# Patient Record
Sex: FEMALE | Race: WHITE | NOT HISPANIC OR LATINO | Employment: OTHER | URBAN - METROPOLITAN AREA
[De-identification: names, ages, dates, MRNs, and addresses within clinical notes are randomized per-mention and may not be internally consistent; named-entity substitution may affect disease eponyms.]

---

## 2017-08-30 DIAGNOSIS — M25.571 RIGHT ANKLE PAIN, UNSPECIFIED CHRONICITY: Primary | ICD-10-CM

## 2017-08-31 ENCOUNTER — OFFICE VISIT (OUTPATIENT)
Dept: ORTHOPEDICS | Facility: CLINIC | Age: 62
End: 2017-08-31
Payer: MEDICARE

## 2017-08-31 ENCOUNTER — HOSPITAL ENCOUNTER (OUTPATIENT)
Dept: RADIOLOGY | Facility: HOSPITAL | Age: 62
Discharge: HOME | End: 2017-08-31
Attending: ORTHOPAEDIC SURGERY
Payer: MEDICARE

## 2017-08-31 VITALS
WEIGHT: 213 LBS | HEART RATE: 60 BPM | SYSTOLIC BLOOD PRESSURE: 108 MMHG | DIASTOLIC BLOOD PRESSURE: 57 MMHG | HEIGHT: 67 IN | BODY MASS INDEX: 33.43 KG/M2

## 2017-08-31 DIAGNOSIS — M19.079 ARTHRITIS OF MIDFOOT: ICD-10-CM

## 2017-08-31 DIAGNOSIS — M19.071 OSTEOARTHRITIS OF MIDFOOT, RIGHT: Primary | ICD-10-CM

## 2017-08-31 DIAGNOSIS — M25.571 RIGHT ANKLE PAIN, UNSPECIFIED CHRONICITY: ICD-10-CM

## 2017-08-31 PROCEDURE — 73610 X-RAY EXAM OF ANKLE: CPT | Mod: 26,RT,, | Performed by: RADIOLOGY

## 2017-08-31 PROCEDURE — 73610 X-RAY EXAM OF ANKLE: CPT | Mod: TC,PN,RT

## 2017-08-31 PROCEDURE — 3008F BODY MASS INDEX DOCD: CPT | Mod: S$GLB,,, | Performed by: ORTHOPAEDIC SURGERY

## 2017-08-31 PROCEDURE — 3074F SYST BP LT 130 MM HG: CPT | Mod: S$GLB,,, | Performed by: ORTHOPAEDIC SURGERY

## 2017-08-31 PROCEDURE — 3078F DIAST BP <80 MM HG: CPT | Mod: S$GLB,,, | Performed by: ORTHOPAEDIC SURGERY

## 2017-08-31 PROCEDURE — 99204 OFFICE O/P NEW MOD 45 MIN: CPT | Mod: S$GLB,,, | Performed by: ORTHOPAEDIC SURGERY

## 2017-08-31 PROCEDURE — 99999 PR PBB SHADOW E&M-EST. PATIENT-LVL III: CPT | Mod: PBBFAC,,, | Performed by: ORTHOPAEDIC SURGERY

## 2017-08-31 NOTE — LETTER
September 5, 2017      Vel Park MD  [address] [facility] [address]  [facility] [address]           [address] [address]  Phone: [phone]          Patient: Megha Robb   MR Number: [id number]   YOB: 1955   Date of Visit: 8/31/2017       Dear Dr. Vel Park:    Thank you for referring Megha Robb to me for evaluation. Attached you will find relevant portions of my assessment and plan of care.    If you have questions, please do not hesitate to call me. I look forward to following Megha Robb along with you.    Sincerely,    Keisha Ball  CC:  No Recipients    If you would like to receive this communication electronically, please contact [email or web address] or [phone] to request more information on [employer] Link access.    For providers and/or their staff who would like to refer a patient to [facility], please contact us through our one-stop-shop provider referral line, [facility], at [phone].    If you feel you have received this communication in error or would no longer like to receive these types of communications, please e-mail [email or web address]

## 2017-09-06 PROBLEM — M19.079 ARTHRITIS OF MIDFOOT: Status: ACTIVE | Noted: 2017-09-06

## 2017-09-06 RX ORDER — CLINDAMYCIN PHOSPHATE 900 MG/50ML
900 INJECTION, SOLUTION INTRAVENOUS
Status: CANCELLED | OUTPATIENT
Start: 2017-09-06

## 2017-09-06 RX ORDER — MUPIROCIN 20 MG/G
1 OINTMENT TOPICAL
Status: CANCELLED | OUTPATIENT
Start: 2017-09-06

## 2017-09-06 NOTE — PROGRESS NOTES
"HPI: Megha Robb is a 62 y.o. female who was referred to me by Dr. Park and was seen in consultation today for right ankle and foot pain.  No history of injury or trauma. She rates her pain as 9/10 today and The pain is worse with walking and standing. She has h/o of chronic pain and is disabled for back and neck pain. She has a pain management doctor.     PAST MEDICAL/SURGICAL/FAMILY/SOCIAL/ HISTORY: REVIEWED    ALLERGIES/MEDICATIONS: REVIEWED       Review of Systems:     Constitution: Negative.   HEENT: Negative.   Eyes: Negative.   Cardiovascular: Negative.   Respiratory: Negative.   Endocrine: Negative.   Hematologic/Lymphatic: Negative.   Skin: Negative.   Musculoskeletal: Positive for right foot and ankle pain   Gastrointestinal: Negative.   Genitourinary: Negative.   Neurological: Negative.   Psychiatric/Behavioral: Negative.   Allergic/Immunologic: Negative.       PHYSICAL EXAM:  Vitals:    08/31/17 0826   BP: (!) 108/57   Pulse: 60     Ht Readings from Last 1 Encounters:   08/31/17 5' 7" (1.702 m)     Wt Readings from Last 1 Encounters:   08/31/17 96.6 kg (213 lb)         GENERAL: Well developed, well nourished, no acute distress.  SKIN: Skin is intact. No atrophy, abrasions or lesions are noted.   Neurological: Normal mental status. Appropriate and conversant. Alert and oriented x 3.  GAIT: Walks with non-antalgic gait.    Right lower extremity compared with LLE:  1+ dorsalis pedis pulse.  Capillary refill < 3 seconds.  Normal range of motion tibiotalar and subtalar joints. Normal alignment of the forefoot and the hindfoot.  5/5 strength EHL, FHL, tibialis anterior, gastrocsoleus, tibialis posterior and peroneals. Sensation to light touch intact sural, saphenous, superficial peroneal and deep peroneal nerves. Mild swelling midfoot with  tenderness to palpation and dorsal bossing. No ecchymosis or deformity. No lymphadenopathy, no masses or tumors palpated.        XRAYS:   3 views of right ankle " obtained and reviewed today reveal midfoot osteoarthritis with dorsal spurring.       ASSESSMENT:        Encounter Diagnoses   Name Primary?    Osteoarthritis of midfoot, right Yes    Arthritis of midfoot       PLAN:  I spent 20 minutes in consulation with the patient today. More than half the time was spent counseling the patient on her condition and the options for operative versus non-operative care.  She declined injection today. Plan on removal of bone spurs and if she has persistent pain we can plan on midfoot fusion in future.

## 2017-09-08 ENCOUNTER — ANESTHESIA EVENT (OUTPATIENT)
Dept: SURGERY | Facility: HOSPITAL | Age: 62
End: 2017-09-08
Payer: MEDICARE

## 2017-09-11 ENCOUNTER — SURGERY (OUTPATIENT)
Age: 62
End: 2017-09-11

## 2017-09-11 ENCOUNTER — ANESTHESIA (OUTPATIENT)
Dept: SURGERY | Facility: HOSPITAL | Age: 62
End: 2017-09-11
Payer: MEDICARE

## 2017-09-11 ENCOUNTER — HOSPITAL ENCOUNTER (OUTPATIENT)
Facility: HOSPITAL | Age: 62
Discharge: HOME | End: 2017-09-11
Attending: ORTHOPAEDIC SURGERY | Admitting: ORTHOPAEDIC SURGERY
Payer: MEDICARE

## 2017-09-11 VITALS
BODY MASS INDEX: 35.44 KG/M2 | WEIGHT: 200 LBS | SYSTOLIC BLOOD PRESSURE: 120 MMHG | HEIGHT: 63 IN | RESPIRATION RATE: 18 BRPM | HEART RATE: 60 BPM | DIASTOLIC BLOOD PRESSURE: 62 MMHG | TEMPERATURE: 98 F | OXYGEN SATURATION: 98 %

## 2017-09-11 DIAGNOSIS — M19.071 OSTEOARTHRITIS OF MIDFOOT, RIGHT: Primary | ICD-10-CM

## 2017-09-11 PROBLEM — M19.079 OSTEOARTHRITIS OF MIDFOOT: Status: ACTIVE | Noted: 2017-09-11

## 2017-09-11 PROCEDURE — 28104 REMOVAL OF FOOT LESION: CPT | Mod: RT,,, | Performed by: ORTHOPAEDIC SURGERY

## 2017-09-11 PROCEDURE — S0020 INJECTION, BUPIVICAINE HYDRO: HCPCS | Mod: PO | Performed by: ORTHOPAEDIC SURGERY

## 2017-09-11 PROCEDURE — 25000003 PHARM REV CODE 250: Mod: PO | Performed by: ANESTHESIOLOGY

## 2017-09-11 PROCEDURE — 36000706: Mod: PO | Performed by: ORTHOPAEDIC SURGERY

## 2017-09-11 PROCEDURE — 36000707: Mod: PO | Performed by: ORTHOPAEDIC SURGERY

## 2017-09-11 PROCEDURE — 63600175 PHARM REV CODE 636 W HCPCS: Mod: PO | Performed by: ANESTHESIOLOGY

## 2017-09-11 PROCEDURE — 27200651 HC AIRWAY, LMA: Mod: PO | Performed by: NURSE ANESTHETIST, CERTIFIED REGISTERED

## 2017-09-11 PROCEDURE — 25000003 PHARM REV CODE 250: Mod: PO | Performed by: ORTHOPAEDIC SURGERY

## 2017-09-11 PROCEDURE — 37000008 HC ANESTHESIA 1ST 15 MINUTES: Mod: PO | Performed by: ORTHOPAEDIC SURGERY

## 2017-09-11 PROCEDURE — D9220A PRA ANESTHESIA: Mod: ANES,,, | Performed by: ANESTHESIOLOGY

## 2017-09-11 PROCEDURE — 37000009 HC ANESTHESIA EA ADD 15 MINS: Mod: PO | Performed by: ORTHOPAEDIC SURGERY

## 2017-09-11 PROCEDURE — 71000033 HC RECOVERY, INTIAL HOUR: Mod: PO | Performed by: ORTHOPAEDIC SURGERY

## 2017-09-11 PROCEDURE — D9220A PRA ANESTHESIA: Mod: CRNA,,, | Performed by: NURSE ANESTHETIST, CERTIFIED REGISTERED

## 2017-09-11 PROCEDURE — S0077 INJECTION, CLINDAMYCIN PHOSP: HCPCS | Mod: PO | Performed by: ORTHOPAEDIC SURGERY

## 2017-09-11 PROCEDURE — 63600175 PHARM REV CODE 636 W HCPCS: Mod: PO | Performed by: NURSE ANESTHETIST, CERTIFIED REGISTERED

## 2017-09-11 RX ORDER — LIDOCAINE HCL/PF 100 MG/5ML
SYRINGE (ML) INTRAVENOUS
Status: DISCONTINUED | OUTPATIENT
Start: 2017-09-11 | End: 2017-09-11

## 2017-09-11 RX ORDER — ACETAMINOPHEN 10 MG/ML
INJECTION, SOLUTION INTRAVENOUS
Status: DISCONTINUED | OUTPATIENT
Start: 2017-09-11 | End: 2017-09-11

## 2017-09-11 RX ORDER — FENTANYL CITRATE 50 UG/ML
25 INJECTION, SOLUTION INTRAMUSCULAR; INTRAVENOUS EVERY 5 MIN PRN
Status: DISCONTINUED | OUTPATIENT
Start: 2017-09-11 | End: 2017-09-11 | Stop reason: HOSPADM

## 2017-09-11 RX ORDER — SODIUM CHLORIDE, SODIUM LACTATE, POTASSIUM CHLORIDE, CALCIUM CHLORIDE 600; 310; 30; 20 MG/100ML; MG/100ML; MG/100ML; MG/100ML
INJECTION, SOLUTION INTRAVENOUS CONTINUOUS
Status: DISCONTINUED | OUTPATIENT
Start: 2017-09-11 | End: 2017-09-11 | Stop reason: HOSPADM

## 2017-09-11 RX ORDER — BUPIVACAINE HYDROCHLORIDE 5 MG/ML
INJECTION, SOLUTION EPIDURAL; INTRACAUDAL
Status: DISCONTINUED | OUTPATIENT
Start: 2017-09-11 | End: 2017-09-11 | Stop reason: HOSPADM

## 2017-09-11 RX ORDER — OXYCODONE HYDROCHLORIDE 5 MG/1
5 TABLET ORAL
Status: DISCONTINUED | OUTPATIENT
Start: 2017-09-11 | End: 2017-09-11 | Stop reason: HOSPADM

## 2017-09-11 RX ORDER — MUPIROCIN 20 MG/G
1 OINTMENT TOPICAL
Status: COMPLETED | OUTPATIENT
Start: 2017-09-11 | End: 2017-09-11

## 2017-09-11 RX ORDER — DEXAMETHASONE SODIUM PHOSPHATE 4 MG/ML
8 INJECTION, SOLUTION INTRA-ARTICULAR; INTRALESIONAL; INTRAMUSCULAR; INTRAVENOUS; SOFT TISSUE
Status: COMPLETED | OUTPATIENT
Start: 2017-09-11 | End: 2017-09-11

## 2017-09-11 RX ORDER — MIDAZOLAM HYDROCHLORIDE 1 MG/ML
INJECTION, SOLUTION INTRAMUSCULAR; INTRAVENOUS
Status: DISCONTINUED | OUTPATIENT
Start: 2017-09-11 | End: 2017-09-11

## 2017-09-11 RX ORDER — FENTANYL CITRATE 50 UG/ML
INJECTION, SOLUTION INTRAMUSCULAR; INTRAVENOUS
Status: DISCONTINUED | OUTPATIENT
Start: 2017-09-11 | End: 2017-09-11

## 2017-09-11 RX ORDER — LIDOCAINE HYDROCHLORIDE 10 MG/ML
1 INJECTION, SOLUTION EPIDURAL; INFILTRATION; INTRACAUDAL; PERINEURAL ONCE
Status: DISCONTINUED | OUTPATIENT
Start: 2017-09-11 | End: 2017-09-11 | Stop reason: HOSPADM

## 2017-09-11 RX ORDER — SODIUM CHLORIDE 0.9 % (FLUSH) 0.9 %
3 SYRINGE (ML) INJECTION
Status: DISCONTINUED | OUTPATIENT
Start: 2017-09-11 | End: 2017-09-11 | Stop reason: HOSPADM

## 2017-09-11 RX ORDER — PROMETHAZINE HYDROCHLORIDE 25 MG/1
25 TABLET ORAL EVERY 6 HOURS PRN
Qty: 20 TABLET | Refills: 1 | Status: SHIPPED | OUTPATIENT
Start: 2017-09-11 | End: 2017-09-21 | Stop reason: SDUPTHER

## 2017-09-11 RX ORDER — HYDROCODONE BITARTRATE AND ACETAMINOPHEN 5; 325 MG/1; MG/1
1 TABLET ORAL EVERY 4 HOURS PRN
Status: DISCONTINUED | OUTPATIENT
Start: 2017-09-11 | End: 2017-09-11 | Stop reason: HOSPADM

## 2017-09-11 RX ORDER — ONDANSETRON 2 MG/ML
INJECTION INTRAMUSCULAR; INTRAVENOUS
Status: DISCONTINUED | OUTPATIENT
Start: 2017-09-11 | End: 2017-09-11

## 2017-09-11 RX ORDER — OXYCODONE AND ACETAMINOPHEN 5; 325 MG/1; MG/1
1 TABLET ORAL EVERY 6 HOURS PRN
Qty: 42 TABLET | Refills: 0 | Status: SHIPPED | OUTPATIENT
Start: 2017-09-11 | End: 2017-09-21 | Stop reason: SDUPTHER

## 2017-09-11 RX ORDER — LIDOCAINE HYDROCHLORIDE 20 MG/ML
INJECTION, SOLUTION EPIDURAL; INFILTRATION; INTRACAUDAL; PERINEURAL
Status: DISCONTINUED | OUTPATIENT
Start: 2017-09-11 | End: 2017-09-11 | Stop reason: HOSPADM

## 2017-09-11 RX ORDER — PROPOFOL 10 MG/ML
VIAL (ML) INTRAVENOUS
Status: DISCONTINUED | OUTPATIENT
Start: 2017-09-11 | End: 2017-09-11

## 2017-09-11 RX ORDER — MUPIROCIN 20 MG/G
OINTMENT TOPICAL
Status: DISCONTINUED | OUTPATIENT
Start: 2017-09-11 | End: 2017-09-11 | Stop reason: HOSPADM

## 2017-09-11 RX ADMIN — MUPIROCIN 1 TUBE: 20 OINTMENT TOPICAL at 07:09

## 2017-09-11 RX ADMIN — BUPIVACAINE HYDROCHLORIDE 30 ML: 5 INJECTION, SOLUTION EPIDURAL; INTRACAUDAL; PERINEURAL at 07:09

## 2017-09-11 RX ADMIN — LIDOCAINE HYDROCHLORIDE 100 MG: 20 INJECTION PARENTERAL at 07:09

## 2017-09-11 RX ADMIN — MIDAZOLAM HYDROCHLORIDE 2 MG: 1 INJECTION, SOLUTION INTRAMUSCULAR; INTRAVENOUS at 07:09

## 2017-09-11 RX ADMIN — FENTANYL CITRATE 25 MCG: 50 INJECTION, SOLUTION INTRAMUSCULAR; INTRAVENOUS at 07:09

## 2017-09-11 RX ADMIN — LIDOCAINE HYDROCHLORIDE 20 ML: 20 INJECTION, SOLUTION EPIDURAL; INFILTRATION; INTRACAUDAL; PERINEURAL at 07:09

## 2017-09-11 RX ADMIN — DEXAMETHASONE SODIUM PHOSPHATE 8 MG: 4 INJECTION, SOLUTION INTRAMUSCULAR; INTRAVENOUS at 06:09

## 2017-09-11 RX ADMIN — PROPOFOL 150 MG: 10 INJECTION, EMULSION INTRAVENOUS at 07:09

## 2017-09-11 RX ADMIN — SODIUM CHLORIDE, SODIUM LACTATE, POTASSIUM CHLORIDE, AND CALCIUM CHLORIDE: .6; .31; .03; .02 INJECTION, SOLUTION INTRAVENOUS at 06:09

## 2017-09-11 RX ADMIN — DEXTROSE 900 MG: 50 INJECTION, SOLUTION INTRAVENOUS at 07:09

## 2017-09-11 RX ADMIN — MUPIROCIN 1 G: 20 OINTMENT TOPICAL at 07:09

## 2017-09-11 RX ADMIN — ACETAMINOPHEN 1000 MG: 10 INJECTION, SOLUTION INTRAVENOUS at 07:09

## 2017-09-11 RX ADMIN — ONDANSETRON 4 MG: 2 INJECTION, SOLUTION INTRAMUSCULAR; INTRAVENOUS at 07:09

## 2017-09-11 RX ADMIN — OXYCODONE HYDROCHLORIDE 5 MG: 5 TABLET ORAL at 08:09

## 2017-09-11 NOTE — ANESTHESIA PREPROCEDURE EVALUATION
09/11/2017  Megha Robb is a 62 y.o., female.    Anesthesia Evaluation    I have reviewed the Patient Summary Reports.    I have reviewed the Nursing Notes.   I have reviewed the Medications.     Review of Systems  Anesthesia Hx:  No problems with previous Anesthesia    Social:  Alcohol Use    Cardiovascular:   Hypertension Denies CAD.     Denies Angina.    Musculoskeletal:   Arthritis   Spine Disorders:    Neurological:   Chronic Pain Syndrome  Peripheral Neuropathy    Psych:   anxiety depression          Physical Exam  General:  Obesity    Airway/Jaw/Neck:  Airway Findings: Mouth Opening: Small, but > 3cm Mallampati: III  Improves to III with phonation.  TM Distance: 4 - 6 cm  Jaw/Neck Findings:  Neck ROM: Extension Decreased, Mild      Dental:  Dental Findings: Edentulous   Chest/Lungs:  Chest/Lungs Findings: Clear to auscultation, Normal Respiratory Rate     Heart/Vascular:  Heart Findings: Rate: Normal  Rhythm: Regular Rhythm        Mental Status:  Mental Status Findings:  Cooperative, Alert and Oriented         Anesthesia Plan  Type of Anesthesia, risks & benefits discussed:  Anesthesia Type:  general  Patient's Preference:   Intra-op Monitoring Plan: standard ASA monitors  Intra-op Monitoring Plan Comments:   Post Op Pain Control Plan:   Post Op Pain Control Plan Comments:   Induction:   IV  Beta Blocker:  Patient is not currently on a Beta-Blocker (No further documentation required).       Informed Consent: Patient understands risks and agrees with Anesthesia plan.  Questions answered. Anesthesia consent signed with patient.  ASA Score: 3     Day of Surgery Review of History & Physical: I have interviewed and examined the patient. I have reviewed the patient's H&P dated:  There are no significant changes.      Anesthesia Plan Notes: gen c lma        Ready For Surgery From Anesthesia Perspective.

## 2017-09-11 NOTE — ANESTHESIA POSTPROCEDURE EVALUATION
"Anesthesia Post Evaluation    Patient: Megha Robb    Procedure(s) Performed: Procedure(s) (LRB):  EXCISION-BONE SPUR-FOOT (Right)    Final Anesthesia Type: general  Patient location during evaluation: PACU  Patient participation: Yes- Able to Participate  Level of consciousness: awake and alert and oriented  Post-procedure vital signs: reviewed and stable  Pain management: adequate  Airway patency: patent  PONV status at discharge: No PONV  Anesthetic complications: no      Cardiovascular status: hemodynamically stable  Respiratory status: unassisted, spontaneous ventilation and room air  Hydration status: euvolemic  Follow-up not needed.        Visit Vitals  /62 (BP Location: Left arm, Patient Position: Lying)   Pulse 60   Temp 36.6 °C (97.8 °F) (Skin)   Resp 18   Ht 5' 3" (1.6 m)   Wt 90.7 kg (200 lb)   SpO2 98%   Breastfeeding? No   BMI 35.43 kg/m²       Pain/Juan Score: Pain Assessment Performed: Yes (9/11/2017  8:20 AM)  Presence of Pain: complains of pain/discomfort (9/11/2017  8:20 AM)  Pain Rating Prior to Med Admin: 5 (9/11/2017  8:10 AM)  Pain Rating Post Med Admin: 3 (9/11/2017  8:20 AM)  Juan Score: 10 (9/11/2017  8:20 AM)      "

## 2017-09-11 NOTE — DISCHARGE INSTRUCTIONS
Discharge Instructions: After Your Surgery  Youve just had surgery. During surgery, you were given medicine called anesthesia to keep you relaxed and free of pain. After surgery, you may have some pain or nausea. This is common. Here are some tips for feeling better and getting well after surgery.     Stay on schedule with your medicine.   Going home  Your healthcare provider will show you how to take care of yourself when you go home. He or she will also answer your questions. Have an adult family member or friend drive you home. For the first 24 hours after your surgery:  · Do not drive or use heavy equipment.  · Do not make important decisions or sign legal papers.  · Do not drink alcohol.  · Have someone stay with you, if needed. He or she can watch for problems and help keep you safe.  Be sure to go to all follow-up visits with your healthcare provider. And rest after your surgery for as long as your healthcare provider tells you to.  Coping with pain  If you have pain after surgery, pain medicine will help you feel better. Take it as told, before pain becomes severe. Also, ask your healthcare provider or pharmacist about other ways to control pain. This might be with heat, ice, or relaxation. And follow any other instructions your surgeon or nurse gives you.  Tips for taking pain medicine  To get the best relief possible, remember these points:  · Pain medicines can upset your stomach. Taking them with a little food may help.  · Most pain relievers taken by mouth need at least 20 to 30 minutes to start to work.  · Taking medicine on a schedule can help you remember to take it. Try to time your medicine so that you can take it before starting an activity. This might be before you get dressed, go for a walk, or sit down for dinner.  · Constipation is a common side effect of pain medicines. Call your healthcare provider before taking any medicines such as laxatives or stool softeners to help ease constipation.  Also ask if you should skip any foods. Drinking lots of fluids and eating foods such as fruits and vegetables that are high in fiber can also help. Remember, do not take laxatives unless your surgeon has prescribed them.  · Drinking alcohol and taking pain medicine can cause dizziness and slow your breathing. It can even be deadly. Do not drink alcohol while taking pain medicine.  · Pain medicine can make you react more slowly to things. Do not drive or run machinery while taking pain medicine.  Your healthcare provider may tell you to take acetaminophen to help ease your pain. Ask him or her how much you are supposed to take each day. Acetaminophen or other pain relievers may interact with your prescription medicines or other over-the-counter (OTC) medicines. Some prescription medicines have acetaminophen and other ingredients. Using both prescription and OTC acetaminophen for pain can cause you to overdose. Read the labels on your OTC medicines with care. This will help you to clearly know the list of ingredients, how much to take, and any warnings. It may also help you not take too much acetaminophen. If you have questions or do not understand the information, ask your pharmacist or healthcare provider to explain it to you before you take the OTC medicine.  Managing nausea  Some people have an upset stomach after surgery. This is often because of anesthesia, pain, or pain medicine, or the stress of surgery. These tips will help you handle nausea and eat healthy foods as you get better. If you were on a special food plan before surgery, ask your healthcare provider if you should follow it while you get better. These tips may help:  · Do not push yourself to eat. Your body will tell you when to eat and how much.  · Start off with clear liquids and soup. They are easier to digest.  · Next try semi-solid foods, such as mashed potatoes, applesauce, and gelatin, as you feel ready.  · Slowly move to solid foods. Dont  eat fatty, rich, or spicy foods at first.  · Do not force yourself to have 3 large meals a day. Instead eat smaller amounts more often.  · Take pain medicines with a small amount of solid food, such as crackers or toast, to avoid nausea.     Call your surgeon if  · You still have pain an hour after taking medicine. The medicine may not be strong enough.  · You feel too sleepy, dizzy, or groggy. The medicine may be too strong.  · You have side effects like nausea, vomiting, or skin changes, such as rash, itching, or hives.       If you have obstructive sleep apnea  You were given anesthesia medicine during surgery to keep you comfortable and free of pain. After surgery, you may have more apnea spells because of this medicine and other medicines you were given. The spells may last longer than usual.   At home:  · Keep using the continuous positive airway pressure (CPAP) device when you sleep. Unless your healthcare provider tells you not to, use it when you sleep, day or night. CPAP is a common device used to treat obstructive sleep apnea.  · Talk with your provider before taking any pain medicine, muscle relaxants, or sedatives. Your provider will tell you about the possible dangers of taking these medicines.  Date Last Reviewed: 12/1/2016 © 2000-2017 [employer]. [address] [place] [address]. All rights reserved. This information is not intended as a substitute for professional medical care. Always follow your healthcare professional's instructions.        FOOT SURGERY  After surgery:    DOS:   Keep leg elevated until first post operative visit   Keep dressing clean and dry DO NOT CHANGE BANDAGES   Advanced diet as tolerated.    Check circulation frequently in toes by pressing down on toenail. Nail should turn white and then pink when released.   May walk on foot to go to the bathroom only DO NOT WALK WITHOUT BOOT   Wear surgical boot. May remove boot to sleep.   Resume home  medications    DONT:   Do not remove your dressing   Do not get dressing wet.   No driving until released by MD   DO NOT TAKE ADDITIONAL TYLENOL/ACETAMINOPHEN WHILE TAKING NARCOTIC PAIN MEDICATION THAT CONTAINS TYLENOL/ACETAMINOPHEN.    CALL PHYSICIAN FOR:   Pain, burning, or numbness of the toes not relieved by elevation of the leg.   Pale or cold toes; bluish nail beds.   Redness, swelling, or bleeding.   Fever> 101   Drainage (pus) from the puncture sites   Pain unrelieved by pain medication    FOR EMERGENCIES CONTACT YOUR PHYSICIAN [phone]

## 2017-09-11 NOTE — OP NOTE
DATE:9/11/2017 TIME: 7:57 AM     PATIENT NAME: Megha Robb     PRE-OPERATIVE DIAGNOSIS: Right midfoot osteoarthritis     POST-OPERATIVE DIAGNOSIS: Right midfoot osteoarthritis     PROCEDURE: Removal of bone spurs right midfoot    SURGEON: Nash Davis MD    ANESTHESIA TYPE: LMA    SPECIMENS: None     COMPLICATIONS: None     BLOOD LOSS: < 5 cc     ASSISTANT: HERIBERTO Terrell     PROCEDURE IN DETAIL: After appropriate informed consent was obtained the patient was taken to the OR and placed in the supine position on the operating table. The Right lower extremity was prepped and draped in the usual sterile fashion. A 3.5 cm incision was made overlying the midline of the dorsal midfoot using a #15 blade. The neurovascular bundle was directly adherent to the area of spurring. This was identified, retracted gently and protected throughout the case. The bone spurs at the  tarsometatarsal joints were removed using sagittal saw, rongeur, and rasp. The incision was irrigated with normal saline. Tourniquet was let done adequate hemostasis was achieved using bovie cautery. The subcutaneous layer was re-approximated using 3.0 monocryl interrupted sutures. The skin was re-approximated using 3.0 nylon interrupted sutures. Sterile dressing was using xeroform, bacitracin, 4x 8s cast padding and ace wrap. Local anesthesia was administered using 20 cc of a 1:1 mixture of 1% lidocaine plain and 0.25% Marcaine plain.   The patient tolerated the procedure well without complications. I was present and scrubbed for the entire case.

## 2017-09-11 NOTE — DISCHARGE SUMMARY
[facility]  Discharge Note  Short Stay    Admit Date: 9/11/2017    Discharge Date and Time: 9/11/2017 8:03 AM     Attending Physician: Nash Davis MD     Discharge Provider: Nash Davis    Diagnoses:  Active Hospital Problems    Diagnosis  POA    *Osteoarthritis of midfoot [M19.079]  Yes      Resolved Hospital Problems    Diagnosis Date Resolved POA   No resolved problems to display.       Discharged Condition: good    Hospital Course: Patient was admitted for an outpatient procedure and tolerated the procedure well with no complications.    Final Diagnoses: Same as principal problem.    Disposition: Home or Self Care    Follow up/Patient Instructions:    Medications:  Reconciled Home Medications:   Current Discharge Medication List      START taking these medications    Details   oxycodone-acetaminophen (PERCOCET) 5-325 mg per tablet Take 1 tablet by mouth every 6 (six) hours as needed for Pain.  Qty: 42 tablet, Refills: 0      promethazine (PHENERGAN) 25 MG tablet Take 1 tablet (25 mg total) by mouth every 6 (six) hours as needed for Nausea.  Qty: 20 tablet, Refills: 1         CONTINUE these medications which have NOT CHANGED    Details   !! alprazolam (XANAX) 0.25 MG tablet Take 0.25 mg by mouth 2 (two) times daily as needed. pateint unsure of dosage      !! alprazolam (XANAX) 2 MG Tab Refills: 1      carisoprodol (SOMA) 350 MG tablet Take 350 mg by mouth 4 (four) times daily as needed. Patient unsure of mg or dosage       citalopram (CELEXA) 20 MG tablet Take 20 mg by mouth once daily.      ferrous sulfate 325 (65 FE) MG EC tablet Take 325 mg by mouth once daily.       hydrocodone-acetaminophen (LORCET)  mg per tablet Take 1 tablet by mouth every 6 (six) hours as needed.      hydrocodone-acetaminophen 10-325mg (NORCO)  mg Tab Take 1 tablet by mouth every 4 (four) hours.  Refills: 0      multivitamin (MULTIVITAMIN) per tablet Take 1 tablet by mouth once daily.        pregabalin  (LYRICA) 50 MG capsule Take 50 mg by mouth 3 (three) times daily.      quinapril (ACCUPRIL) 5 MG tablet Take 40 mg by mouth every evening. Patient unsure of dosage      tizanidine (ZANAFLEX) 4 MG tablet TAKE 1 TABLET BY MOUTH EVERY NIGHT AT BEDTIME AS NEEDED  Qty: 90 tablet, Refills: 0    Comments: **Patient requests 90 days supply**      diclofenac (VOLTAREN) 50 MG EC tablet       estrogens, conjugated, (PREMARIN) 1.25 MG tablet Take 1.25 mg by mouth once daily. Patient unsure of dosage       FLUVIRIN [id number] 45 mcg (15 mcg x 3)/0.5 mL Susp Refills: 0      hydrOXYzine pamoate (VISTARIL) 50 MG Cap Refills: 1       !! - Potential duplicate medications found. Please discuss with provider.      STOP taking these medications       meloxicam (MOBIC) 15 MG tablet Comments:   Reason for Stopping:         ibuprofen (ADVIL,MOTRIN) 600 MG tablet Comments:   Reason for Stopping:               Discharge Procedure Orders  Diet general     Call MD for:  temperature >100.4     Call MD for:  persistent nausea and vomiting     Call MD for:  severe uncontrolled pain     Call MD for:  difficulty breathing, headache or visual disturbances     Call MD for:  redness, tenderness, or signs of infection (pain, swelling, redness, odor or green/yellow discharge around incision site)     Call MD for:  hives     Call MD for:  persistent dizziness or light-headedness     Call MD for:  extreme fatigue     Keep surgical extremity elevated     Weight bearing restrictions (specify)   Order Comments: Weight bearing as tolerated in cam boot     No driving, operating heavy equipment or signing legal documents while taking pain medication     Leave dressing on - Keep it clean, dry, and intact until clinic visit       Follow-up Information     Nash Davis MD On 9/21/2017.    Specialty:  Orthopedic Surgery  Contact information:  [address] [facility] [address] [place] [address] [phone]                   Discharge Procedure Orders (must include Diet,  Follow-up, Activity):    Discharge Procedure Orders (must include Diet, Follow-up, Activity)  Diet general     Call MD for:  temperature >100.4     Call MD for:  persistent nausea and vomiting     Call MD for:  severe uncontrolled pain     Call MD for:  difficulty breathing, headache or visual disturbances     Call MD for:  redness, tenderness, or signs of infection (pain, swelling, redness, odor or green/yellow discharge around incision site)     Call MD for:  hives     Call MD for:  persistent dizziness or light-headedness     Call MD for:  extreme fatigue     Keep surgical extremity elevated     Weight bearing restrictions (specify)   Order Comments: Weight bearing as tolerated in cam boot     No driving, operating heavy equipment or signing legal documents while taking pain medication     Leave dressing on - Keep it clean, dry, and intact until clinic visit

## 2017-09-11 NOTE — H&P
"HPI: Megha Robb is a 62 y.o. female who was referred to me by Dr. Park and was seen in consultation today for right ankle and foot pain.  No history of injury or trauma. She rates her pain as 9/10 today and The pain is worse with walking and standing. She has h/o of chronic pain and is disabled for back and neck pain. She has a pain management doctor.      PAST MEDICAL/SURGICAL/FAMILY/SOCIAL/ HISTORY: REVIEWED    ALLERGIES/MEDICATIONS: REVIEWED         Review of Systems:     Constitution: Negative.   HEENT: Negative.   Eyes: Negative.   Cardiovascular: Negative.   Respiratory: Negative.   Endocrine: Negative.   Hematologic/Lymphatic: Negative.   Skin: Negative.   Musculoskeletal: Positive for right foot and ankle pain   Gastrointestinal: Negative.   Genitourinary: Negative.   Neurological: Negative.   Psychiatric/Behavioral: Negative.   Allergic/Immunologic: Negative.         PHYSICAL EXAM:      Vitals:     08/31/17 0826   BP: (!) 108/57   Pulse: 60          Ht Readings from Last 1 Encounters:   08/31/17 5' 7" (1.702 m)          Wt Readings from Last 1 Encounters:   08/31/17 96.6 kg (213 lb)            GENERAL: Well developed, well nourished, no acute distress.  SKIN: Skin is intact. No atrophy, abrasions or lesions are noted.   Neurological: Normal mental status. Appropriate and conversant. Alert and oriented x 3.  GAIT: Walks with non-antalgic gait.     Right lower extremity compared with LLE:  1+ dorsalis pedis pulse.  Capillary refill < 3 seconds.  Normal range of motion tibiotalar and subtalar joints. Normal alignment of the forefoot and the hindfoot.  5/5 strength EHL, FHL, tibialis anterior, gastrocsoleus, tibialis posterior and peroneals. Sensation to light touch intact sural, saphenous, superficial peroneal and deep peroneal nerves. Mild swelling midfoot with  tenderness to palpation and dorsal bossing. No ecchymosis or deformity. No lymphadenopathy, no masses or tumors palpated.          XRAYS:   3 " views of right ankle obtained and reviewed today reveal midfoot osteoarthritis with dorsal spurring.         ASSESSMENT:              Encounter Diagnoses   Name Primary?    Osteoarthritis of midfoot, right Yes    Arthritis of midfoot        PLAN:  I spent 20 minutes in consulation with the patient today. More than half the time was spent counseling the patient on her condition and the options for operative versus non-operative care.  She declined injection today. Plan on removal of bone spurs and if she has persistent pain we can plan on midfoot fusion in future.

## 2017-09-11 NOTE — TRANSFER OF CARE
"Anesthesia Transfer of Care Note    Patient: Megha Robb    Procedure(s) Performed: Procedure(s) (LRB):  EXCISION-BONE SPUR-FOOT (Right)    Patient location: PACU    Anesthesia Type: general    Transport from OR: Transported from OR on room air with adequate spontaneous ventilation    Post pain: adequate analgesia    Post assessment: no apparent anesthetic complications and tolerated procedure well    Post vital signs: stable    Level of consciousness: awake and alert    Nausea/Vomiting: no nausea/vomiting    Complications: none    Transfer of care protocol was followed      Last vitals:   Visit Vitals  BP (!) 106/51 (BP Location: Right arm, Patient Position: Sitting)   Pulse 61   Temp 36.6 °C (97.9 °F) (Skin)   Resp 18   Ht 5' 3" (1.6 m)   Wt 90.7 kg (200 lb)   SpO2 99%   Breastfeeding? No   BMI 35.43 kg/m²     "

## 2017-09-11 NOTE — BRIEF OP NOTE
DATE:9/11/2017 TIME: 7:57 AM     PATIENT NAME: Megha Robb     PRE-OPERATIVE DIAGNOSIS: Right midfoot osteoarthritis     POST-OPERATIVE DIAGNOSIS: Right midfoot osteoarthritis     PROCEDURE: Removal of bone spurs right midfoot    SURGEON: Nash Davis MD    ANESTHESIA TYPE: LMA    SPECIMENS: None     COMPLICATIONS: None     BLOOD LOSS: < 5 cc     ASSISTANT: HERIBERTO Terrell

## 2017-09-15 ENCOUNTER — TELEPHONE (OUTPATIENT)
Dept: ORTHOPEDICS | Facility: CLINIC | Age: 62
End: 2017-09-15

## 2017-09-15 NOTE — TELEPHONE ENCOUNTER
----- Message from Lorrie Rubio sent at 9/15/2017  1:39 PM CDT -----  Contact: Self  Patient left a voice mail message today at 12:45 needing to speak with the nurse.  Please call.

## 2017-09-20 DIAGNOSIS — M19.071 OSTEOARTHRITIS OF MIDFOOT, RIGHT: Primary | ICD-10-CM

## 2017-09-21 ENCOUNTER — OFFICE VISIT (OUTPATIENT)
Dept: ORTHOPEDICS | Facility: CLINIC | Age: 62
End: 2017-09-21
Payer: MEDICARE

## 2017-09-21 ENCOUNTER — HOSPITAL ENCOUNTER (OUTPATIENT)
Dept: RADIOLOGY | Facility: HOSPITAL | Age: 62
Discharge: HOME | End: 2017-09-21
Attending: ORTHOPAEDIC SURGERY
Payer: MEDICARE

## 2017-09-21 ENCOUNTER — TELEPHONE (OUTPATIENT)
Dept: ORTHOPEDICS | Facility: CLINIC | Age: 62
End: 2017-09-21

## 2017-09-21 VITALS
BODY MASS INDEX: 35.44 KG/M2 | DIASTOLIC BLOOD PRESSURE: 55 MMHG | HEIGHT: 63 IN | SYSTOLIC BLOOD PRESSURE: 121 MMHG | WEIGHT: 200 LBS | HEART RATE: 67 BPM

## 2017-09-21 DIAGNOSIS — M19.071 OSTEOARTHRITIS OF MIDFOOT, RIGHT: ICD-10-CM

## 2017-09-21 DIAGNOSIS — M19.071 OSTEOARTHRITIS OF MIDFOOT, RIGHT: Primary | ICD-10-CM

## 2017-09-21 PROBLEM — M19.079 ARTHRITIS OF MIDFOOT: Status: RESOLVED | Noted: 2017-09-06 | Resolved: 2017-09-21

## 2017-09-21 PROCEDURE — 73630 X-RAY EXAM OF FOOT: CPT | Mod: TC,PN,RT

## 2017-09-21 PROCEDURE — 73630 X-RAY EXAM OF FOOT: CPT | Mod: 26,RT,, | Performed by: RADIOLOGY

## 2017-09-21 PROCEDURE — 99024 POSTOP FOLLOW-UP VISIT: CPT | Mod: S$GLB,,, | Performed by: ORTHOPAEDIC SURGERY

## 2017-09-21 PROCEDURE — 99999 PR PBB SHADOW E&M-EST. PATIENT-LVL III: CPT | Mod: PBBFAC,,, | Performed by: ORTHOPAEDIC SURGERY

## 2017-09-21 RX ORDER — CLINDAMYCIN HYDROCHLORIDE 300 MG/1
300 CAPSULE ORAL EVERY 8 HOURS
Qty: 15 CAPSULE | Refills: 0 | Status: SHIPPED | OUTPATIENT
Start: 2017-09-21 | End: 2017-09-26

## 2017-09-21 RX ORDER — PROMETHAZINE HYDROCHLORIDE 25 MG/1
25 TABLET ORAL EVERY 6 HOURS PRN
Qty: 20 TABLET | Refills: 0 | Status: SHIPPED | OUTPATIENT
Start: 2017-09-21 | End: 2024-03-25

## 2017-09-21 RX ORDER — OXYCODONE AND ACETAMINOPHEN 5; 325 MG/1; MG/1
1 TABLET ORAL EVERY 6 HOURS PRN
Qty: 42 TABLET | Refills: 0 | Status: SHIPPED | OUTPATIENT
Start: 2017-09-21 | End: 2024-03-25

## 2017-09-21 NOTE — TELEPHONE ENCOUNTER
Spoke to Agusto with [facility]. Patient has received 180 tabs of Percocet, 100 tabs of Soma, 100 tabs of Xanax since 9/12/17. Advised Agusto do not fill the pain medication or the phenergan per Dr. Davis.

## 2017-09-21 NOTE — TELEPHONE ENCOUNTER
----- Message from Radha Solomongayathri sent at 9/21/2017  8:27 AM CDT -----  Contact: Agusto with [facility] at [phone]  Agusto with [facility] at [phone], calling about Rx oxycodone-acetaminophen (PERCOCET) 5-325 mg per tablet, they will not be able to fill this Rx. Can you send to an alternate Pharmacy. Patient has not been notified.  Please advisereagan.

## 2017-09-28 ENCOUNTER — OFFICE VISIT (OUTPATIENT)
Dept: ORTHOPEDICS | Facility: CLINIC | Age: 62
End: 2017-09-28
Payer: MEDICARE

## 2017-09-28 VITALS
BODY MASS INDEX: 38.03 KG/M2 | DIASTOLIC BLOOD PRESSURE: 75 MMHG | HEIGHT: 63 IN | HEART RATE: 59 BPM | SYSTOLIC BLOOD PRESSURE: 131 MMHG | WEIGHT: 214.63 LBS

## 2017-09-28 DIAGNOSIS — M19.071 OSTEOARTHRITIS OF MIDFOOT, RIGHT: Primary | ICD-10-CM

## 2017-09-28 PROCEDURE — 99024 POSTOP FOLLOW-UP VISIT: CPT | Mod: S$GLB,,, | Performed by: ORTHOPAEDIC SURGERY

## 2017-09-28 PROCEDURE — 99999 PR PBB SHADOW E&M-EST. PATIENT-LVL III: CPT | Mod: PBBFAC,,, | Performed by: ORTHOPAEDIC SURGERY

## 2018-12-10 NOTE — PROGRESS NOTES
Subjective:      Patient ID: Megha Robb is a 62 y.o. female.    Chief Complaint: Post-op Evaluation of the Right Foot and Post-op Evaluation (s/p bone spur excision 9/11/17)    Doing fairly well today s/p removal of bone spur right dorsal midfoot. She rates her pain as 6/10 today. She is requesting pain medication today.   Social History     Occupational History    Not on file.     Social History Main Topics    Smoking status: Never Smoker    Smokeless tobacco: Never Used    Alcohol use 1.2 oz/week     1 Shots of liquor, 1 Cans of beer per week      Comment: weekends    Drug use: No    Sexual activity: Not on file            Objective:    Ortho Exam     RLE: Neurovascularly intact, incisions well healed, moderate swelling, sutures are intact.  She has some redness and warmth around the incision, no drainage, mild  tenderness to palpation.     Assessment:     s/p removal of bone spur right dorsal midfoot.     Medications Ordered This Encounter      clindamycin (CLEOCIN) 300 MG capsule      oxycodone-acetaminophen (PERCOCET) 5-325 mg per tablet      promethazine (PHENERGAN) 25 MG tablet    Encounter Diagnosis   Name Primary?    Osteoarthritis of midfoot, right Yes       Plan:       I started her on antibiotics today. Weight bearing as tolerated.  F/u 1 week, no xray.          Leukocytosis

## 2019-07-20 DIAGNOSIS — Z12.39 SCREENING BREAST EXAMINATION: Primary | ICD-10-CM

## 2019-08-16 ENCOUNTER — HOSPITAL ENCOUNTER (OUTPATIENT)
Dept: RADIOLOGY | Facility: HOSPITAL | Age: 64
Discharge: HOME | End: 2019-08-16
Attending: INTERNAL MEDICINE
Payer: MEDICARE

## 2019-08-16 DIAGNOSIS — Z12.39 SCREENING BREAST EXAMINATION: ICD-10-CM

## 2019-08-16 PROCEDURE — 77067 SCR MAMMO BI INCL CAD: CPT | Mod: TC

## 2019-08-22 ENCOUNTER — HOSPITAL ENCOUNTER (EMERGENCY)
Facility: HOSPITAL | Age: 64
Discharge: HOME | End: 2019-08-22
Attending: EMERGENCY MEDICINE
Payer: MEDICARE

## 2019-08-22 VITALS
RESPIRATION RATE: 18 BRPM | BODY MASS INDEX: 36.8 KG/M2 | TEMPERATURE: 98 F | HEIGHT: 62 IN | WEIGHT: 200 LBS | OXYGEN SATURATION: 99 % | SYSTOLIC BLOOD PRESSURE: 99 MMHG | HEART RATE: 66 BPM | DIASTOLIC BLOOD PRESSURE: 61 MMHG

## 2019-08-22 DIAGNOSIS — M79.672 LEFT FOOT PAIN: Primary | ICD-10-CM

## 2019-08-22 PROCEDURE — 63600175 PHARM REV CODE 636 W HCPCS: Performed by: EMERGENCY MEDICINE

## 2019-08-22 PROCEDURE — 99283 EMERGENCY DEPT VISIT LOW MDM: CPT | Mod: 25

## 2019-08-22 PROCEDURE — 90471 IMMUNIZATION ADMIN: CPT | Performed by: EMERGENCY MEDICINE

## 2019-08-22 PROCEDURE — 99284 EMERGENCY DEPT VISIT MOD MDM: CPT | Mod: 25

## 2019-08-22 PROCEDURE — 90714 TD VACC NO PRESV 7 YRS+ IM: CPT | Performed by: EMERGENCY MEDICINE

## 2019-08-22 RX ORDER — PREDNISONE 20 MG/1
40 TABLET ORAL DAILY
Qty: 6 TABLET | Refills: 0 | Status: SHIPPED | OUTPATIENT
Start: 2019-08-22 | End: 2019-08-25

## 2019-08-22 RX ADMIN — TETANUS AND DIPHTHERIA TOXOIDS ADSORBED 0.5 ML: 2; 2 INJECTION INTRAMUSCULAR at 05:08

## 2019-08-22 NOTE — ED NOTES
Reviewed all discharge instructions with patient including, new medications, the need to make follow-up appointments and signs/symptoms to report to PCP or seek emergency medical care. Patient verbalized understanding to all instructions and education.

## 2019-08-22 NOTE — ED PROVIDER NOTES
Encounter Date: 8/22/2019       History     Chief Complaint   Patient presents with    Foot Pain     redness to left foot     Patient here with reported left foot pain, erythema onset gradually patient reports chronic lower extremity numbness that she is uncertain of injury to that area simply noticed the redness and discomfort she denies any fever chills she has had problems with arthritis in the other foot but has never had difficulty with this foot other than the numbness    The history is provided by the patient.     Review of patient's allergies indicates:   Allergen Reactions    Bee sting [allergen ext-venom-honey bee] Shortness Of Breath    Pcn [penicillins] Hives    Vancomycin analogues Rash     Past Medical History:   Diagnosis Date    Anemia     Cancer     Hypertension     Osteoarthritis of midfoot 9/11/2017     Past Surgical History:   Procedure Laterality Date    breast reduction      EXCISION-BONE SPUR-FOOT Right 9/11/2017    Performed by [facility]    EYE SURGERY Bilateral     PHACO    GALLBLADDER SURGERY      GASTRIC RESTRICTION SURGERY      HYSTERECTOMY      JOINT REPLACEMENT      left knee replacement      april 2017    LIPOSUCTION      to legs x3    TONSILLECTOMY      tummy tuck      VARICOSE VEIN SURGERY      WRIST ARTHROPLASTY       Family History   Problem Relation Age of Onset    Heart attack Mother     Heart attack Father      Social History     Tobacco Use    Smoking status: Never Smoker    Smokeless tobacco: Never Used   Substance Use Topics    Alcohol use: Yes     Alcohol/week: 1.2 oz     Types: 1 Shots of liquor, 1 Cans of beer per week     Comment: weekends    Drug use: No     Review of Systems   Constitutional: Negative for chills, diaphoresis and fever.   HENT: Negative.    Cardiovascular: Negative for leg swelling.   Genitourinary: Negative.    Musculoskeletal: Positive for arthralgias and joint swelling.   Skin: Positive for rash.    Neurological: Positive for numbness. Negative for weakness.       Physical Exam     Initial Vitals [08/22/19 1624]   BP Pulse Resp Temp SpO2   116/66 89 18 98.5 °F (36.9 °C) 98 %      MAP       --         Physical Exam    Constitutional: She appears well-developed and well-nourished. No distress.   HENT:   Head: Normocephalic and atraumatic.   Eyes: EOM are normal. Pupils are equal, round, and reactive to light.   Cardiovascular: Normal rate, regular rhythm, normal heart sounds and intact distal pulses.   Pulmonary/Chest: Breath sounds normal. No respiratory distress.   Musculoskeletal:   Erythema tenderness and mild edema noted to the lateral aspect of the left foot and no obvious skin break cap refill less than 2 sec 2+ dorsalis pedis and posterior tibial pulses bilaterally   Neurological: She is alert and oriented to person, place, and time.   Skin: Capillary refill takes less than 2 seconds. No abscess noted. There is erythema.         ED Course   Procedures  Labs Reviewed - No data to display       Imaging Results    None                            ED Course as of Aug 22 1736   Thu Aug 22, 2019   1731 No obvious fractures noted    [AT]      ED Course User Index  [AT] Nito Solomon MD     Clinical Impression:       ICD-10-CM ICD-9-CM   1. Left foot pain M79.672 729.5                                Nito Solomon MD  08/22/19 1736

## 2019-11-18 ENCOUNTER — LAB VISIT (OUTPATIENT)
Dept: LAB | Facility: HOSPITAL | Age: 64
End: 2019-11-18
Attending: INTERNAL MEDICINE
Payer: MEDICARE

## 2019-11-18 DIAGNOSIS — I10 ESSENTIAL HYPERTENSION, MALIGNANT: ICD-10-CM

## 2019-11-18 DIAGNOSIS — E78.5 HYPERLIPEMIA: Primary | ICD-10-CM

## 2019-11-18 LAB
ALBUMIN SERPL BCP-MCNC: 3.4 G/DL (ref 3.5–5.2)
ALP SERPL-CCNC: 107 U/L (ref 55–135)
ALT SERPL W/O P-5'-P-CCNC: 16 U/L (ref 10–44)
ANION GAP SERPL CALC-SCNC: 7 MMOL/L (ref 8–16)
AST SERPL-CCNC: 33 U/L (ref 10–40)
BASOPHILS # BLD AUTO: 0.03 K/UL (ref 0–0.2)
BASOPHILS NFR BLD: 0.8 % (ref 0–1.9)
BILIRUB SERPL-MCNC: 0.7 MG/DL (ref 0.1–1)
BUN SERPL-MCNC: 14 MG/DL (ref 8–23)
CALCIUM SERPL-MCNC: 8.9 MG/DL (ref 8.7–10.5)
CHLORIDE SERPL-SCNC: 100 MMOL/L (ref 95–110)
CHOLEST SERPL-MCNC: 165 MG/DL (ref 120–199)
CHOLEST/HDLC SERPL: 2.9 {RATIO} (ref 2–5)
CO2 SERPL-SCNC: 30 MMOL/L (ref 23–29)
CREAT SERPL-MCNC: 0.9 MG/DL (ref 0.5–1.4)
DIFFERENTIAL METHOD: ABNORMAL
EOSINOPHIL # BLD AUTO: 0.2 K/UL (ref 0–0.5)
EOSINOPHIL NFR BLD: 4.9 % (ref 0–8)
ERYTHROCYTE [DISTWIDTH] IN BLOOD BY AUTOMATED COUNT: 14.8 % (ref 11.5–14.5)
EST. GFR  (AFRICAN AMERICAN): >60 ML/MIN/1.73 M^2
EST. GFR  (NON AFRICAN AMERICAN): >60 ML/MIN/1.73 M^2
GLUCOSE SERPL-MCNC: 89 MG/DL (ref 70–110)
HCT VFR BLD AUTO: 36.4 % (ref 37–48.5)
HDLC SERPL-MCNC: 57 MG/DL (ref 40–75)
HDLC SERPL: 34.5 % (ref 20–50)
HGB BLD-MCNC: 11.5 G/DL (ref 12–16)
IMM GRANULOCYTES # BLD AUTO: 0.01 K/UL (ref 0–0.04)
IMM GRANULOCYTES NFR BLD AUTO: 0.3 % (ref 0–0.5)
LDLC SERPL CALC-MCNC: 87.4 MG/DL (ref 63–159)
LYMPHOCYTES # BLD AUTO: 1.7 K/UL (ref 1–4.8)
LYMPHOCYTES NFR BLD: 43.9 % (ref 18–48)
MCH RBC QN AUTO: 29.9 PG (ref 27–31)
MCHC RBC AUTO-ENTMCNC: 31.6 G/DL (ref 32–36)
MCV RBC AUTO: 95 FL (ref 82–98)
MONOCYTES # BLD AUTO: 0.5 K/UL (ref 0.3–1)
MONOCYTES NFR BLD: 12.5 % (ref 4–15)
NEUTROPHILS # BLD AUTO: 1.5 K/UL (ref 1.8–7.7)
NEUTROPHILS NFR BLD: 37.6 % (ref 38–73)
NONHDLC SERPL-MCNC: 108 MG/DL
NRBC BLD-RTO: 0 /100 WBC
PLATELET # BLD AUTO: 229 K/UL (ref 150–350)
PMV BLD AUTO: 9.8 FL (ref 9.2–12.9)
POTASSIUM SERPL-SCNC: 3.8 MMOL/L (ref 3.5–5.1)
PROT SERPL-MCNC: 6.9 G/DL (ref 6–8.4)
RBC # BLD AUTO: 3.85 M/UL (ref 4–5.4)
SODIUM SERPL-SCNC: 137 MMOL/L (ref 136–145)
TRIGL SERPL-MCNC: 103 MG/DL (ref 30–150)
WBC # BLD AUTO: 3.85 K/UL (ref 3.9–12.7)

## 2019-11-18 PROCEDURE — 80061 LIPID PANEL: CPT

## 2019-11-18 PROCEDURE — 85025 COMPLETE CBC W/AUTO DIFF WBC: CPT

## 2019-11-18 PROCEDURE — 36415 COLL VENOUS BLD VENIPUNCTURE: CPT

## 2019-11-18 PROCEDURE — 80053 COMPREHEN METABOLIC PANEL: CPT

## 2020-09-24 DIAGNOSIS — Z12.31 ENCOUNTER FOR SCREENING MAMMOGRAM FOR MALIGNANT NEOPLASM OF BREAST: Primary | ICD-10-CM

## 2020-10-08 ENCOUNTER — HOSPITAL ENCOUNTER (OUTPATIENT)
Dept: RADIOLOGY | Facility: HOSPITAL | Age: 65
Discharge: HOME | End: 2020-10-08
Attending: INTERNAL MEDICINE
Payer: MEDICARE

## 2020-10-08 DIAGNOSIS — Z12.31 ENCOUNTER FOR SCREENING MAMMOGRAM FOR MALIGNANT NEOPLASM OF BREAST: ICD-10-CM

## 2020-10-08 PROCEDURE — 77067 SCR MAMMO BI INCL CAD: CPT | Mod: TC,PO

## 2020-10-14 ENCOUNTER — LAB VISIT (OUTPATIENT)
Dept: LAB | Facility: HOSPITAL | Age: 65
End: 2020-10-14
Attending: INTERNAL MEDICINE
Payer: MEDICARE

## 2020-10-14 DIAGNOSIS — I10 ESSENTIAL HYPERTENSION, MALIGNANT: ICD-10-CM

## 2020-10-14 DIAGNOSIS — E78.5 HYPERLIPEMIA: Primary | ICD-10-CM

## 2020-10-14 LAB
ALBUMIN SERPL BCP-MCNC: 3.5 G/DL (ref 3.5–5.2)
ALP SERPL-CCNC: 86 U/L (ref 55–135)
ALT SERPL W/O P-5'-P-CCNC: 17 U/L (ref 10–44)
ANION GAP SERPL CALC-SCNC: 8 MMOL/L (ref 8–16)
AST SERPL-CCNC: 33 U/L (ref 10–40)
BASOPHILS # BLD AUTO: 0.02 K/UL (ref 0–0.2)
BASOPHILS NFR BLD: 0.4 % (ref 0–1.9)
BILIRUB SERPL-MCNC: 0.8 MG/DL (ref 0.1–1)
BUN SERPL-MCNC: 9 MG/DL (ref 8–23)
CALCIUM SERPL-MCNC: 9.1 MG/DL (ref 8.7–10.5)
CHLORIDE SERPL-SCNC: 102 MMOL/L (ref 95–110)
CHOLEST SERPL-MCNC: 168 MG/DL (ref 120–199)
CHOLEST/HDLC SERPL: 3.1 {RATIO} (ref 2–5)
CO2 SERPL-SCNC: 30 MMOL/L (ref 23–29)
CREAT SERPL-MCNC: 0.7 MG/DL (ref 0.5–1.4)
DIFFERENTIAL METHOD: ABNORMAL
EOSINOPHIL # BLD AUTO: 0.4 K/UL (ref 0–0.5)
EOSINOPHIL NFR BLD: 8.1 % (ref 0–8)
ERYTHROCYTE [DISTWIDTH] IN BLOOD BY AUTOMATED COUNT: 13.1 % (ref 11.5–14.5)
EST. GFR  (AFRICAN AMERICAN): >60 ML/MIN/1.73 M^2
EST. GFR  (NON AFRICAN AMERICAN): >60 ML/MIN/1.73 M^2
GLUCOSE SERPL-MCNC: 96 MG/DL (ref 70–110)
HCT VFR BLD AUTO: 37.9 % (ref 37–48.5)
HDLC SERPL-MCNC: 55 MG/DL (ref 40–75)
HDLC SERPL: 32.7 % (ref 20–50)
HGB BLD-MCNC: 12 G/DL (ref 12–16)
IMM GRANULOCYTES # BLD AUTO: 0.02 K/UL (ref 0–0.04)
IMM GRANULOCYTES NFR BLD AUTO: 0.4 % (ref 0–0.5)
LDLC SERPL CALC-MCNC: 95.2 MG/DL (ref 63–159)
LYMPHOCYTES # BLD AUTO: 1.3 K/UL (ref 1–4.8)
LYMPHOCYTES NFR BLD: 29.2 % (ref 18–48)
MCH RBC QN AUTO: 32.2 PG (ref 27–31)
MCHC RBC AUTO-ENTMCNC: 31.7 G/DL (ref 32–36)
MCV RBC AUTO: 102 FL (ref 82–98)
MONOCYTES # BLD AUTO: 0.5 K/UL (ref 0.3–1)
MONOCYTES NFR BLD: 10.8 % (ref 4–15)
NEUTROPHILS # BLD AUTO: 2.3 K/UL (ref 1.8–7.7)
NEUTROPHILS NFR BLD: 51.1 % (ref 38–73)
NONHDLC SERPL-MCNC: 113 MG/DL
NRBC BLD-RTO: 0 /100 WBC
PLATELET # BLD AUTO: 189 K/UL (ref 150–350)
PMV BLD AUTO: 9.5 FL (ref 9.2–12.9)
POTASSIUM SERPL-SCNC: 4.2 MMOL/L (ref 3.5–5.1)
PROT SERPL-MCNC: 6.7 G/DL (ref 6–8.4)
RBC # BLD AUTO: 3.73 M/UL (ref 4–5.4)
SODIUM SERPL-SCNC: 140 MMOL/L (ref 136–145)
TRIGL SERPL-MCNC: 89 MG/DL (ref 30–150)
WBC # BLD AUTO: 4.55 K/UL (ref 3.9–12.7)

## 2020-10-14 PROCEDURE — 36415 COLL VENOUS BLD VENIPUNCTURE: CPT

## 2020-10-14 PROCEDURE — 85025 COMPLETE CBC W/AUTO DIFF WBC: CPT

## 2020-10-14 PROCEDURE — 80053 COMPREHEN METABOLIC PANEL: CPT

## 2020-10-14 PROCEDURE — 80061 LIPID PANEL: CPT

## 2021-11-22 DIAGNOSIS — I10 ESSENTIAL HYPERTENSION, MALIGNANT: ICD-10-CM

## 2021-11-22 DIAGNOSIS — E78.5 HYPERLIPEMIA: Primary | ICD-10-CM

## 2021-11-22 DIAGNOSIS — Z12.31 SCREENING MAMMOGRAM FOR HIGH-RISK PATIENT: Primary | ICD-10-CM

## 2021-12-08 ENCOUNTER — HOSPITAL ENCOUNTER (OUTPATIENT)
Dept: RADIOLOGY | Facility: HOSPITAL | Age: 66
Discharge: HOME | End: 2021-12-08
Attending: INTERNAL MEDICINE
Payer: MEDICARE

## 2021-12-08 DIAGNOSIS — Z12.31 SCREENING MAMMOGRAM FOR HIGH-RISK PATIENT: ICD-10-CM

## 2021-12-08 PROCEDURE — 77067 SCR MAMMO BI INCL CAD: CPT | Mod: TC,PO

## 2022-01-27 ENCOUNTER — OFFICE VISIT (OUTPATIENT)
Dept: URGENT CARE | Facility: CLINIC | Age: 67
End: 2022-01-27
Payer: MEDICARE

## 2022-01-27 VITALS
RESPIRATION RATE: 16 BRPM | BODY MASS INDEX: 36.8 KG/M2 | OXYGEN SATURATION: 97 % | HEART RATE: 70 BPM | HEIGHT: 62 IN | SYSTOLIC BLOOD PRESSURE: 108 MMHG | WEIGHT: 200 LBS | TEMPERATURE: 97 F | DIASTOLIC BLOOD PRESSURE: 73 MMHG

## 2022-01-27 DIAGNOSIS — Z20.822 ENCOUNTER FOR LABORATORY TESTING FOR COVID-19 VIRUS: Primary | ICD-10-CM

## 2022-01-27 DIAGNOSIS — U07.1 COVID-19 VIRUS DETECTED: ICD-10-CM

## 2022-01-27 LAB
CTP QC/QA: YES
SARS-COV-2 AG RESP QL IA.RAPID: POSITIVE

## 2022-01-27 PROCEDURE — 1159F MED LIST DOCD IN RCRD: CPT | Mod: CPTII,S$GLB,, | Performed by: NURSE PRACTITIONER

## 2022-01-27 PROCEDURE — 1160F RVW MEDS BY RX/DR IN RCRD: CPT | Mod: CPTII,S$GLB,, | Performed by: NURSE PRACTITIONER

## 2022-01-27 PROCEDURE — 3078F PR MOST RECENT DIASTOLIC BLOOD PRESSURE < 80 MM HG: ICD-10-PCS | Mod: CPTII,S$GLB,, | Performed by: NURSE PRACTITIONER

## 2022-01-27 PROCEDURE — 3074F PR MOST RECENT SYSTOLIC BLOOD PRESSURE < 130 MM HG: ICD-10-PCS | Mod: CPTII,S$GLB,, | Performed by: NURSE PRACTITIONER

## 2022-01-27 PROCEDURE — 1160F PR REVIEW ALL MEDS BY PRESCRIBER/CLIN PHARMACIST DOCUMENTED: ICD-10-PCS | Mod: CPTII,S$GLB,, | Performed by: NURSE PRACTITIONER

## 2022-01-27 PROCEDURE — 99203 OFFICE O/P NEW LOW 30 MIN: CPT | Mod: S$GLB,CS,, | Performed by: NURSE PRACTITIONER

## 2022-01-27 PROCEDURE — 3008F BODY MASS INDEX DOCD: CPT | Mod: CPTII,S$GLB,, | Performed by: NURSE PRACTITIONER

## 2022-01-27 PROCEDURE — 87811 SARS-COV-2 COVID19 W/OPTIC: CPT | Mod: S$GLB,CS,, | Performed by: NURSE PRACTITIONER

## 2022-01-27 PROCEDURE — 99203 PR OFFICE/OUTPT VISIT, NEW, LEVL III, 30-44 MIN: ICD-10-PCS | Mod: S$GLB,CS,, | Performed by: NURSE PRACTITIONER

## 2022-01-27 PROCEDURE — 3008F PR BODY MASS INDEX (BMI) DOCUMENTED: ICD-10-PCS | Mod: CPTII,S$GLB,, | Performed by: NURSE PRACTITIONER

## 2022-01-27 PROCEDURE — 3074F SYST BP LT 130 MM HG: CPT | Mod: CPTII,S$GLB,, | Performed by: NURSE PRACTITIONER

## 2022-01-27 PROCEDURE — 1159F PR MEDICATION LIST DOCUMENTED IN MEDICAL RECORD: ICD-10-PCS | Mod: CPTII,S$GLB,, | Performed by: NURSE PRACTITIONER

## 2022-01-27 PROCEDURE — 87811 SARS CORONAVIRUS 2 ANTIGEN POCT, MANUAL READ: ICD-10-PCS | Mod: S$GLB,CS,, | Performed by: NURSE PRACTITIONER

## 2022-01-27 PROCEDURE — 3078F DIAST BP <80 MM HG: CPT | Mod: CPTII,S$GLB,, | Performed by: NURSE PRACTITIONER

## 2022-01-27 NOTE — PROGRESS NOTES
"Subjective:       Patient ID: Megha Robb is a 66 y.o. female.    Vitals:  height is 5' 2" (1.575 m) and weight is 90.7 kg (200 lb). Her temperature is 97.4 °F (36.3 °C). Her blood pressure is 108/73 and her pulse is 70. Her respiration is 16 and oxygen saturation is 97%.     Chief Complaint: No chief complaint on file.    Rapid covid test for cruise on Saturday.  Denies symptoms.      Constitution: Negative for chills, sweating and fever.   HENT: Negative for congestion and sore throat.    Cardiovascular: Negative for chest pain.   Respiratory: Positive for shortness of breath. Negative for chest tightness and cough.    Gastrointestinal: Negative for nausea, vomiting and diarrhea.       Objective:      Physical Exam   Constitutional: She is oriented to person, place, and time. She appears well-developed.  Non-toxic appearance. She does not appear ill. No distress. obesity  HENT:   Head: Normocephalic and atraumatic.   Mouth/Throat: Oropharynx is clear and moist.   Eyes: Conjunctivae and EOM are normal.   Cardiovascular: Normal rate, regular rhythm and normal heart sounds.   Pulmonary/Chest: Effort normal and breath sounds normal.   Abdominal: Normal appearance.   Musculoskeletal: Normal range of motion.         General: Normal range of motion.   Neurological: no focal deficit. She is alert and oriented to person, place, and time.   Skin: Skin is warm, dry and not diaphoretic. Capillary refill takes 2 to 3 seconds.   Psychiatric: Her behavior is normal. Mood normal.   Nursing note and vitals reviewed.        Assessment:       1. Encounter for laboratory testing for COVID-19 virus    2. COVID-19 virus detected          Plan:         Encounter for laboratory testing for COVID-19 virus  -     SARS Coronavirus 2 Antigen, POCT Manual Read    COVID-19 virus detected                   "

## 2022-06-24 DIAGNOSIS — Z79.891 LONG-TERM CURRENT USE OF OPIATE ANALGESIC: ICD-10-CM

## 2022-06-24 DIAGNOSIS — M48.062 SPINAL STENOSIS, LUMBAR REGION, WITH NEUROGENIC CLAUDICATION: Primary | ICD-10-CM

## 2022-06-24 DIAGNOSIS — M47.812 SPONDYLOSIS WITHOUT MYELOPATHY OR RADICULOPATHY, CERVICAL REGION: ICD-10-CM

## 2022-06-24 DIAGNOSIS — M25.561 PAIN IN RIGHT KNEE: ICD-10-CM

## 2022-06-24 DIAGNOSIS — M17.0 BILATERAL PRIMARY OSTEOARTHRITIS OF KNEE: ICD-10-CM

## 2022-06-24 DIAGNOSIS — M47.817 SPONDYLOSIS WITHOUT MYELOPATHY OR RADICULOPATHY, LUMBOSACRAL REGION: ICD-10-CM

## 2022-06-24 DIAGNOSIS — M25.562 PAIN IN LEFT KNEE: ICD-10-CM

## 2022-07-14 ENCOUNTER — HOSPITAL ENCOUNTER (OUTPATIENT)
Dept: RADIOLOGY | Facility: HOSPITAL | Age: 67
Discharge: HOME | End: 2022-07-14
Attending: PAIN MEDICINE
Payer: MEDICARE

## 2022-07-14 DIAGNOSIS — M48.062 SPINAL STENOSIS, LUMBAR REGION, WITH NEUROGENIC CLAUDICATION: ICD-10-CM

## 2022-07-14 DIAGNOSIS — M47.817 SPONDYLOSIS WITHOUT MYELOPATHY OR RADICULOPATHY, LUMBOSACRAL REGION: ICD-10-CM

## 2022-07-14 DIAGNOSIS — Z79.891 LONG-TERM CURRENT USE OF OPIATE ANALGESIC: ICD-10-CM

## 2022-07-14 DIAGNOSIS — M25.561 PAIN IN RIGHT KNEE: ICD-10-CM

## 2022-07-14 DIAGNOSIS — M25.562 PAIN IN LEFT KNEE: ICD-10-CM

## 2022-07-14 DIAGNOSIS — M17.0 BILATERAL PRIMARY OSTEOARTHRITIS OF KNEE: ICD-10-CM

## 2022-07-14 DIAGNOSIS — M47.812 SPONDYLOSIS WITHOUT MYELOPATHY OR RADICULOPATHY, CERVICAL REGION: ICD-10-CM

## 2022-07-14 PROCEDURE — 72114 X-RAY EXAM L-S SPINE BENDING: CPT | Mod: TC,PO

## 2022-07-14 PROCEDURE — 72148 MRI LUMBAR SPINE W/O DYE: CPT | Mod: TC,PO

## 2022-12-05 DIAGNOSIS — Z12.31 ENCOUNTER FOR SCREENING MAMMOGRAM FOR MALIGNANT NEOPLASM OF BREAST: Primary | ICD-10-CM

## 2022-12-22 ENCOUNTER — HOSPITAL ENCOUNTER (OUTPATIENT)
Dept: RADIOLOGY | Facility: HOSPITAL | Age: 67
Discharge: HOME | End: 2022-12-22
Attending: INTERNAL MEDICINE
Payer: MEDICARE

## 2022-12-22 DIAGNOSIS — Z12.31 ENCOUNTER FOR SCREENING MAMMOGRAM FOR MALIGNANT NEOPLASM OF BREAST: ICD-10-CM

## 2022-12-22 PROCEDURE — 77063 BREAST TOMOSYNTHESIS BI: CPT | Mod: TC,PO

## 2022-12-22 PROCEDURE — 77067 SCR MAMMO BI INCL CAD: CPT | Mod: TC,PO

## 2023-05-17 ENCOUNTER — HOSPITAL ENCOUNTER (EMERGENCY)
Facility: HOSPITAL | Age: 68
Discharge: HOME | End: 2023-05-17
Attending: EMERGENCY MEDICINE
Payer: MEDICARE

## 2023-05-17 VITALS
HEART RATE: 74 BPM | SYSTOLIC BLOOD PRESSURE: 114 MMHG | WEIGHT: 280 LBS | BODY MASS INDEX: 47.8 KG/M2 | DIASTOLIC BLOOD PRESSURE: 74 MMHG | OXYGEN SATURATION: 98 % | RESPIRATION RATE: 18 BRPM | TEMPERATURE: 98 F | HEIGHT: 64 IN

## 2023-05-17 DIAGNOSIS — L02.211 ABDOMINAL WALL ABSCESS: Primary | ICD-10-CM

## 2023-05-17 PROCEDURE — 87077 CULTURE AEROBIC IDENTIFY: CPT | Performed by: EMERGENCY MEDICINE

## 2023-05-17 PROCEDURE — 99283 EMERGENCY DEPT VISIT LOW MDM: CPT | Mod: 25

## 2023-05-17 PROCEDURE — 87070 CULTURE OTHR SPECIMN AEROBIC: CPT | Performed by: EMERGENCY MEDICINE

## 2023-05-17 PROCEDURE — 10060 I&D ABSCESS SIMPLE/SINGLE: CPT

## 2023-05-17 PROCEDURE — 25000003 PHARM REV CODE 250: Performed by: EMERGENCY MEDICINE

## 2023-05-17 PROCEDURE — 87186 SC STD MICRODIL/AGAR DIL: CPT | Performed by: EMERGENCY MEDICINE

## 2023-05-17 PROCEDURE — 25000003 PHARM REV CODE 250

## 2023-05-17 PROCEDURE — 87147 CULTURE TYPE IMMUNOLOGIC: CPT | Performed by: EMERGENCY MEDICINE

## 2023-05-17 RX ORDER — DOXYCYCLINE 100 MG/1
100 CAPSULE ORAL EVERY 12 HOURS
Status: DISCONTINUED | OUTPATIENT
Start: 2023-05-17 | End: 2023-05-17 | Stop reason: HOSPADM

## 2023-05-17 RX ORDER — MUPIROCIN 20 MG/G
1 OINTMENT TOPICAL ONCE
Status: COMPLETED | OUTPATIENT
Start: 2023-05-17 | End: 2023-05-17

## 2023-05-17 RX ORDER — MUPIROCIN 20 MG/G
OINTMENT TOPICAL 3 TIMES DAILY
Qty: 22 G | Refills: 0 | Status: SHIPPED | OUTPATIENT
Start: 2023-05-17 | End: 2024-03-25

## 2023-05-17 RX ORDER — LIDOCAINE HYDROCHLORIDE 10 MG/ML
INJECTION, SOLUTION EPIDURAL; INFILTRATION; INTRACAUDAL; PERINEURAL
Status: COMPLETED
Start: 2023-05-17 | End: 2023-05-17

## 2023-05-17 RX ORDER — DOXYCYCLINE 100 MG/1
100 CAPSULE ORAL 2 TIMES DAILY
Qty: 20 CAPSULE | Refills: 0 | Status: SHIPPED | OUTPATIENT
Start: 2023-05-17 | End: 2023-05-27

## 2023-05-17 RX ORDER — LIDOCAINE HYDROCHLORIDE 10 MG/ML
5 INJECTION, SOLUTION EPIDURAL; INFILTRATION; INTRACAUDAL; PERINEURAL
Status: COMPLETED | OUTPATIENT
Start: 2023-05-17 | End: 2023-05-17

## 2023-05-17 RX ADMIN — LIDOCAINE HYDROCHLORIDE 50 MG: 10 INJECTION, SOLUTION EPIDURAL; INFILTRATION; INTRACAUDAL; PERINEURAL at 01:05

## 2023-05-17 RX ADMIN — MUPIROCIN 22 G: 20 OINTMENT TOPICAL at 03:05

## 2023-05-17 NOTE — ED PROVIDER NOTES
Encounter Date: 5/17/2023       History     Chief Complaint   Patient presents with    ABD WOUND     LOWER AT OLD INCISION SITE     Patient with extensive past surgical history.  Patient reports tenderness and redness to lower abdominal surgical incision.  No fever chills.  No trauma.  No similar symptoms in the past.    Review of patient's allergies indicates:   Allergen Reactions    Bee sting [allergen ext-venom-honey bee] Shortness Of Breath    Pcn [penicillins] Hives    Vancomycin analogues Rash     Past Medical History:   Diagnosis Date    Anemia     Cancer     Hypertension     Osteoarthritis of midfoot 9/11/2017     Past Surgical History:   Procedure Laterality Date    breast reduction      EYE SURGERY Bilateral     PHACO    GALLBLADDER SURGERY      GASTRIC RESTRICTION SURGERY      HYSTERECTOMY      JOINT REPLACEMENT      left knee replacement      april 2017    LIPOSUCTION      to legs x3    TONSILLECTOMY      TOTAL REDUCTION MAMMOPLASTY      tummy tuck      VARICOSE VEIN SURGERY      WRIST ARTHROPLASTY       Family History   Problem Relation Age of Onset    Heart attack Mother     Heart attack Father      Social History     Tobacco Use    Smoking status: Never    Smokeless tobacco: Never   Substance Use Topics    Alcohol use: Yes     Alcohol/week: 2.0 standard drinks     Types: 1 Shots of liquor, 1 Cans of beer per week     Comment: weekends    Drug use: No     Review of Systems   Constitutional:  Negative for chills and fever.   HENT:  Negative for congestion.    Eyes:  Negative for visual disturbance.   Respiratory:  Negative for shortness of breath.    Cardiovascular:  Negative for chest pain and palpitations.   Gastrointestinal:  Negative for abdominal pain and vomiting.   Genitourinary:  Negative for dysuria.   Musculoskeletal:  Negative for joint swelling.   Skin:  Positive for wound.   Neurological:  Negative for headaches.   Psychiatric/Behavioral:  Negative for confusion.      Physical Exam      Initial Vitals   BP Pulse Resp Temp SpO2   05/17/23 1227 05/17/23 1227 05/17/23 1227 05/17/23 1229 05/17/23 1227   114/74 74 18 98 °F (36.7 °C) 98 %      MAP       --                Physical Exam    Nursing note and vitals reviewed.  Constitutional: She is not diaphoretic. No distress.   HENT:   Head: Normocephalic and atraumatic.   Eyes: Conjunctivae are normal.   Neck:   Normal range of motion.  Cardiovascular:  Normal rate.           Pulmonary/Chest: Breath sounds normal.   Abdominal: Abdomen is soft. Bowel sounds are normal. There is no abdominal tenderness.   Patient well-healed midline surgical incision.  At inferior margin there is an area of erythema and induration measuring approximately 1 x 1 cm.   Musculoskeletal:         General: Normal range of motion.      Cervical back: Normal range of motion.     Neurological: She is alert. She has normal strength. No cranial nerve deficit or sensory deficit.   No gross deficits   Skin: No rash noted.   Psychiatric: She has a normal mood and affect.       ED Course   I & D - Incision and Drainage    Date/Time: 5/17/2023 2:34 PM  Location procedure was performed: [facility] EMERGENCY DEPARTMENT  Performed by: Lele Encarnacion MD  Authorized by: Lele Encarnacion MD   Type: abscess  Body area: trunk  Location details: abdomen  Anesthesia: local infiltration    Anesthesia:  Local Anesthetic: lidocaine 1% without epinephrine  Anesthetic total: 1 mL  Scalpel size: 11  Complexity: simple  Drainage: purulent  Drainage amount: scant  Wound treatment: wound left open      Labs Reviewed   CULTURE, AEROBIC  (SPECIFY SOURCE)          Imaging Results    None          Medications   doxycycline capsule 100 mg (has no administration in time range)   mupirocin 2 % ointment 22 g (has no administration in time range)   LIDOcaine (PF) 10 mg/ml (1%) injection 50 mg (50 mg Infiltration Given 5/17/23 1315)     Medical Decision Making:   History:   Old Medical Records: I decided to obtain  old medical records.  ED Management:  Patient presents with superficial abscess.  Status post I& D. Will begin Bactroban and doxycycline.  No evidence of deep abscess.  No evidence other abdominal pathology.                          Clinical Impression:   Final diagnoses:  [L02.211] Abdominal wall abscess (Primary)        ED Disposition Condition    Discharge Stable          ED Prescriptions       Medication Sig Dispense Start Date End Date Auth. Provider    doxycycline (VIBRAMYCIN) 100 MG Cap Take 1 capsule (100 mg total) by mouth 2 (two) times daily. for 10 days 20 capsule 5/17/2023 5/27/2023 Lele Encarnacion MD    mupirocin (BACTROBAN) 2 % ointment Apply topically 3 (three) times daily. 22 g 5/17/2023 -- Lele Encarnacion MD          Follow-up Information    None          Lele Encarnacion MD  05/17/23 [id number]

## 2023-05-19 LAB — BACTERIA SPEC AEROBE CULT: ABNORMAL

## 2023-05-30 ENCOUNTER — HOSPITAL ENCOUNTER (EMERGENCY)
Facility: HOSPITAL | Age: 68
Discharge: HOME | End: 2023-05-30
Attending: EMERGENCY MEDICINE
Payer: MEDICARE

## 2023-05-30 VITALS
HEART RATE: 65 BPM | DIASTOLIC BLOOD PRESSURE: 74 MMHG | HEIGHT: 63 IN | BODY MASS INDEX: 49.61 KG/M2 | WEIGHT: 280 LBS | TEMPERATURE: 99 F | OXYGEN SATURATION: 99 % | SYSTOLIC BLOOD PRESSURE: 126 MMHG | RESPIRATION RATE: 18 BRPM

## 2023-05-30 DIAGNOSIS — B02.9 HERPES ZOSTER WITHOUT COMPLICATION: Primary | ICD-10-CM

## 2023-05-30 DIAGNOSIS — A41.9 SEPSIS: ICD-10-CM

## 2023-05-30 LAB
ALBUMIN SERPL BCP-MCNC: 3.4 G/DL (ref 3.5–5.2)
ALP SERPL-CCNC: 113 U/L (ref 55–135)
ALT SERPL W/O P-5'-P-CCNC: 16 U/L (ref 10–44)
ANION GAP SERPL CALC-SCNC: 6 MMOL/L (ref 8–16)
AST SERPL-CCNC: 33 U/L (ref 10–40)
BACTERIA #/AREA URNS HPF: NEGATIVE /HPF
BASOPHILS # BLD AUTO: 0.03 K/UL (ref 0–0.2)
BASOPHILS NFR BLD: 0.6 % (ref 0–1.9)
BILIRUB SERPL-MCNC: 0.6 MG/DL (ref 0.1–1)
BILIRUB UR QL STRIP: NEGATIVE
BUN SERPL-MCNC: 16 MG/DL (ref 8–23)
CALCIUM SERPL-MCNC: 9 MG/DL (ref 8.7–10.5)
CHLORIDE SERPL-SCNC: 105 MMOL/L (ref 95–110)
CLARITY UR: CLEAR
CO2 SERPL-SCNC: 28 MMOL/L (ref 23–29)
COLOR UR: YELLOW
CREAT SERPL-MCNC: 0.7 MG/DL (ref 0.5–1.4)
DIFFERENTIAL METHOD: ABNORMAL
EOSINOPHIL # BLD AUTO: 0.2 K/UL (ref 0–0.5)
EOSINOPHIL NFR BLD: 4.8 % (ref 0–8)
ERYTHROCYTE [DISTWIDTH] IN BLOOD BY AUTOMATED COUNT: 13.4 % (ref 11.5–14.5)
EST. GFR  (NO RACE VARIABLE): >60 ML/MIN/1.73 M^2
GLUCOSE SERPL-MCNC: 90 MG/DL (ref 70–110)
GLUCOSE UR QL STRIP: NEGATIVE
HCT VFR BLD AUTO: 37.2 % (ref 37–48.5)
HGB BLD-MCNC: 11.7 G/DL (ref 12–16)
HGB UR QL STRIP: ABNORMAL
HYALINE CASTS #/AREA URNS LPF: 1 /LPF
IMM GRANULOCYTES # BLD AUTO: 0.01 K/UL (ref 0–0.04)
IMM GRANULOCYTES NFR BLD AUTO: 0.2 % (ref 0–0.5)
KETONES UR QL STRIP: NEGATIVE
LACTATE SERPL-SCNC: 0.7 MMOL/L (ref 0.5–1.9)
LEUKOCYTE ESTERASE UR QL STRIP: ABNORMAL
LYMPHOCYTES # BLD AUTO: 1.5 K/UL (ref 1–4.8)
LYMPHOCYTES NFR BLD: 32.3 % (ref 18–48)
MCH RBC QN AUTO: 31.5 PG (ref 27–31)
MCHC RBC AUTO-ENTMCNC: 31.5 G/DL (ref 32–36)
MCV RBC AUTO: 100 FL (ref 82–98)
MICROSCOPIC COMMENT: NORMAL
MONOCYTES # BLD AUTO: 0.5 K/UL (ref 0.3–1)
MONOCYTES NFR BLD: 10.5 % (ref 4–15)
NEUTROPHILS # BLD AUTO: 2.5 K/UL (ref 1.8–7.7)
NEUTROPHILS NFR BLD: 51.6 % (ref 38–73)
NITRITE UR QL STRIP: NEGATIVE
NRBC BLD-RTO: 0 /100 WBC
PH UR STRIP: 6 [PH] (ref 5–8)
PLATELET # BLD AUTO: 192 K/UL (ref 150–450)
PMV BLD AUTO: 9.6 FL (ref 9.2–12.9)
POTASSIUM SERPL-SCNC: 4.1 MMOL/L (ref 3.5–5.1)
PROT SERPL-MCNC: 6.8 G/DL (ref 6–8.4)
PROT UR QL STRIP: NEGATIVE
RBC # BLD AUTO: 3.72 M/UL (ref 4–5.4)
RBC #/AREA URNS HPF: 1 /HPF (ref 0–4)
SODIUM SERPL-SCNC: 139 MMOL/L (ref 136–145)
SP GR UR STRIP: 1.01 (ref 1–1.03)
SQUAMOUS #/AREA URNS HPF: 0 /HPF
URN SPEC COLLECT METH UR: ABNORMAL
UROBILINOGEN UR STRIP-ACNC: NEGATIVE EU/DL
WBC # BLD AUTO: 4.77 K/UL (ref 3.9–12.7)
WBC #/AREA URNS HPF: 3 /HPF (ref 0–5)

## 2023-05-30 PROCEDURE — 87040 BLOOD CULTURE FOR BACTERIA: CPT | Performed by: EMERGENCY MEDICINE

## 2023-05-30 PROCEDURE — 93010 ELECTROCARDIOGRAM REPORT: CPT | Mod: ,,, | Performed by: INTERNAL MEDICINE

## 2023-05-30 PROCEDURE — 93010 EKG 12-LEAD: ICD-10-PCS | Mod: ,,, | Performed by: INTERNAL MEDICINE

## 2023-05-30 PROCEDURE — 83605 ASSAY OF LACTIC ACID: CPT | Performed by: EMERGENCY MEDICINE

## 2023-05-30 PROCEDURE — 81001 URINALYSIS AUTO W/SCOPE: CPT | Performed by: EMERGENCY MEDICINE

## 2023-05-30 PROCEDURE — 93005 ELECTROCARDIOGRAM TRACING: CPT | Performed by: INTERNAL MEDICINE

## 2023-05-30 PROCEDURE — 85025 COMPLETE CBC W/AUTO DIFF WBC: CPT | Performed by: EMERGENCY MEDICINE

## 2023-05-30 PROCEDURE — 63600175 PHARM REV CODE 636 W HCPCS: Performed by: EMERGENCY MEDICINE

## 2023-05-30 PROCEDURE — 80053 COMPREHEN METABOLIC PANEL: CPT | Performed by: EMERGENCY MEDICINE

## 2023-05-30 PROCEDURE — 99284 EMERGENCY DEPT VISIT MOD MDM: CPT | Mod: 25

## 2023-05-30 RX ORDER — PREDNISONE 50 MG/1
50 TABLET ORAL DAILY
Qty: 7 TABLET | Refills: 0 | Status: SHIPPED | OUTPATIENT
Start: 2023-05-30 | End: 2023-06-06

## 2023-05-30 RX ORDER — PREDNISONE 20 MG/1
60 TABLET ORAL
Status: COMPLETED | OUTPATIENT
Start: 2023-05-30 | End: 2023-05-30

## 2023-05-30 RX ORDER — ACYCLOVIR 200 MG/1
800 CAPSULE ORAL ONCE
Status: DISCONTINUED | OUTPATIENT
Start: 2023-05-30 | End: 2023-05-30

## 2023-05-30 RX ORDER — ACYCLOVIR 800 MG/1
800 TABLET ORAL
Qty: 35 TABLET | Refills: 0 | Status: SHIPPED | OUTPATIENT
Start: 2023-05-30 | End: 2023-05-30 | Stop reason: CLARIF

## 2023-05-30 RX ADMIN — SODIUM CHLORIDE, SODIUM LACTATE, POTASSIUM CHLORIDE, AND CALCIUM CHLORIDE 3810 ML: .6; .31; .03; .02 INJECTION, SOLUTION INTRAVENOUS at 07:05

## 2023-05-30 RX ADMIN — PREDNISONE 60 MG: 20 TABLET ORAL at 08:05

## 2023-05-30 NOTE — FIRST PROVIDER EVALUATION
"Medical screening examination initiated.  I have conducted a focused provider triage encounter, findings are as follows:    Brief history of present illness:  Patient received a letter 3 days ago to return to the hospital for positive blood cultures.    Vitals:    05/30/23 1756 05/30/23 1801 05/30/23 1831   BP: 137/66 129/65 131/71   Pulse: 71 70 67   Resp: 18     Temp: 98.7 °F (37.1 °C)     TempSrc: Oral     SpO2: 99% 98% 100%   Weight: 127 kg (280 lb)     Height: 5' 3" (1.6 m)         Pertinent physical exam:  Small wound mid lower abdomen.  Draining pus    Brief workup plan:  Labs and assessment    Preliminary workup initiated; this workup will be continued and followed by the physician or advanced practice provider that is assigned to the patient when roomed.  "

## 2023-05-31 NOTE — ED PROVIDER NOTES
Encounter Date: 5/30/2023       History     Chief Complaint   Patient presents with    abnormal labs     Pt called to return for + cultures     Chief complaint is return for evaluation for positive wound culture of staph aureus.  Patient had an I&D 13 days ago placed on doxycycline she is here because she was notified she had a positive wound culture for staph.  She has no complaints of fever chills earache sore throat runny nose.  She does complain of a 2 day history of rash to the left cheek that is pruritic and was vesicular 1 point.  No complaints of eye pain no periorbital rash.      Review of patient's allergies indicates:   Allergen Reactions    Bee sting [allergen ext-venom-honey bee] Shortness Of Breath    Pcn [penicillins] Hives    Vancomycin analogues Rash     Past Medical History:   Diagnosis Date    Anemia     Cancer     Hypertension     Osteoarthritis of midfoot 9/11/2017     Past Surgical History:   Procedure Laterality Date    breast reduction      EYE SURGERY Bilateral     PHACO    GALLBLADDER SURGERY      GASTRIC RESTRICTION SURGERY      HYSTERECTOMY      JOINT REPLACEMENT      left knee replacement      april 2017    LIPOSUCTION      to legs x3    TONSILLECTOMY      TOTAL REDUCTION MAMMOPLASTY      tummy tuck      VARICOSE VEIN SURGERY      WRIST ARTHROPLASTY       Family History   Problem Relation Age of Onset    Heart attack Mother     Heart attack Father      Social History     Tobacco Use    Smoking status: Never    Smokeless tobacco: Never   Substance Use Topics    Alcohol use: Yes     Alcohol/week: 2.0 standard drinks     Types: 1 Shots of liquor, 1 Cans of beer per week     Comment: weekends    Drug use: No     Review of Systems   Skin:  Positive for rash.     Physical Exam     Initial Vitals [05/30/23 1756]   BP Pulse Resp Temp SpO2   137/66 71 18 98.7 °F (37.1 °C) 99 %      MAP       --         Physical Exam    Nursing note and vitals reviewed.  Constitutional: She appears  well-developed and well-nourished.   HENT:   Head: Normocephalic and atraumatic.   Eyes: Conjunctivae, EOM and lids are normal. Pupils are equal, round, and reactive to light.   Neck: Trachea normal. Neck supple. No thyroid mass and no thyromegaly present.   Normal range of motion.  Cardiovascular:  Normal rate, regular rhythm and normal heart sounds.           Pulmonary/Chest: Effort normal and breath sounds normal.   Abdominal: Abdomen is soft. There is no abdominal tenderness.   Musculoskeletal:         General: Normal range of motion.      Cervical back: Normal range of motion and neck supple.     Neurological: She is alert and oriented to person, place, and time. She has normal strength and normal reflexes. No cranial nerve deficit or sensory deficit.   Skin: Skin is warm and dry.   Healing lower abdominal wound Pink rash to left cheek   Psychiatric: She has a normal mood and affect. Her speech is normal and behavior is normal. Judgment and thought content normal.             ED Course   Procedures  Labs Reviewed   CBC W/ AUTO DIFFERENTIAL - Abnormal; Notable for the following components:       Result Value    RBC 3.72 (*)     Hemoglobin 11.7 (*)      (*)     MCH 31.5 (*)     MCHC 31.5 (*)     All other components within normal limits   COMPREHENSIVE METABOLIC PANEL - Abnormal; Notable for the following components:    Albumin 3.4 (*)     Anion Gap 6 (*)     All other components within normal limits   URINALYSIS, REFLEX TO URINE CULTURE - Abnormal; Notable for the following components:    Occult Blood UA Trace (*)     Leukocytes, UA 1+ (*)     All other components within normal limits    Narrative:     Specimen Source->Urine   CULTURE, BLOOD    Narrative:     Aerobic and anaerobic   CULTURE, BLOOD    Narrative:     Aerobic and anaerobic   LACTIC ACID, PLASMA   URINALYSIS MICROSCOPIC    Narrative:     Specimen Source->Urine          Imaging Results              X-Ray Chest AP Portable (In process)                       Medications   lactated ringers bolus 3,810 mL (0 mLs Intravenous Stopped 5/30/23 2015)   predniSONE tablet 60 mg (60 mg Oral Given 5/30/23 2029)     Medical Decision Making:   ED Management:  Chief complaint is positive wound culture for staph aureus.  The patient has a healing wound on my exam on the abdominal wall.  She does have a rash left cheek but on reexam she has a rash to the left forehead and left forearm and was working outside.  This appears to be more of a contact dermatitis than herpes zoster therefore will not give her the acyclovir this point to take.  She will need follow-up and will be instructed to use Benadryl for the itching.                        Clinical Impression:   Final diagnoses:  [B02.9] Herpes zoster without complication (Primary)        ED Disposition Condition    Discharge Stable          ED Prescriptions       Medication Sig Dispense Start Date End Date Auth. Provider    acyclovir (ZOVIRAX) 800 MG Tab  (Status: Discontinued) Take 1 tablet (800 mg total) by mouth 5 (five) times daily. for 7 days 35 tablet 5/30/2023 5/30/2023 Luther Montes De Oca MD    predniSONE (DELTASONE) 50 MG Tab Take 1 tablet (50 mg total) by mouth once daily. for 7 days 7 tablet 5/30/2023 6/6/2023 Luther Montes De Oca MD          Follow-up Information    None          Luther Montes De Oca MD  05/31/23 [id number]

## 2023-06-03 ENCOUNTER — HOSPITAL ENCOUNTER (EMERGENCY)
Facility: HOSPITAL | Age: 68
Discharge: HOME | End: 2023-06-03
Attending: EMERGENCY MEDICINE
Payer: MEDICARE

## 2023-06-03 VITALS
BODY MASS INDEX: 43.41 KG/M2 | WEIGHT: 245 LBS | OXYGEN SATURATION: 99 % | DIASTOLIC BLOOD PRESSURE: 86 MMHG | TEMPERATURE: 99 F | HEART RATE: 68 BPM | RESPIRATION RATE: 18 BRPM | HEIGHT: 63 IN | SYSTOLIC BLOOD PRESSURE: 143 MMHG

## 2023-06-03 DIAGNOSIS — R10.9 ABDOMINAL PAIN, UNSPECIFIED ABDOMINAL LOCATION: Primary | ICD-10-CM

## 2023-06-03 DIAGNOSIS — R93.89 ABNORMAL CHEST X-RAY: ICD-10-CM

## 2023-06-03 LAB
ALBUMIN SERPL BCP-MCNC: 3.3 G/DL (ref 3.5–5.2)
ALP SERPL-CCNC: 121 U/L (ref 55–135)
ALT SERPL W/O P-5'-P-CCNC: 39 U/L (ref 10–44)
ANION GAP SERPL CALC-SCNC: 5 MMOL/L (ref 8–16)
AST SERPL-CCNC: 63 U/L (ref 10–40)
BASOPHILS # BLD AUTO: 0.02 K/UL (ref 0–0.2)
BASOPHILS NFR BLD: 0.5 % (ref 0–1.9)
BILIRUB SERPL-MCNC: 0.7 MG/DL (ref 0.1–1)
BILIRUB UR QL STRIP: NEGATIVE
BUN SERPL-MCNC: 10 MG/DL (ref 8–23)
CALCIUM SERPL-MCNC: 8.8 MG/DL (ref 8.7–10.5)
CHLORIDE SERPL-SCNC: 105 MMOL/L (ref 95–110)
CLARITY UR: CLEAR
CO2 SERPL-SCNC: 28 MMOL/L (ref 23–29)
COLOR UR: YELLOW
CREAT SERPL-MCNC: 0.6 MG/DL (ref 0.5–1.4)
CREAT SERPL-MCNC: 0.7 MG/DL (ref 0.5–1.4)
DIFFERENTIAL METHOD: ABNORMAL
EOSINOPHIL # BLD AUTO: 0.3 K/UL (ref 0–0.5)
EOSINOPHIL NFR BLD: 6.6 % (ref 0–8)
ERYTHROCYTE [DISTWIDTH] IN BLOOD BY AUTOMATED COUNT: 13.4 % (ref 11.5–14.5)
EST. GFR  (NO RACE VARIABLE): >60 ML/MIN/1.73 M^2
GLUCOSE SERPL-MCNC: 94 MG/DL (ref 70–110)
GLUCOSE UR QL STRIP: NEGATIVE
HCT VFR BLD AUTO: 36.5 % (ref 37–48.5)
HGB BLD-MCNC: 11.9 G/DL (ref 12–16)
HGB UR QL STRIP: NEGATIVE
IMM GRANULOCYTES # BLD AUTO: 0.01 K/UL (ref 0–0.04)
IMM GRANULOCYTES NFR BLD AUTO: 0.3 % (ref 0–0.5)
KETONES UR QL STRIP: NEGATIVE
LEUKOCYTE ESTERASE UR QL STRIP: NEGATIVE
LIPASE SERPL-CCNC: 38 U/L (ref 4–60)
LYMPHOCYTES # BLD AUTO: 1 K/UL (ref 1–4.8)
LYMPHOCYTES NFR BLD: 26.3 % (ref 18–48)
MCH RBC QN AUTO: 31.6 PG (ref 27–31)
MCHC RBC AUTO-ENTMCNC: 32.6 G/DL (ref 32–36)
MCV RBC AUTO: 97 FL (ref 82–98)
MONOCYTES # BLD AUTO: 0.4 K/UL (ref 0.3–1)
MONOCYTES NFR BLD: 10.6 % (ref 4–15)
NEUTROPHILS # BLD AUTO: 2.1 K/UL (ref 1.8–7.7)
NEUTROPHILS NFR BLD: 55.7 % (ref 38–73)
NITRITE UR QL STRIP: NEGATIVE
NRBC BLD-RTO: 0 /100 WBC
PH UR STRIP: 8 [PH] (ref 5–8)
PLATELET # BLD AUTO: 212 K/UL (ref 150–450)
PMV BLD AUTO: 9.1 FL (ref 9.2–12.9)
POTASSIUM SERPL-SCNC: 4.1 MMOL/L (ref 3.5–5.1)
PROT SERPL-MCNC: 6.7 G/DL (ref 6–8.4)
PROT UR QL STRIP: NEGATIVE
RBC # BLD AUTO: 3.77 M/UL (ref 4–5.4)
SAMPLE: NORMAL
SODIUM SERPL-SCNC: 138 MMOL/L (ref 136–145)
SP GR UR STRIP: 1.02 (ref 1–1.03)
URN SPEC COLLECT METH UR: NORMAL
UROBILINOGEN UR STRIP-ACNC: NEGATIVE EU/DL
WBC # BLD AUTO: 3.76 K/UL (ref 3.9–12.7)

## 2023-06-03 PROCEDURE — 96360 HYDRATION IV INFUSION INIT: CPT | Mod: 59

## 2023-06-03 PROCEDURE — 81003 URINALYSIS AUTO W/O SCOPE: CPT | Performed by: EMERGENCY MEDICINE

## 2023-06-03 PROCEDURE — 83690 ASSAY OF LIPASE: CPT | Performed by: EMERGENCY MEDICINE

## 2023-06-03 PROCEDURE — 25000003 PHARM REV CODE 250: Performed by: EMERGENCY MEDICINE

## 2023-06-03 PROCEDURE — 96360 HYDRATION IV INFUSION INIT: CPT

## 2023-06-03 PROCEDURE — 25500020 PHARM REV CODE 255: Performed by: EMERGENCY MEDICINE

## 2023-06-03 PROCEDURE — 99285 EMERGENCY DEPT VISIT HI MDM: CPT | Mod: 25

## 2023-06-03 PROCEDURE — 85025 COMPLETE CBC W/AUTO DIFF WBC: CPT | Performed by: EMERGENCY MEDICINE

## 2023-06-03 PROCEDURE — 80053 COMPREHEN METABOLIC PANEL: CPT | Performed by: EMERGENCY MEDICINE

## 2023-06-03 PROCEDURE — 96361 HYDRATE IV INFUSION ADD-ON: CPT

## 2023-06-03 RX ORDER — SODIUM CHLORIDE 9 MG/ML
1000 INJECTION, SOLUTION INTRAVENOUS
Status: COMPLETED | OUTPATIENT
Start: 2023-06-03 | End: 2023-06-03

## 2023-06-03 RX ADMIN — IOHEXOL 100 ML: 350 INJECTION, SOLUTION INTRAVENOUS at 10:06

## 2023-06-03 RX ADMIN — SODIUM CHLORIDE 1000 ML: 0.9 INJECTION, SOLUTION INTRAVENOUS at 11:06

## 2023-06-03 NOTE — ED NOTES
"REPORTS ABDOMINAL PAIN.  SMALL OPENING AT LOW ABDOMEN OLD SURGICAL SITE. NO DRAINAGE OR ODOR.  BILATERAL HIP AREA SCABS AT OLD SURGERY SITES. DIFFUSE OPEN SORES, SMALL AT ARMS AND LEGS. RED RASH ACROSS FACE BRIDGE, FLAT.  PRIVATE ROOM. EVEN AND NON LABORED RESPIRATIONS.  AIRWAY CLEAR.  PULSES REGULAR.  < 3" CAPILLARY REFILL. SKIN WD.  MAEW.  NON DISTENDED OBESE ABDOMEN. ALERT, ORIENTED AND AMBULATORY. CALM AT THIS TIME.  CALL LIGHT IN REACH. FALLS PRECAUTIONS.  FAMILY AT BS.   "

## 2023-06-03 NOTE — ED PROVIDER NOTES
Encounter Date: 6/3/2023       History     Chief Complaint   Patient presents with    Abdominal Pain     Called last night and told to come back to ED     Patient was called back to the emergency department secondary to abnormal x-ray and also complains of abdominal pain.  Patient having abdominal pain and bloating for the last couple of days.  At the worst symptoms are mild-to-moderate.  Nothing makes it better or worse.  She is not had this before.  She denies vomiting.  She denies any bloody diarrhea.    Review of patient's allergies indicates:   Allergen Reactions    Bee sting [allergen ext-venom-honey bee] Shortness Of Breath    Pcn [penicillins] Hives    Vancomycin analogues Rash     Past Medical History:   Diagnosis Date    Anemia     Cancer     Hypertension     Osteoarthritis of midfoot 9/11/2017     Past Surgical History:   Procedure Laterality Date    breast reduction      EYE SURGERY Bilateral     PHACO    GALLBLADDER SURGERY      GASTRIC RESTRICTION SURGERY      HYSTERECTOMY      JOINT REPLACEMENT      left knee replacement      april 2017    LIPOSUCTION      to legs x3    TONSILLECTOMY      TOTAL REDUCTION MAMMOPLASTY      tummy tuck      VARICOSE VEIN SURGERY      WRIST ARTHROPLASTY       Family History   Problem Relation Age of Onset    Heart attack Mother     Heart attack Father      Social History     Tobacco Use    Smoking status: Never    Smokeless tobacco: Never   Substance Use Topics    Alcohol use: Yes     Alcohol/week: 2.0 standard drinks     Types: 1 Shots of liquor, 1 Cans of beer per week     Comment: weekends    Drug use: No     Review of Systems   All other systems reviewed and are negative.    Physical Exam     Initial Vitals [06/03/23 0934]   BP Pulse Resp Temp SpO2   (!) 145/93 82 18 98.5 °F (36.9 °C) 99 %      MAP       --         Physical Exam    Nursing note and vitals reviewed.  Constitutional: She appears well-developed and well-nourished.   Pleasant, polite   HENT:   Head:  Normocephalic and atraumatic.   Mouth/Throat: Oropharynx is clear and moist.   Eyes: EOM are normal.   Neck: Neck supple.   Normal range of motion.  Cardiovascular:  Intact distal pulses.           Pulmonary/Chest: No respiratory distress.   Abdominal: Abdomen is soft. She exhibits no distension. There is abdominal tenderness. There is no rebound.   Musculoskeletal:         General: Normal range of motion.      Cervical back: Normal range of motion and neck supple.     Neurological: She is alert and oriented to person, place, and time. She has normal strength.   Skin: Skin is warm and dry. Capillary refill takes less than 2 seconds.   Psychiatric: She has a normal mood and affect. Her behavior is normal. Judgment and thought content normal.       ED Course   Procedures  Labs Reviewed   CBC W/ AUTO DIFFERENTIAL - Abnormal; Notable for the following components:       Result Value    WBC 3.76 (*)     RBC 3.77 (*)     Hemoglobin 11.9 (*)     Hematocrit 36.5 (*)     MCH 31.6 (*)     MPV 9.1 (*)     All other components within normal limits   COMPREHENSIVE METABOLIC PANEL - Abnormal; Notable for the following components:    Albumin 3.3 (*)     AST 63 (*)     Anion Gap 5 (*)     All other components within normal limits   LIPASE   URINALYSIS, REFLEX TO URINE CULTURE    Narrative:     Specimen Source->Urine   ISTAT CREATININE   POCT CREATININE          Imaging Results              X-Ray Chest PA And Lateral (Final result)  Result time 06/03/23 12:07:41      Final result by Ruperto Sims MD (06/03/23 12:07:41)                   Narrative:    CLINICAL HISTORY:  68 years (1955) Female Pulmonary fibrosis    TECHNIQUE:  PA and lateral radiograph of the chest.    COMPARISON:  Radiograph from May 30, 2023.    FINDINGS:  Mild scattered reticular lung markings are seen with a peripheral predominance compatible with the patient's history of interstitial lung disease, improved from the previous exam. Costophrenic angles are  seen without effusion. No pneumothorax is identified. The heart is normal in size. Atheromatous calcifications are seen at the aortic arch. Osseous structures show degenerative changes in the spine. The visualized upper abdomen is unremarkable.    IMPRESSION:  Improved, now mild peripheral scattered reticular interstitial lung markings.                  .            Electronically signed by:  Ruperto Sims MD  6/3/2023 12:07 PM CDT Workstation: [id number]                                     CT Abdomen Pelvis With Contrast (Final result)  Result time 06/03/23 11:17:30      Final result by Ruperto Sims MD (06/03/23 11:17:30)                   Narrative:    CMS MANDATED QUALITY DATA - CT RADIATION  436    All CT scans at this facility utilize dose modulation, iterative reconstruction, and/or weight based dosing when appropriate to reduce radiation dose to as low as reasonably achievable.    CLINICAL HISTORY:  68 years (1955) Female Abdominal abscess/infection suspected    TECHNIQUE:  CT ABDOMEN PELVIS WITH IV CONTRAST. Axial CT images of the abdomen and pelvis were obtained from the dome of the diaphragm to the proximal thigh.    COMPARISON:  None available.    FINDINGS:.  Lower Thorax:  The lung bases show scattered peripheral reticular lung markings suspicious for a very early/mild UIP pattern of ILD. There is a moderate size hiatal hernia, and suture in the upper abdomen compatible with prior gastric surgery. The heart is normal in size. No pleural or pericardial effusion is seen.    CT Abdomen:  Liver: Relative diffuse low-attenuation liver consistent with hepatic steatosis. The liver is enlarged measuring 18.9 cm sagittal right lobe.  Gallbladder: Surgically absent.  Biliary Tree: The CBD is mildly prominent measuring 13 mm in diameter.  Spleen: Within normal limits.  Pancreas: The pancreas is normal.  Adrenal Glands: The adrenal glands appear within normal limits.  Kidneys: No  hydronephrosis/hydroureter or evidence of obstructive uropathy. There is a 1.6 in meters simple cyst in the posterior interpolar aspect of the left kidney.  Vasculature: The aorta is normal in course and caliber.  Lymph nodes: No abdominal lymphadenopathy is seen.  Intraperitoneal structures: There is no ascites.  Bowel: Mild sigmoid predominant diverticulosis without focal diverticulitis. There is a moderate volume of stool and gas in the colon with a nonobstructive bowel gas pattern, consider correlation for constipation.  Abdominal wall: The abdominal wall and musculature are normal.  Musculoskeletal: No acute osseous abnormality is identified . Grade 1 anterolisthesis of L4 on L5 (5 mm) with bilateral transpedicular screw and ariel fixation from L4-S1. There appears to have been laminectomy at L5. Intervertebral discectomy fusion is seen at L4-L5 and L5-S1.    CT Pelvis:  Bladder: The urinary bladder is within normal limits.  Reproductive Organs: The uterus is not visualized/surgically absent.  Pelvic Lymph nodes: No pelvic lymphadenopathy or mass is identified.    IMPRESSION:  1. Moderate volume of stool and gas in the colon with a nonobstructive bowel gas pattern, consider correlation for constipation. No finding of bowel obstruction, intra-abdominal free air or abscess.    2. Prior gastric surgery with a moderate size hiatal hernia.    3. Mild, sigmoid predominant diverticulosis without acute diverticulitis.    4. Prior cholecystectomy with prominence of the CBD, consider correlation with liver function tests and for history of sphincterotomy after cholecystectomy (alternatively ERCP/MRCP can be performed if further characterization is desired).    5. Additional and incidental findings as above.                .    Electronically signed by:  Ruperto Sims MD  6/3/2023 11:17 AM CDT Workstation: [id number]                                     Medications   0.9%  NaCl infusion (1,000 mLs Intravenous New Bag  6/3/23 1129)   iohexoL (OMNIPAQUE 350) injection 100 mL (100 mLs Intravenous Given 6/3/23 1043)     Medical Decision Making:   Initial Assessment:   No apparent distress  Differential Diagnosis:   Considerations include but are not limited to diverticulitis, colitis, acute kidney injury, dehydration, electrolyte abnormalities  Clinical Tests:   Lab Tests: Ordered and Reviewed  Radiological Study: Reviewed and Ordered  Medical Tests: Reviewed and Ordered  ED Management:  [facility]    Patient presents for emergent evaluation of acute abdominal pain and abnormal chest x-ray that poses a threat to life and/or bodily function.    In the ED patient found to have acute abdominal pain, chest x-ray with reticular markings.    I ordered labs and personally reviewed them.  Labs significant for no acute abnormality.  I ordered X-rays and personally reviewed them and reviewed the radiologist interpretation.  Xray significant for reticular marking.    I ordered CT scan and personally reviewed it and reviewed the radiologist interpretation.  CT significant for no evidence of diverticulitis.      Discharge [facility]    Patient was managed in the ED with IV normal saline.    The response to treatment was improved.  Patient with no emergent findings in the emergency department.  Abdomen soft nontender nonsurgical.  Patient was discharged in stable condition.  Detailed return precautions discussed.  Patient advised that chest x-ray findings advised to follow up with pulmonology                        Clinical Impression:   Final diagnoses:  [R10.9] Abdominal pain, unspecified abdominal location (Primary)  [R93.89] Abnormal chest x-ray        ED Disposition Condition    Discharge Stable          ED Prescriptions    None       Follow-up Information       Follow up With Specialties Details Why Contact Info    Cyrus Patel MD Internal Medicine Schedule an appointment as soon as possible for a visit in 2 days  [address] [place] [address] [address]  [place] [address]  [phone]      Emilie Abraham MD Pulmonary Disease, Sleep Medicine   [address] [facility]  [address]  [place] [address]  [phone]               Jv Bush MD  06/03/23 3927

## 2023-06-04 LAB
BACTERIA BLD CULT: NORMAL
BACTERIA BLD CULT: NORMAL

## 2023-07-10 ENCOUNTER — LAB VISIT (OUTPATIENT)
Dept: LAB | Facility: HOSPITAL | Age: 68
End: 2023-07-10
Attending: INTERNAL MEDICINE
Payer: MEDICARE

## 2023-07-10 DIAGNOSIS — I10 ESSENTIAL HYPERTENSION, MALIGNANT: Primary | ICD-10-CM

## 2023-07-10 DIAGNOSIS — E78.2 MIXED HYPERLIPIDEMIA: ICD-10-CM

## 2023-07-10 LAB
ALBUMIN SERPL BCP-MCNC: 3.2 G/DL (ref 3.5–5.2)
ALP SERPL-CCNC: 104 U/L (ref 55–135)
ALT SERPL W/O P-5'-P-CCNC: 14 U/L (ref 10–44)
ANION GAP SERPL CALC-SCNC: 3 MMOL/L (ref 8–16)
AST SERPL-CCNC: 24 U/L (ref 10–40)
BASOPHILS # BLD AUTO: 0.02 K/UL (ref 0–0.2)
BASOPHILS NFR BLD: 0.4 % (ref 0–1.9)
BILIRUB SERPL-MCNC: 0.6 MG/DL (ref 0.1–1)
BUN SERPL-MCNC: 13 MG/DL (ref 8–23)
CALCIUM SERPL-MCNC: 8.6 MG/DL (ref 8.7–10.5)
CHLORIDE SERPL-SCNC: 106 MMOL/L (ref 95–110)
CHOLEST SERPL-MCNC: 196 MG/DL (ref 120–199)
CHOLEST/HDLC SERPL: 3.1 {RATIO} (ref 2–5)
CO2 SERPL-SCNC: 29 MMOL/L (ref 23–29)
CREAT SERPL-MCNC: 0.6 MG/DL (ref 0.5–1.4)
DIFFERENTIAL METHOD: ABNORMAL
EOSINOPHIL # BLD AUTO: 0.3 K/UL (ref 0–0.5)
EOSINOPHIL NFR BLD: 5.7 % (ref 0–8)
ERYTHROCYTE [DISTWIDTH] IN BLOOD BY AUTOMATED COUNT: 14.1 % (ref 11.5–14.5)
EST. GFR  (NO RACE VARIABLE): >60 ML/MIN/1.73 M^2
GLUCOSE SERPL-MCNC: 89 MG/DL (ref 70–110)
HCT VFR BLD AUTO: 39 % (ref 37–48.5)
HDLC SERPL-MCNC: 63 MG/DL (ref 40–75)
HDLC SERPL: 32.1 % (ref 20–50)
HGB BLD-MCNC: 12.1 G/DL (ref 12–16)
IMM GRANULOCYTES # BLD AUTO: 0.02 K/UL (ref 0–0.04)
IMM GRANULOCYTES NFR BLD AUTO: 0.4 % (ref 0–0.5)
LDLC SERPL CALC-MCNC: 113.2 MG/DL (ref 63–159)
LYMPHOCYTES # BLD AUTO: 1.5 K/UL (ref 1–4.8)
LYMPHOCYTES NFR BLD: 28.5 % (ref 18–48)
MCH RBC QN AUTO: 31.2 PG (ref 27–31)
MCHC RBC AUTO-ENTMCNC: 31 G/DL (ref 32–36)
MCV RBC AUTO: 101 FL (ref 82–98)
MONOCYTES # BLD AUTO: 0.7 K/UL (ref 0.3–1)
MONOCYTES NFR BLD: 12.4 % (ref 4–15)
NEUTROPHILS # BLD AUTO: 2.8 K/UL (ref 1.8–7.7)
NEUTROPHILS NFR BLD: 52.6 % (ref 38–73)
NONHDLC SERPL-MCNC: 133 MG/DL
NRBC BLD-RTO: 0 /100 WBC
PLATELET # BLD AUTO: 234 K/UL (ref 150–450)
PMV BLD AUTO: 9.5 FL (ref 9.2–12.9)
POTASSIUM SERPL-SCNC: 4.2 MMOL/L (ref 3.5–5.1)
PROT SERPL-MCNC: 6.4 G/DL (ref 6–8.4)
RBC # BLD AUTO: 3.88 M/UL (ref 4–5.4)
SODIUM SERPL-SCNC: 138 MMOL/L (ref 136–145)
TRIGL SERPL-MCNC: 99 MG/DL (ref 30–150)
WBC # BLD AUTO: 5.4 K/UL (ref 3.9–12.7)

## 2023-07-10 PROCEDURE — 80053 COMPREHEN METABOLIC PANEL: CPT | Performed by: INTERNAL MEDICINE

## 2023-07-10 PROCEDURE — 36415 COLL VENOUS BLD VENIPUNCTURE: CPT | Performed by: INTERNAL MEDICINE

## 2023-07-10 PROCEDURE — 85025 COMPLETE CBC W/AUTO DIFF WBC: CPT | Performed by: INTERNAL MEDICINE

## 2023-07-10 PROCEDURE — 80061 LIPID PANEL: CPT | Performed by: INTERNAL MEDICINE

## 2023-07-19 NOTE — H&P (VIEW-ONLY)
Subjective:       Patient ID: Megha Robb is a 68 y.o. female Body mass index is 41.55 kg/m².    Chief Complaint: Abdominal Pain    This patient is new to me.  Referring Provider: Sander Self for abdominal pain.           GI Problem  The primary symptoms include fatigue, abdominal pain (Lower abdominal pain started 2 months ago, 5/17/23 ED visit for abdominal wall abscess I&D was done pt given antbx completed antbx, states pain is still there even though abscess was removed, returned to ED 6/3/23 for abdominal pain CT Scan completed), myalgias (hx back sx/knee sx takes opioids prescribed by pain mng) and arthralgias. Primary symptoms do not include fever, weight loss, nausea, vomiting, diarrhea, melena (states stool is brown, does take daily iron PO for hx ESTEPHANIA states pcp manages ESTEPHANIA), hematemesis, jaundice, hematochezia or dysuria. Episode onset: Hx of gastric bypass with tummy tuck occured in her 40's, has had cholecystectomy and an appendectomy.   The abdominal pain has been unchanged since its onset. The abdominal pain is located in the LLQ, RLQ and epigastric region. The abdominal pain radiates to the RUQ and LUQ. Pain scale: currently no pain, when pain is on 8/10. The abdominal pain is relieved by passing flatus and certain positions (sitting sideways helps).   The illness is also significant for dysphagia (states feels like food especially bread and meats get stuck in epigastric area, states has to eat food very slow& cut small occurs approx.1x/month, has to vomit up food occasionally denies coffee grounds, denies difficulty swallowing pills and liquids), bloating (bloating resolved after BM and passing gas) and constipation (Has daily BMs rated type 1-3 on bristol stool, occasionally strains, sometimes feels empty does not take anything for to assist w/ bowels). Associated symptoms comments: CT scan abdomen/pelvis 06/03/2023:  Per radiology IMPRESSION:  1. Moderate volume of stool and gas in the  colon with a nonobstructive bowel gas pattern, consider correlation for constipation. No finding of bowel obstruction, intra-abdominal free air or abscess.  2. Prior gastric surgery with a moderate size hiatal hernia.  3. Mild, sigmoid predominant diverticulosis without acute diverticulitis.  4. Prior cholecystectomy with prominence of the CBD, consider correlation with liver function tests and for history of sphincterotomy after cholecystectomy (alternatively ERCP/MRCP can be performed if further characterization is desired).. Associated medical issues do not include GERD (denies GERD symptoms).   Abdominal Pain  Associated symptoms include arthralgias, constipation (Has daily BMs rated type 1-3 on bristol stool, occasionally strains, sometimes feels empty does not take anything for to assist w/ bowels) and myalgias (hx back sx/knee sx takes opioids prescribed by pain mng). Pertinent negatives include no diarrhea, dysuria, fever, hematochezia, melena (states stool is brown, does take daily iron PO for hx ESTEPHANIA states pcp manages ESTEPHANIA), nausea, vomiting or weight loss. She has tried oral narcotic analgesics (states takes norco and abdominal pain reduces moderately) for the symptoms. There is no history of GERD (denies GERD symptoms).     Review of Systems   Constitutional:  Positive for fatigue. Negative for activity change, appetite change, fever and weight loss.   Gastrointestinal:  Positive for abdominal pain (Lower abdominal pain started 2 months ago, 5/17/23 ED visit for abdominal wall abscess I&D was done pt given antbx completed antbx, states pain is still there even though abscess was removed, returned to ED 6/3/23 for abdominal pain CT Scan completed), bloating (bloating resolved after BM and passing gas), constipation (Has daily BMs rated type 1-3 on bristol stool, occasionally strains, sometimes feels empty does not take anything for to assist w/ bowels) and dysphagia (states feels like food especially bread  and meats get stuck in epigastric area, states has to eat food very slow& cut small occurs approx.1x/month, has to vomit up food occasionally denies coffee grounds, denies difficulty swallowing pills and liquids). Negative for abdominal distention, anal bleeding, blood in stool, diarrhea, hematemesis, hematochezia, jaundice, melena (states stool is brown, does take daily iron PO for hx ESTEPHANIA states pcp manages ESTEPHANIA), nausea and vomiting.   Genitourinary:  Negative for dysuria.   Musculoskeletal:  Positive for arthralgias and myalgias (hx back sx/knee sx takes opioids prescribed by pain mng).       No LMP recorded. Patient has had a hysterectomy.  Past Medical History:   Diagnosis Date    Anemia     Cancer     Diverticulosis     Hypertension     Osteoarthritis of midfoot 09/11/2017     Past Surgical History:   Procedure Laterality Date    APPENDECTOMY      breast reduction      CHOLECYSTECTOMY      EYE SURGERY Bilateral     PHACO    GALLBLADDER SURGERY      GASTRIC RESTRICTION SURGERY      HYSTERECTOMY      JOINT REPLACEMENT      left knee replacement      april 2017    LIPOSUCTION      to legs x3    TONSILLECTOMY      TOTAL REDUCTION MAMMOPLASTY      tummy tuck      VARICOSE VEIN SURGERY      WRIST ARTHROPLASTY       Family History   Problem Relation Age of Onset    Heart attack Mother     Heart attack Father     Stomach cancer Other     Rectal cancer Other     Liver cancer Other     Esophageal cancer Other     Crohn's disease Other     Colon polyps Other     Colon cancer Other      Social History     Tobacco Use    Smoking status: Never    Smokeless tobacco: Never   Substance Use Topics    Alcohol use: Yes     Alcohol/week: 2.0 standard drinks     Types: 1 Shots of liquor, 1 Cans of beer per week     Comment: weekends    Drug use: No     Wt Readings from Last 10 Encounters:   07/21/23 106.4 kg (234 lb 9.1 oz)   06/03/23 111.1 kg (245 lb)   05/30/23 127 kg (280 lb)   05/17/23 127 kg (280 lb)   01/27/22 90.7 kg (200 lb)    08/22/19 90.7 kg (200 lb)   09/28/17 97.3 kg (214 lb 9.6 oz)   09/21/17 90.7 kg (200 lb)   09/08/17 90.7 kg (200 lb)   08/31/17 96.6 kg (213 lb)     Lab Results   Component Value Date    WBC 5.40 07/10/2023    HGB 12.1 07/10/2023    HCT 39.0 07/10/2023     (H) 07/10/2023     07/10/2023     CMP  Sodium   Date Value Ref Range Status   07/10/2023 138 136 - 145 mmol/L Final     Potassium   Date Value Ref Range Status   07/10/2023 4.2 3.5 - 5.1 mmol/L Final     Chloride   Date Value Ref Range Status   07/10/2023 106 95 - 110 mmol/L Final     CO2   Date Value Ref Range Status   07/10/2023 29 23 - 29 mmol/L Final     Glucose   Date Value Ref Range Status   07/10/2023 89 70 - 110 mg/dL Final     BUN   Date Value Ref Range Status   07/10/2023 13 8 - 23 mg/dL Final     Creatinine   Date Value Ref Range Status   07/10/2023 0.6 0.5 - 1.4 mg/dL Final     Calcium   Date Value Ref Range Status   07/10/2023 8.6 (L) 8.7 - 10.5 mg/dL Final     Total Protein   Date Value Ref Range Status   07/10/2023 6.4 6.0 - 8.4 g/dL Final     Albumin   Date Value Ref Range Status   07/10/2023 3.2 (L) 3.5 - 5.2 g/dL Final     Total Bilirubin   Date Value Ref Range Status   07/10/2023 0.6 0.1 - 1.0 mg/dL Final     Comment:     For infants and newborns, interpretation of results should be based  on gestational age, weight and in agreement with clinical  observations.    Premature Infant recommended reference ranges:  Up to 24 hours.............<8.0 mg/dL  Up to 48 hours............<12.0 mg/dL  3-5 days..................<15.0 mg/dL  6-29 days.................<15.0 mg/dL       Alkaline Phosphatase   Date Value Ref Range Status   07/10/2023 104 55 - 135 U/L Final     AST   Date Value Ref Range Status   07/10/2023 24 10 - 40 U/L Final     ALT   Date Value Ref Range Status   07/10/2023 14 10 - 44 U/L Final     Anion Gap   Date Value Ref Range Status   07/10/2023 3 (L) 8 - 16 mmol/L Final     eGFR if    Date Value Ref Range  Status   12/08/2021 >60.0 >60 mL/min/1.73 m^2 Final     eGFR if non    Date Value Ref Range Status   12/08/2021 >60.0 >60 mL/min/1.73 m^2 Final     Comment:     Calculation used to obtain the estimated glomerular filtration  rate (eGFR) is the CKD-EPI equation.        Lab Results   Component Value Date    LIPASE 38 06/03/2023     No results found for: LIPASERES  Lab Results   Component Value Date    TSH 1.8 03/29/2005       Reviewed prior medical records including hospital encounter 6/3/23 radiology report of xray chest 6/3/23,  CT abdomen pelvis 6/3/23 & endoscopy history (see surgical history/procedures).    Objective:      Physical Exam  Vitals and nursing note reviewed.   Constitutional:       Appearance: Normal appearance. She is normal weight.   Cardiovascular:      Rate and Rhythm: Normal rate and regular rhythm.      Heart sounds: Normal heart sounds.   Pulmonary:      Breath sounds: Normal breath sounds.   Abdominal:      General: Bowel sounds are normal.      Palpations: Abdomen is soft.      Tenderness: There is no abdominal tenderness.          Comments: Abscess removal incision light pink healing, dime size, no drainage noted   Skin:     General: Skin is warm and dry.      Coloration: Skin is not jaundiced.   Neurological:      Mental Status: She is alert and oriented to person, place, and time.   Psychiatric:         Mood and Affect: Mood normal.         Behavior: Behavior normal.       Assessment:       1. Lower abdominal pain    2. Epigastric pain    3. Hiatal hernia    4. Dysphagia, unspecified type    5. Bloating    6. Constipation, unspecified constipation type    7. History of diverticulosis    8. Dilated bile duct    9. Abnormal findings on diagnostic imaging of liver and biliary tract    10. Fatty infiltration of liver    11. History of iron deficiency anemia    12. History of gastric bypass    13. Fatigue, unspecified type        Plan:       Lower abdominal pain  -     Case  Request Endoscopy: COLONOSCOPY  - schedule Colonoscopy, discussed procedure with the patient, including risks and benefits, patient verbalized understanding  -OTC Ibgard take as directed on package  -If no improvement in symptoms or symptoms worsen, call/follow-up at clinic or go to ER     Epigastric pain  -   START  pantoprazole (PROTONIX) 40 MG tablet; Take 1 tablet (40 mg total) by mouth once daily.  Dispense: 90 tablet; Refill: 1, Take PPI 30min-1hr before eating breakfast  -     Case Request Endoscopy: EGD (ESOPHAGOGASTRODUODENOSCOPY)  - schedule EGD, discussed procedure with patient, including risks and benefits, patient verbalized understanding  -If no improvement in symptoms or symptoms worsen, call/follow-up at clinic or go to ER    Hiatal hernia  -  START   pantoprazole (PROTONIX) 40 MG tablet; Take 1 tablet (40 mg total) by mouth once daily.  Dispense: 90 tablet; Refill: 1, Take PPI 30min-1hr before eating breakfast  -     Case Request Endoscopy: EGD (ESOPHAGOGASTRODUODENOSCOPY)  -discussed diagnosis with patient & that it is usually managed by controlling reflux symptoms, surgery is an option, but usually performed if reflux is uncontrolled by medication management and lifestyle/dietary modifications; if symptoms persist despite medication management and lifestyle/dietary modifications, we can refer to general surgery to consult about surgical options, patient verbalized understanding    Dysphagia, unspecified type  -     Case Request Endoscopy: EGD (ESOPHAGOGASTRODUODENOSCOPY)  - schedule EGD, discussed procedure with patient and possible esophageal dilation may be performed during procedure if indicated, patient verbalized understanding  - educated patient to eat smaller more frequent meals and to eat slowly and advised to eat a soft diet.  - possible UGI/esophagram/esophageal manometry if symptoms persist    Bloating  -     Case Request Endoscopy: EGD (ESOPHAGOGASTRODUODENOSCOPY)  testing for H.  Pylori typically performed during EGD  - recommend OTC simethicone as directed, such as Phazyme or Gas-x, OTC Ibgard take as directed on package  - recommend low gas diet: Reduce or eliminate these foods from your diet: Broccoli, Cauliflower, [place] sprouts, Cabbage, Cooked dried beans, Carbonated beverages (sparkling water, soda, beer, champagne)  Other Causes Of Excess Gas Include:   1) EATING TOO FAST or TALKING WHILE YOU CHEW may cause you to swallow air. This increases the amount of gas in the stomach and may worsen your symptoms.  --> Chew each mouthful completely before swallowing. Take your time.  2) OVEREATING may increase the feeling of being bloated and cause more gas.  --> When you are full, stop eating.  3) CONSTIPATION can increase the amount of normal intestinal gas.  --> Avoid constipation by increasing the amount of fiber in your diet by including whole cereal grains, fresh vegetables (except those in the above list) and fresh fruits. High-fiber foods absorb water and carry it out of the body. When increasing the amount of fiber in your diet, you also need to increase the amount of water that you drink. You should drink at least eight 8-ounce glasses of water (two quarts) per day.    Constipation, unspecified constipation type  -Recommend daily exercise as tolerated, adequate water intake (six 8-oz glasses of water daily), and high fiber diet. OTC fiber supplements are recommended if diet does not reach daily fiber goal (20-30 grams daily), such as Metamucil, Citrucel, or FiberCon (take as directed, separate from other oral medications by >2 hours).  -Recommend taking an OTC stool softener such as Colace as directed to avoid hard stools and straining with bowel movements PRN  -Recommend trying OTC MiraLax once daily (17g PO) as directed  - If no improvement with above recommendations, try intermittently dosed Dulcolax OTC as directed (every 3-4  days) PRN to facilitate bowel movements  -If still  no improvement with these measures, call/follow-up    History of diverticulosis  -     Case Request Endoscopy: COLONOSCOPY  - schedule Colonoscopy, discussed procedure with the patient, including risks and benefits, patient verbalized understanding    Dilated bile duct  -     MRI MRCP Without Contrast; Future; Expected date: 07/21/2023    Abnormal findings on diagnostic imaging of liver and biliary tract  -     MRI MRCP Without Contrast; Future; Expected date: 07/21/2023    Fatty infiltration of liver  -For fatty liver recommend: low fat, low cholesterol diet, maintain good control of blood sugars and cholesterol levels, exercise, weight loss (if overweight), minimize/avoid alcohol and tylenol products, & follow-up with PCP for continued evaluation and management; if specialist is needed, recommend seeing hepatology.    History of iron deficiency anemia   -Continue taking Iron PO as prescribed  -Follow up with PCP for further evaluation and management    History of gastric bypass   -Continue to f/u with bariatric surgeon    Fatigue, unspecified type   Follow up with PCP for further evaluation and management      Follow up in about 4 weeks (around 8/18/2023), or if symptoms worsen or fail to improve.      If no improvement in symptoms or symptoms worsen, call/follow-up at clinic or go to ER.       [facility] - GASTROENTEROLOGY  [facility], [place]     Dictation software program was used for this note. Please expect some simple typographical  errors in this note.    Encounter includes face to face time and non-face to face time preparing to see the patient (eg, review of tests), obtaining and/or reviewing separately obtained history, documenting clinical information in the electronic or other health record, independently interpreting results (not separately reported) and communicating results to the patient/family/caregiver, or care coordination (not separately reported).

## 2023-07-19 NOTE — PROGRESS NOTES
Subjective:       Patient ID: Megha Robb is a 68 y.o. female Body mass index is 41.55 kg/m².    Chief Complaint: Abdominal Pain    This patient is new to me.  Referring Provider: Sander Self for abdominal pain.           GI Problem  The primary symptoms include fatigue, abdominal pain (Lower abdominal pain started 2 months ago, 5/17/23 ED visit for abdominal wall abscess I&D was done pt given antbx completed antbx, states pain is still there even though abscess was removed, returned to ED 6/3/23 for abdominal pain CT Scan completed), myalgias (hx back sx/knee sx takes opioids prescribed by pain mng) and arthralgias. Primary symptoms do not include fever, weight loss, nausea, vomiting, diarrhea, melena (states stool is brown, does take daily iron PO for hx ESTEPHANIA states pcp manages ESTEPHANIA), hematemesis, jaundice, hematochezia or dysuria. Episode onset: Hx of gastric bypass with tummy tuck occured in her 40's, has had cholecystectomy and an appendectomy.   The abdominal pain has been unchanged since its onset. The abdominal pain is located in the LLQ, RLQ and epigastric region. The abdominal pain radiates to the RUQ and LUQ. Pain scale: currently no pain, when pain is on 8/10. The abdominal pain is relieved by passing flatus and certain positions (sitting sideways helps).   The illness is also significant for dysphagia (states feels like food especially bread and meats get stuck in epigastric area, states has to eat food very slow& cut small occurs approx.1x/month, has to vomit up food occasionally denies coffee grounds, denies difficulty swallowing pills and liquids), bloating (bloating resolved after BM and passing gas) and constipation (Has daily BMs rated type 1-3 on bristol stool, occasionally strains, sometimes feels empty does not take anything for to assist w/ bowels). Associated symptoms comments: CT scan abdomen/pelvis 06/03/2023:  Per radiology IMPRESSION:  1. Moderate volume of stool and gas in the  colon with a nonobstructive bowel gas pattern, consider correlation for constipation. No finding of bowel obstruction, intra-abdominal free air or abscess.  2. Prior gastric surgery with a moderate size hiatal hernia.  3. Mild, sigmoid predominant diverticulosis without acute diverticulitis.  4. Prior cholecystectomy with prominence of the CBD, consider correlation with liver function tests and for history of sphincterotomy after cholecystectomy (alternatively ERCP/MRCP can be performed if further characterization is desired).. Associated medical issues do not include GERD (denies GERD symptoms).   Abdominal Pain  Associated symptoms include arthralgias, constipation (Has daily BMs rated type 1-3 on bristol stool, occasionally strains, sometimes feels empty does not take anything for to assist w/ bowels) and myalgias (hx back sx/knee sx takes opioids prescribed by pain mng). Pertinent negatives include no diarrhea, dysuria, fever, hematochezia, melena (states stool is brown, does take daily iron PO for hx ESTEPHANIA states pcp manages ESTEPHANIA), nausea, vomiting or weight loss. She has tried oral narcotic analgesics (states takes norco and abdominal pain reduces moderately) for the symptoms. There is no history of GERD (denies GERD symptoms).     Review of Systems   Constitutional:  Positive for fatigue. Negative for activity change, appetite change, fever and weight loss.   Gastrointestinal:  Positive for abdominal pain (Lower abdominal pain started 2 months ago, 5/17/23 ED visit for abdominal wall abscess I&D was done pt given antbx completed antbx, states pain is still there even though abscess was removed, returned to ED 6/3/23 for abdominal pain CT Scan completed), bloating (bloating resolved after BM and passing gas), constipation (Has daily BMs rated type 1-3 on bristol stool, occasionally strains, sometimes feels empty does not take anything for to assist w/ bowels) and dysphagia (states feels like food especially bread  and meats get stuck in epigastric area, states has to eat food very slow& cut small occurs approx.1x/month, has to vomit up food occasionally denies coffee grounds, denies difficulty swallowing pills and liquids). Negative for abdominal distention, anal bleeding, blood in stool, diarrhea, hematemesis, hematochezia, jaundice, melena (states stool is brown, does take daily iron PO for hx ESTEPHANIA states pcp manages ESTEPHANIA), nausea and vomiting.   Genitourinary:  Negative for dysuria.   Musculoskeletal:  Positive for arthralgias and myalgias (hx back sx/knee sx takes opioids prescribed by pain mng).       No LMP recorded. Patient has had a hysterectomy.  Past Medical History:   Diagnosis Date    Anemia     Cancer     Diverticulosis     Hypertension     Osteoarthritis of midfoot 09/11/2017     Past Surgical History:   Procedure Laterality Date    APPENDECTOMY      breast reduction      CHOLECYSTECTOMY      EYE SURGERY Bilateral     PHACO    GALLBLADDER SURGERY      GASTRIC RESTRICTION SURGERY      HYSTERECTOMY      JOINT REPLACEMENT      left knee replacement      april 2017    LIPOSUCTION      to legs x3    TONSILLECTOMY      TOTAL REDUCTION MAMMOPLASTY      tummy tuck      VARICOSE VEIN SURGERY      WRIST ARTHROPLASTY       Family History   Problem Relation Age of Onset    Heart attack Mother     Heart attack Father     Stomach cancer Other     Rectal cancer Other     Liver cancer Other     Esophageal cancer Other     Crohn's disease Other     Colon polyps Other     Colon cancer Other      Social History     Tobacco Use    Smoking status: Never    Smokeless tobacco: Never   Substance Use Topics    Alcohol use: Yes     Alcohol/week: 2.0 standard drinks     Types: 1 Shots of liquor, 1 Cans of beer per week     Comment: weekends    Drug use: No     Wt Readings from Last 10 Encounters:   07/21/23 106.4 kg (234 lb 9.1 oz)   06/03/23 111.1 kg (245 lb)   05/30/23 127 kg (280 lb)   05/17/23 127 kg (280 lb)   01/27/22 90.7 kg (200 lb)    08/22/19 90.7 kg (200 lb)   09/28/17 97.3 kg (214 lb 9.6 oz)   09/21/17 90.7 kg (200 lb)   09/08/17 90.7 kg (200 lb)   08/31/17 96.6 kg (213 lb)     Lab Results   Component Value Date    WBC 5.40 07/10/2023    HGB 12.1 07/10/2023    HCT 39.0 07/10/2023     (H) 07/10/2023     07/10/2023     CMP  Sodium   Date Value Ref Range Status   07/10/2023 138 136 - 145 mmol/L Final     Potassium   Date Value Ref Range Status   07/10/2023 4.2 3.5 - 5.1 mmol/L Final     Chloride   Date Value Ref Range Status   07/10/2023 106 95 - 110 mmol/L Final     CO2   Date Value Ref Range Status   07/10/2023 29 23 - 29 mmol/L Final     Glucose   Date Value Ref Range Status   07/10/2023 89 70 - 110 mg/dL Final     BUN   Date Value Ref Range Status   07/10/2023 13 8 - 23 mg/dL Final     Creatinine   Date Value Ref Range Status   07/10/2023 0.6 0.5 - 1.4 mg/dL Final     Calcium   Date Value Ref Range Status   07/10/2023 8.6 (L) 8.7 - 10.5 mg/dL Final     Total Protein   Date Value Ref Range Status   07/10/2023 6.4 6.0 - 8.4 g/dL Final     Albumin   Date Value Ref Range Status   07/10/2023 3.2 (L) 3.5 - 5.2 g/dL Final     Total Bilirubin   Date Value Ref Range Status   07/10/2023 0.6 0.1 - 1.0 mg/dL Final     Comment:     For infants and newborns, interpretation of results should be based  on gestational age, weight and in agreement with clinical  observations.    Premature Infant recommended reference ranges:  Up to 24 hours.............<8.0 mg/dL  Up to 48 hours............<12.0 mg/dL  3-5 days..................<15.0 mg/dL  6-29 days.................<15.0 mg/dL       Alkaline Phosphatase   Date Value Ref Range Status   07/10/2023 104 55 - 135 U/L Final     AST   Date Value Ref Range Status   07/10/2023 24 10 - 40 U/L Final     ALT   Date Value Ref Range Status   07/10/2023 14 10 - 44 U/L Final     Anion Gap   Date Value Ref Range Status   07/10/2023 3 (L) 8 - 16 mmol/L Final     eGFR if    Date Value Ref Range  Status   12/08/2021 >60.0 >60 mL/min/1.73 m^2 Final     eGFR if non    Date Value Ref Range Status   12/08/2021 >60.0 >60 mL/min/1.73 m^2 Final     Comment:     Calculation used to obtain the estimated glomerular filtration  rate (eGFR) is the CKD-EPI equation.        Lab Results   Component Value Date    LIPASE 38 06/03/2023     No results found for: LIPASERES  Lab Results   Component Value Date    TSH 1.8 03/29/2005       Reviewed prior medical records including hospital encounter 6/3/23 radiology report of xray chest 6/3/23,  CT abdomen pelvis 6/3/23 & endoscopy history (see surgical history/procedures).    Objective:      Physical Exam  Vitals and nursing note reviewed.   Constitutional:       Appearance: Normal appearance. She is normal weight.   Cardiovascular:      Rate and Rhythm: Normal rate and regular rhythm.      Heart sounds: Normal heart sounds.   Pulmonary:      Breath sounds: Normal breath sounds.   Abdominal:      General: Bowel sounds are normal.      Palpations: Abdomen is soft.      Tenderness: There is no abdominal tenderness.          Comments: Abscess removal incision light pink healing, dime size, no drainage noted   Skin:     General: Skin is warm and dry.      Coloration: Skin is not jaundiced.   Neurological:      Mental Status: She is alert and oriented to person, place, and time.   Psychiatric:         Mood and Affect: Mood normal.         Behavior: Behavior normal.       Assessment:       1. Lower abdominal pain    2. Epigastric pain    3. Hiatal hernia    4. Dysphagia, unspecified type    5. Bloating    6. Constipation, unspecified constipation type    7. History of diverticulosis    8. Dilated bile duct    9. Abnormal findings on diagnostic imaging of liver and biliary tract    10. Fatty infiltration of liver    11. History of iron deficiency anemia    12. History of gastric bypass    13. Fatigue, unspecified type        Plan:       Lower abdominal pain  -     Case  Request Endoscopy: COLONOSCOPY  - schedule Colonoscopy, discussed procedure with the patient, including risks and benefits, patient verbalized understanding  -OTC Ibgard take as directed on package  -If no improvement in symptoms or symptoms worsen, call/follow-up at clinic or go to ER     Epigastric pain  -   START  pantoprazole (PROTONIX) 40 MG tablet; Take 1 tablet (40 mg total) by mouth once daily.  Dispense: 90 tablet; Refill: 1, Take PPI 30min-1hr before eating breakfast  -     Case Request Endoscopy: EGD (ESOPHAGOGASTRODUODENOSCOPY)  - schedule EGD, discussed procedure with patient, including risks and benefits, patient verbalized understanding  -If no improvement in symptoms or symptoms worsen, call/follow-up at clinic or go to ER    Hiatal hernia  -  START   pantoprazole (PROTONIX) 40 MG tablet; Take 1 tablet (40 mg total) by mouth once daily.  Dispense: 90 tablet; Refill: 1, Take PPI 30min-1hr before eating breakfast  -     Case Request Endoscopy: EGD (ESOPHAGOGASTRODUODENOSCOPY)  -discussed diagnosis with patient & that it is usually managed by controlling reflux symptoms, surgery is an option, but usually performed if reflux is uncontrolled by medication management and lifestyle/dietary modifications; if symptoms persist despite medication management and lifestyle/dietary modifications, we can refer to general surgery to consult about surgical options, patient verbalized understanding    Dysphagia, unspecified type  -     Case Request Endoscopy: EGD (ESOPHAGOGASTRODUODENOSCOPY)  - schedule EGD, discussed procedure with patient and possible esophageal dilation may be performed during procedure if indicated, patient verbalized understanding  - educated patient to eat smaller more frequent meals and to eat slowly and advised to eat a soft diet.  - possible UGI/esophagram/esophageal manometry if symptoms persist    Bloating  -     Case Request Endoscopy: EGD (ESOPHAGOGASTRODUODENOSCOPY)  testing for H.  Pylori typically performed during EGD  - recommend OTC simethicone as directed, such as Phazyme or Gas-x, OTC Ibgard take as directed on package  - recommend low gas diet: Reduce or eliminate these foods from your diet: Broccoli, Cauliflower, [place] sprouts, Cabbage, Cooked dried beans, Carbonated beverages (sparkling water, soda, beer, champagne)  Other Causes Of Excess Gas Include:   1) EATING TOO FAST or TALKING WHILE YOU CHEW may cause you to swallow air. This increases the amount of gas in the stomach and may worsen your symptoms.  --> Chew each mouthful completely before swallowing. Take your time.  2) OVEREATING may increase the feeling of being bloated and cause more gas.  --> When you are full, stop eating.  3) CONSTIPATION can increase the amount of normal intestinal gas.  --> Avoid constipation by increasing the amount of fiber in your diet by including whole cereal grains, fresh vegetables (except those in the above list) and fresh fruits. High-fiber foods absorb water and carry it out of the body. When increasing the amount of fiber in your diet, you also need to increase the amount of water that you drink. You should drink at least eight 8-ounce glasses of water (two quarts) per day.    Constipation, unspecified constipation type  -Recommend daily exercise as tolerated, adequate water intake (six 8-oz glasses of water daily), and high fiber diet. OTC fiber supplements are recommended if diet does not reach daily fiber goal (20-30 grams daily), such as Metamucil, Citrucel, or FiberCon (take as directed, separate from other oral medications by >2 hours).  -Recommend taking an OTC stool softener such as Colace as directed to avoid hard stools and straining with bowel movements PRN  -Recommend trying OTC MiraLax once daily (17g PO) as directed  - If no improvement with above recommendations, try intermittently dosed Dulcolax OTC as directed (every 3-4  days) PRN to facilitate bowel movements  -If still  no improvement with these measures, call/follow-up    History of diverticulosis  -     Case Request Endoscopy: COLONOSCOPY  - schedule Colonoscopy, discussed procedure with the patient, including risks and benefits, patient verbalized understanding    Dilated bile duct  -     MRI MRCP Without Contrast; Future; Expected date: 07/21/2023    Abnormal findings on diagnostic imaging of liver and biliary tract  -     MRI MRCP Without Contrast; Future; Expected date: 07/21/2023    Fatty infiltration of liver  -For fatty liver recommend: low fat, low cholesterol diet, maintain good control of blood sugars and cholesterol levels, exercise, weight loss (if overweight), minimize/avoid alcohol and tylenol products, & follow-up with PCP for continued evaluation and management; if specialist is needed, recommend seeing hepatology.    History of iron deficiency anemia   -Continue taking Iron PO as prescribed  -Follow up with PCP for further evaluation and management    History of gastric bypass   -Continue to f/u with bariatric surgeon    Fatigue, unspecified type   Follow up with PCP for further evaluation and management      Follow up in about 4 weeks (around 8/18/2023), or if symptoms worsen or fail to improve.      If no improvement in symptoms or symptoms worsen, call/follow-up at clinic or go to ER.       [facility] - GASTRO[facility]OLOGY  [facility], [place]     Dictation software program was used for this note. Please expect some simple typographical  errors in this note.    Encounter includes face to face time and non-face to face time preparing to see the patient (eg, review of tests), obtaining and/or reviewing separately obtained history, documenting clinical information in the electronic or other health record, independently interpreting results (not separately reported) and communicating results to the patient/family/caregiver, or care coordination (not separately reported).

## 2023-07-21 ENCOUNTER — OFFICE VISIT (OUTPATIENT)
Dept: GASTROENTEROLOGY | Facility: CLINIC | Age: 68
End: 2023-07-21
Payer: MEDICARE

## 2023-07-21 VITALS
HEIGHT: 63 IN | BODY MASS INDEX: 41.56 KG/M2 | WEIGHT: 234.56 LBS | SYSTOLIC BLOOD PRESSURE: 88 MMHG | HEART RATE: 72 BPM | DIASTOLIC BLOOD PRESSURE: 60 MMHG

## 2023-07-21 DIAGNOSIS — K44.9 HIATAL HERNIA: ICD-10-CM

## 2023-07-21 DIAGNOSIS — R14.0 BLOATING: ICD-10-CM

## 2023-07-21 DIAGNOSIS — R10.13 EPIGASTRIC PAIN: ICD-10-CM

## 2023-07-21 DIAGNOSIS — R93.2 ABNORMAL FINDINGS ON DIAGNOSTIC IMAGING OF LIVER AND BILIARY TRACT: ICD-10-CM

## 2023-07-21 DIAGNOSIS — Z87.19 HISTORY OF DIVERTICULOSIS: ICD-10-CM

## 2023-07-21 DIAGNOSIS — K83.8 DILATED BILE DUCT: ICD-10-CM

## 2023-07-21 DIAGNOSIS — R10.30 LOWER ABDOMINAL PAIN: Primary | ICD-10-CM

## 2023-07-21 DIAGNOSIS — K59.00 CONSTIPATION, UNSPECIFIED CONSTIPATION TYPE: ICD-10-CM

## 2023-07-21 DIAGNOSIS — R53.83 FATIGUE, UNSPECIFIED TYPE: ICD-10-CM

## 2023-07-21 DIAGNOSIS — Z86.2 HISTORY OF IRON DEFICIENCY ANEMIA: ICD-10-CM

## 2023-07-21 DIAGNOSIS — K76.0 FATTY INFILTRATION OF LIVER: ICD-10-CM

## 2023-07-21 DIAGNOSIS — R13.10 DYSPHAGIA, UNSPECIFIED TYPE: ICD-10-CM

## 2023-07-21 DIAGNOSIS — Z98.84 HISTORY OF GASTRIC BYPASS: ICD-10-CM

## 2023-07-21 PROCEDURE — 99999 PR PBB SHADOW E&M-EST. PATIENT-LVL V: ICD-10-PCS | Mod: PBBFAC,,,

## 2023-07-21 PROCEDURE — 99204 PR OFFICE/OUTPT VISIT, NEW, LEVL IV, 45-59 MIN: ICD-10-PCS | Mod: S$GLB,,,

## 2023-07-21 PROCEDURE — 99999 PR PBB SHADOW E&M-EST. PATIENT-LVL V: CPT | Mod: PBBFAC,,,

## 2023-07-21 PROCEDURE — 3074F PR MOST RECENT SYSTOLIC BLOOD PRESSURE < 130 MM HG: ICD-10-PCS | Mod: CPTII,S$GLB,,

## 2023-07-21 PROCEDURE — 1126F PR PAIN SEVERITY QUANTIFIED, NO PAIN PRESENT: ICD-10-PCS | Mod: CPTII,S$GLB,,

## 2023-07-21 PROCEDURE — 3078F DIAST BP <80 MM HG: CPT | Mod: CPTII,S$GLB,,

## 2023-07-21 PROCEDURE — 3288F PR FALLS RISK ASSESSMENT DOCUMENTED: ICD-10-PCS | Mod: CPTII,S$GLB,,

## 2023-07-21 PROCEDURE — 3074F SYST BP LT 130 MM HG: CPT | Mod: CPTII,S$GLB,,

## 2023-07-21 PROCEDURE — 1126F AMNT PAIN NOTED NONE PRSNT: CPT | Mod: CPTII,S$GLB,,

## 2023-07-21 PROCEDURE — 1160F PR REVIEW ALL MEDS BY PRESCRIBER/CLIN PHARMACIST DOCUMENTED: ICD-10-PCS | Mod: CPTII,S$GLB,,

## 2023-07-21 PROCEDURE — 3008F BODY MASS INDEX DOCD: CPT | Mod: CPTII,S$GLB,,

## 2023-07-21 PROCEDURE — 1101F PR PT FALLS ASSESS DOC 0-1 FALLS W/OUT INJ PAST YR: ICD-10-PCS | Mod: CPTII,S$GLB,,

## 2023-07-21 PROCEDURE — 3288F FALL RISK ASSESSMENT DOCD: CPT | Mod: CPTII,S$GLB,,

## 2023-07-21 PROCEDURE — 1159F MED LIST DOCD IN RCRD: CPT | Mod: CPTII,S$GLB,,

## 2023-07-21 PROCEDURE — 1101F PT FALLS ASSESS-DOCD LE1/YR: CPT | Mod: CPTII,S$GLB,,

## 2023-07-21 PROCEDURE — 3078F PR MOST RECENT DIASTOLIC BLOOD PRESSURE < 80 MM HG: ICD-10-PCS | Mod: CPTII,S$GLB,,

## 2023-07-21 PROCEDURE — 4010F ACE/ARB THERAPY RXD/TAKEN: CPT | Mod: CPTII,S$GLB,,

## 2023-07-21 PROCEDURE — 99204 OFFICE O/P NEW MOD 45 MIN: CPT | Mod: S$GLB,,,

## 2023-07-21 PROCEDURE — 1160F RVW MEDS BY RX/DR IN RCRD: CPT | Mod: CPTII,S$GLB,,

## 2023-07-21 PROCEDURE — 4010F PR ACE/ARB THEARPY RXD/TAKEN: ICD-10-PCS | Mod: CPTII,S$GLB,,

## 2023-07-21 PROCEDURE — 3008F PR BODY MASS INDEX (BMI) DOCUMENTED: ICD-10-PCS | Mod: CPTII,S$GLB,,

## 2023-07-21 PROCEDURE — 1159F PR MEDICATION LIST DOCUMENTED IN MEDICAL RECORD: ICD-10-PCS | Mod: CPTII,S$GLB,,

## 2023-07-21 RX ORDER — PANTOPRAZOLE SODIUM 40 MG/1
40 TABLET, DELAYED RELEASE ORAL DAILY
Qty: 30 TABLET | Refills: 11 | Status: SHIPPED | OUTPATIENT
Start: 2023-07-21 | End: 2023-07-21

## 2023-07-21 RX ORDER — PANTOPRAZOLE SODIUM 40 MG/1
40 TABLET, DELAYED RELEASE ORAL DAILY
Qty: 90 TABLET | Refills: 1 | Status: SHIPPED | OUTPATIENT
Start: 2023-07-21 | End: 2024-07-20

## 2023-07-28 ENCOUNTER — ANESTHESIA EVENT (OUTPATIENT)
Dept: ENDOSCOPY | Facility: HOSPITAL | Age: 68
End: 2023-07-28
Payer: MEDICARE

## 2023-07-28 ENCOUNTER — HOSPITAL ENCOUNTER (OUTPATIENT)
Facility: HOSPITAL | Age: 68
Discharge: HOME | End: 2023-07-28
Attending: INTERNAL MEDICINE | Admitting: INTERNAL MEDICINE
Payer: MEDICARE

## 2023-07-28 ENCOUNTER — ANESTHESIA (OUTPATIENT)
Dept: ENDOSCOPY | Facility: HOSPITAL | Age: 68
End: 2023-07-28
Payer: MEDICARE

## 2023-07-28 DIAGNOSIS — K57.90 DIVERTICULOSIS: ICD-10-CM

## 2023-07-28 DIAGNOSIS — K64.8 INTERNAL HEMORRHOIDS: ICD-10-CM

## 2023-07-28 DIAGNOSIS — R93.89 ABNORMAL CT SCAN: ICD-10-CM

## 2023-07-28 DIAGNOSIS — K63.5 POLYP OF COLON, UNSPECIFIED PART OF COLON, UNSPECIFIED TYPE: Primary | ICD-10-CM

## 2023-07-28 PROCEDURE — 37000009 HC ANESTHESIA EA ADD 15 MINS: Performed by: INTERNAL MEDICINE

## 2023-07-28 PROCEDURE — 88305 TISSUE EXAM BY PATHOLOGIST: CPT | Mod: TC | Performed by: PATHOLOGY

## 2023-07-28 PROCEDURE — D9220A PRA ANESTHESIA: ICD-10-PCS | Mod: CRNA,,, | Performed by: NURSE ANESTHETIST, CERTIFIED REGISTERED

## 2023-07-28 PROCEDURE — D9220A PRA ANESTHESIA: Mod: ANES,,, | Performed by: ANESTHESIOLOGY

## 2023-07-28 PROCEDURE — 25000003 PHARM REV CODE 250: Performed by: INTERNAL MEDICINE

## 2023-07-28 PROCEDURE — D9220A PRA ANESTHESIA: ICD-10-PCS | Mod: ANES,,, | Performed by: ANESTHESIOLOGY

## 2023-07-28 PROCEDURE — D9220A PRA ANESTHESIA: Mod: CRNA,,, | Performed by: NURSE ANESTHETIST, CERTIFIED REGISTERED

## 2023-07-28 PROCEDURE — 63600175 PHARM REV CODE 636 W HCPCS: Performed by: NURSE ANESTHETIST, CERTIFIED REGISTERED

## 2023-07-28 PROCEDURE — 25000003 PHARM REV CODE 250: Performed by: NURSE ANESTHETIST, CERTIFIED REGISTERED

## 2023-07-28 PROCEDURE — 27201012 HC FORCEPS, HOT/COLD, DISP: Performed by: INTERNAL MEDICINE

## 2023-07-28 PROCEDURE — 45380 COLONOSCOPY AND BIOPSY: CPT | Mod: PT | Performed by: INTERNAL MEDICINE

## 2023-07-28 PROCEDURE — 45380 COLONOSCOPY AND BIOPSY: CPT | Mod: ,,, | Performed by: INTERNAL MEDICINE

## 2023-07-28 PROCEDURE — 45380 PR COLONOSCOPY,BIOPSY: ICD-10-PCS | Mod: ,,, | Performed by: INTERNAL MEDICINE

## 2023-07-28 PROCEDURE — 37000008 HC ANESTHESIA 1ST 15 MINUTES: Performed by: INTERNAL MEDICINE

## 2023-07-28 RX ORDER — LIDOCAINE HYDROCHLORIDE 20 MG/ML
INJECTION INTRAVENOUS
Status: DISCONTINUED | OUTPATIENT
Start: 2023-07-28 | End: 2023-07-28

## 2023-07-28 RX ORDER — PROPOFOL 10 MG/ML
VIAL (ML) INTRAVENOUS
Status: DISCONTINUED | OUTPATIENT
Start: 2023-07-28 | End: 2023-07-28

## 2023-07-28 RX ORDER — SODIUM CHLORIDE 9 MG/ML
INJECTION, SOLUTION INTRAVENOUS CONTINUOUS
Status: DISCONTINUED | OUTPATIENT
Start: 2023-07-28 | End: 2023-07-28 | Stop reason: HOSPADM

## 2023-07-28 RX ADMIN — SODIUM CHLORIDE: 9 INJECTION, SOLUTION INTRAVENOUS at 09:07

## 2023-07-28 RX ADMIN — LIDOCAINE HYDROCHLORIDE 50 MG: 20 INJECTION, SOLUTION INTRAVENOUS at 10:07

## 2023-07-28 RX ADMIN — PROPOFOL 50 MG: 10 INJECTION, EMULSION INTRAVENOUS at 10:07

## 2023-07-28 RX ADMIN — PROPOFOL 100 MG: 10 INJECTION, EMULSION INTRAVENOUS at 10:07

## 2023-07-28 NOTE — TRANSFER OF CARE
"Anesthesia Transfer of Care Note    Patient: Megha Robb    Procedure(s) Performed: Procedure(s) (LRB):  COLONOSCOPY (N/A)    Patient location: GI    Anesthesia Type: general    Transport from OR: Transported from OR on room air with adequate spontaneous ventilation    Post pain: adequate analgesia    Post assessment: no apparent anesthetic complications    Post vital signs: stable    Level of consciousness: awake    Nausea/Vomiting: no nausea/vomiting    Complications: none    Transfer of care protocol was followed      Last vitals:   Visit Vitals  BP (!) 143/65 (BP Location: Left arm, Patient Position: Lying)   Pulse 72   Temp 36.5 °C (97.7 °F) (Skin)   Resp 18   Ht 5' 3" (1.6 m)   Wt 106.1 kg (234 lb)   SpO2 100%   Breastfeeding No   BMI 41.45 kg/m²     "

## 2023-07-28 NOTE — PROVATION PATIENT INSTRUCTIONS
Discharge Summary/Instructions after an Endoscopic Procedure  Patient Name: Megha Robb  Patient MRN: [id number]  Patient YOB: 1955 Friday, July 28, 2023  Bryson Boss MD  Dear patient,  As a result of recent federal legislation (The Federal Cures Act), you may   receive lab or pathology results from your procedure in your [facility]   account before your physician is able to contact you. Your physician or   their representative will relay the results to you with their   recommendations at their soonest availability.  Thank you,  RESTRICTIONS:  During your procedure today, you received medications for sedation.  These   medications may affect your judgment, balance and coordination.  Therefore,   for 24 hours, you have the following restrictions:   - DO NOT drive a car, operate machinery, make legal/financial decisions,   sign important papers or drink alcohol.    ACTIVITY:  Today: no heavy lifting, straining or running due to procedural   sedation/anesthesia.  The following day: return to full activity including work.  DIET:  Eat and drink normally unless instructed otherwise.     TREATMENT FOR COMMON SIDE EFFECTS:  - Mild abdominal pain, nausea, belching, bloating or excessive gas:  rest,   eat lightly and use a heating pad.  - Sore Throat: treat with throat lozenges and/or gargle with warm salt   water.  - Because air was used during the procedure, expelling large amounts of air   from your rectum or belching is normal.  - If a bowel prep was taken, you may not have a bowel movement for 1-3 days.    This is normal.  SYMPTOMS TO WATCH FOR AND REPORT TO YOUR PHYSICIAN:  1. Abdominal pain or bloating, other than gas cramps.  2. Chest pain.  3. Back pain.  4. Signs of infection such as: chills or fever occurring within 24 hours   after the procedure.  5. Rectal bleeding, which would show as bright red, maroon, or black stools.   (A tablespoon of blood from the rectum is not serious, especially if    hemorrhoids are present.)  6. Vomiting.  7. Weakness or dizziness.  GO DIRECTLY TO THE NEAREST EMERGENCY ROOM IF YOU HAVE ANY OF THE FOLLOWING:      Difficulty breathing              Chills and/or fever over 101 F   Persistent vomiting and/or vomiting blood   Severe abdominal pain   Severe chest pain   Black, tarry stools   Bleeding- more than one tablespoon   Any other symptom or condition that you feel may need urgent attention  Your doctor recommends these additional instructions:  If any biopsies were taken, your doctors clinic will contact you in 1 to 2   weeks with any results.  - Patient has a contact number available for emergencies.  The signs and   symptoms of potential delayed complications were discussed with the   patient.  Return to normal activities tomorrow.  Written discharge   instructions were provided to the patient.   - High fiber diet.   - Continue present medications.   - Await pathology results.   - Repeat colonoscopy in 5 years for surveillance.   - Discharge patient to home (ambulatory).   - Return to GI office after studies are complete.  For questions, problems or results please call your physician - Bryson Boss MD at Work:  [phone].  [facility] EMERGENCY ROOM PHONE NUMBER: [phone]  IF A COMPLICATION OR EMERGENCY SITUATION ARISES AND YOU ARE UNABLE TO REACH   YOUR PHYSICIAN - GO DIRECTLY TO THE EMERGENCY ROOM.  Bryson Boss MD  7/28/2023 10:41:47 AM  This report has been verified and signed electronically.  Dear patient,  As a result of recent federal legislation (The Federal Cures Act), you may   receive lab or pathology results from your procedure in your [facility]   account before your physician is able to contact you. Your physician or   their representative will relay the results to you with their   recommendations at their soonest availability.  Thank you,  [employer]OVATION

## 2023-07-28 NOTE — ANESTHESIA PREPROCEDURE EVALUATION
07/28/2023  Megha Robb is a 68 y.o., female.      Pre-op Assessment    I have reviewed the Patient Summary Reports.     I have reviewed the Nursing Notes.       Review of Systems  Anesthesia Hx:  No problems with previous Anesthesia    Cardiovascular:   Hypertension    Musculoskeletal:   Arthritis     Neurological:   Neuromuscular Disease,        Physical Exam  General: Well nourished        Anesthesia Plan  Type of Anesthesia, risks & benefits discussed:    Anesthesia Type: Gen Natural Airway  Intra-op Monitoring Plan: Standard ASA Monitors  Induction:  IV  Informed Consent: Informed consent signed with the Patient and all parties understand the risks and agree with anesthesia plan.  All questions answered.   ASA Score: 3  Day of Surgery Review of History & Physical: H&P Update referred to the surgeon/provider.    Ready For Surgery From Anesthesia Perspective.     .

## 2023-07-31 VITALS
HEART RATE: 62 BPM | WEIGHT: 234 LBS | DIASTOLIC BLOOD PRESSURE: 71 MMHG | SYSTOLIC BLOOD PRESSURE: 135 MMHG | OXYGEN SATURATION: 99 % | HEIGHT: 63 IN | RESPIRATION RATE: 18 BRPM | TEMPERATURE: 98 F | BODY MASS INDEX: 41.46 KG/M2

## 2023-07-31 NOTE — ANESTHESIA POSTPROCEDURE EVALUATION
Anesthesia Post Evaluation    Patient: Megha Robb    Procedure(s) Performed: Procedure(s) (LRB):  COLONOSCOPY (N/A)    Final Anesthesia Type: general      Patient location during evaluation: PACU  Patient participation: Yes- Able to Participate  Level of consciousness: awake and alert  Post-procedure vital signs: reviewed and stable  Pain management: adequate  Airway patency: patent    PONV status at discharge: No PONV  Anesthetic complications: no      Cardiovascular status: blood pressure returned to baseline  Respiratory status: unassisted and room air  Hydration status: euvolemic  Follow-up not needed.          Vitals Value Taken Time   /71 07/28/23 1111   Temp 36.6 °C (97.9 °F) 07/28/23 1045   Pulse 64 07/28/23 1115   Resp 18 07/28/23 1106   SpO2 99 % 07/28/23 1115   Vitals shown include unvalidated device data.      Event Time   Out of Recovery 11:23:33         Pain/Juan Score: No data recorded

## 2023-07-31 NOTE — PROGRESS NOTES
Please advise patient that polyp pathology was reviewed and is benign and is the nonadenomatous type which is not precancerous/risk factor for cancer.  Repeat colonoscopy recommended in 10 years, not 5 as previously thought.  Place reminder in system for repeat colonoscopy.

## 2023-08-02 ENCOUNTER — HOSPITAL ENCOUNTER (OUTPATIENT)
Dept: RADIOLOGY | Facility: HOSPITAL | Age: 68
Discharge: HOME | End: 2023-08-02
Payer: MEDICARE

## 2023-08-02 DIAGNOSIS — R93.2 ABNORMAL FINDINGS ON DIAGNOSTIC IMAGING OF LIVER AND BILIARY TRACT: ICD-10-CM

## 2023-08-02 DIAGNOSIS — K83.8 DILATED BILE DUCT: ICD-10-CM

## 2023-08-02 PROCEDURE — 76376 3D RENDER W/INTRP POSTPROCES: CPT | Mod: 26,,, | Performed by: RADIOLOGY

## 2023-08-02 PROCEDURE — 74181 MRI ABDOMEN WITHOUT CONTRAST MRCP: ICD-10-PCS | Mod: 26,,, | Performed by: RADIOLOGY

## 2023-08-02 PROCEDURE — 76376 MRI ABDOMEN WITHOUT CONTRAST MRCP: ICD-10-PCS | Mod: 26,,, | Performed by: RADIOLOGY

## 2023-08-02 PROCEDURE — 74181 MRI ABDOMEN W/O CONTRAST: CPT | Mod: 26,,, | Performed by: RADIOLOGY

## 2023-08-02 PROCEDURE — 74181 MRI ABDOMEN W/O CONTRAST: CPT | Mod: TC

## 2023-08-02 PROCEDURE — 76376 3D RENDER W/INTRP POSTPROCES: CPT | Mod: TC

## 2023-08-15 ENCOUNTER — TELEPHONE (OUTPATIENT)
Dept: GASTROENTEROLOGY | Facility: CLINIC | Age: 68
End: 2023-08-15
Payer: MEDICARE

## 2023-08-15 NOTE — TELEPHONE ENCOUNTER
----- Message from Prema Parikh sent at 8/15/2023  1:29 PM CDT -----  Contact: reuben -daughter in law  Type: Needs Medical Advice  Who Called:  reuben daughter in law    Best Call Back Number: [phone]  Additional Information: has questions regarding pt's mri results please call.pt is in a lot of pain and would like to discuss

## 2023-08-15 NOTE — TELEPHONE ENCOUNTER
Received message that pt's daughter in law, Lakisha Banks requested a call back due to pt in pain. Noted that pt has EGD scheduled on 8/18/23 at 2:30 pm. F/up with Lakisha (on contact list); however, she states pt obtained an earlier EGD on 8/18 and she no longer needs any assistance.

## 2023-08-15 NOTE — TELEPHONE ENCOUNTER
Patient returned call can she wants to move up EGD to 8/18 arrive for 130pm instructions reviewed and patient states understanding

## 2023-08-15 NOTE — TELEPHONE ENCOUNTER
Called patient with colonoscopy pathology results and patient states her stomach is killing her and she has been to the ER twice offered to move up EGD to 8/18 at 230pm arrive for 130pm she will check with ride and call me back contact info provided.

## 2023-08-18 ENCOUNTER — ANESTHESIA EVENT (OUTPATIENT)
Dept: ENDOSCOPY | Facility: HOSPITAL | Age: 68
End: 2023-08-18
Payer: MEDICARE

## 2023-08-18 ENCOUNTER — HOSPITAL ENCOUNTER (OUTPATIENT)
Facility: HOSPITAL | Age: 68
Discharge: HOME | End: 2023-08-18
Attending: INTERNAL MEDICINE | Admitting: INTERNAL MEDICINE
Payer: MEDICARE

## 2023-08-18 ENCOUNTER — ANESTHESIA (OUTPATIENT)
Dept: ENDOSCOPY | Facility: HOSPITAL | Age: 68
End: 2023-08-18
Payer: MEDICARE

## 2023-08-18 DIAGNOSIS — R10.13 EPIGASTRIC PAIN: ICD-10-CM

## 2023-08-18 DIAGNOSIS — K28.9 ANASTOMOTIC ULCER: Primary | ICD-10-CM

## 2023-08-18 DIAGNOSIS — K22.2 SCHATZKI'S RING: ICD-10-CM

## 2023-08-18 PROCEDURE — 25000003 PHARM REV CODE 250: Performed by: INTERNAL MEDICINE

## 2023-08-18 PROCEDURE — 27201012 HC FORCEPS, HOT/COLD, DISP: Performed by: INTERNAL MEDICINE

## 2023-08-18 PROCEDURE — 43248 EGD GUIDE WIRE INSERTION: CPT | Mod: ,,, | Performed by: INTERNAL MEDICINE

## 2023-08-18 PROCEDURE — 88305 TISSUE EXAM BY PATHOLOGIST: CPT | Mod: TC | Performed by: PATHOLOGY

## 2023-08-18 PROCEDURE — 25000003 PHARM REV CODE 250: Performed by: NURSE ANESTHETIST, CERTIFIED REGISTERED

## 2023-08-18 PROCEDURE — D9220A PRA ANESTHESIA: Mod: ANES,,, | Performed by: ANESTHESIOLOGY

## 2023-08-18 PROCEDURE — 43239 EGD BIOPSY SINGLE/MULTIPLE: CPT | Mod: 59 | Performed by: INTERNAL MEDICINE

## 2023-08-18 PROCEDURE — D9220A PRA ANESTHESIA: Mod: CRNA,,, | Performed by: NURSE ANESTHETIST, CERTIFIED REGISTERED

## 2023-08-18 PROCEDURE — D9220A PRA ANESTHESIA: ICD-10-PCS | Mod: ANES,,, | Performed by: ANESTHESIOLOGY

## 2023-08-18 PROCEDURE — 43239 EGD BIOPSY SINGLE/MULTIPLE: CPT | Mod: 59,,, | Performed by: INTERNAL MEDICINE

## 2023-08-18 PROCEDURE — 43248 EGD GUIDE WIRE INSERTION: CPT | Performed by: INTERNAL MEDICINE

## 2023-08-18 PROCEDURE — 43239 PR EGD, FLEX, W/BIOPSY, SGL/MULTI: ICD-10-PCS | Mod: 59,,, | Performed by: INTERNAL MEDICINE

## 2023-08-18 PROCEDURE — 43248 PR EGD, FLEX, W/DILATION OVER GUIDEWIRE: ICD-10-PCS | Mod: ,,, | Performed by: INTERNAL MEDICINE

## 2023-08-18 PROCEDURE — D9220A PRA ANESTHESIA: ICD-10-PCS | Mod: CRNA,,, | Performed by: NURSE ANESTHETIST, CERTIFIED REGISTERED

## 2023-08-18 PROCEDURE — 37000008 HC ANESTHESIA 1ST 15 MINUTES: Performed by: INTERNAL MEDICINE

## 2023-08-18 PROCEDURE — 63600175 PHARM REV CODE 636 W HCPCS: Performed by: NURSE ANESTHETIST, CERTIFIED REGISTERED

## 2023-08-18 RX ORDER — SODIUM CHLORIDE 9 MG/ML
INJECTION, SOLUTION INTRAVENOUS CONTINUOUS
Status: DISCONTINUED | OUTPATIENT
Start: 2023-08-18 | End: 2023-08-18 | Stop reason: HOSPADM

## 2023-08-18 RX ORDER — LIDOCAINE HYDROCHLORIDE 20 MG/ML
INJECTION INTRAVENOUS
Status: DISCONTINUED | OUTPATIENT
Start: 2023-08-18 | End: 2023-08-18

## 2023-08-18 RX ORDER — PROPOFOL 10 MG/ML
VIAL (ML) INTRAVENOUS
Status: DISCONTINUED | OUTPATIENT
Start: 2023-08-18 | End: 2023-08-18

## 2023-08-18 RX ORDER — SUCRALFATE 1 G/1
1 TABLET ORAL
Qty: 360 TABLET | Refills: 0 | Status: SHIPPED | OUTPATIENT
Start: 2023-08-18 | End: 2023-11-09 | Stop reason: SDUPTHER

## 2023-08-18 RX ADMIN — PROPOFOL 50 MG: 10 INJECTION, EMULSION INTRAVENOUS at 11:08

## 2023-08-18 RX ADMIN — SODIUM CHLORIDE: 9 INJECTION, SOLUTION INTRAVENOUS at 10:08

## 2023-08-18 RX ADMIN — PROPOFOL 100 MG: 10 INJECTION, EMULSION INTRAVENOUS at 11:08

## 2023-08-18 RX ADMIN — LIDOCAINE HYDROCHLORIDE 100 MG: 20 INJECTION, SOLUTION INTRAVENOUS at 11:08

## 2023-08-18 NOTE — H&P
CC: GERD, dysphagia    68 year old female with above. States that symptoms are stable, no alleviating/exacerbating factors. No bleeding or weight loss.     ROS:  No headache, no fever/chills, no chest pain/SOB, no nausea/vomiting/diarrhea/constipation/GI bleeding/abdominal pain, no dysuria/hematuria.    VSSAF   Exam:   Alert and oriented x 3; no apparent distress   PERRLA, sclera anicteric  CV: Regular rate/rhythm, normal PMI   Lungs: Clear bilaterally with no wheeze/rales   Abdomen: Soft, NT/ND, normal bowel sounds   Ext: No cyanosis, clubbing     Impression:   As above    Plan:   Proceed with endoscopy. Further recs to follow.     
no

## 2023-08-18 NOTE — ANESTHESIA PREPROCEDURE EVALUATION
08/18/2023  Megha Robb is a 68 y.o., female.      Pre-op Assessment    I have reviewed the Patient Summary Reports.     I have reviewed the Nursing Notes. I have reviewed the NPO Status.   I have reviewed the Medications.     Review of Systems  Anesthesia Hx:  No problems with previous Anesthesia    Social:  Non-Smoker    Hematology/Oncology:         -- Cancer in past history:   Cardiovascular:   Hypertension    Hepatic/GI:   Epigastric pain    Musculoskeletal:   Arthritis     Neurological:   Neuromuscular Disease,    Endocrine:  Morbid Obesity / BMI > 40      Physical Exam  General: Well nourished, Cooperative, Alert and Oriented    Airway:  Mallampati: II   Mouth Opening: Normal  TM Distance: Normal  Tongue: Normal  Neck ROM: Normal ROM    Dental:  Intact    Chest/Lungs:  Normal Respiratory Rate    Heart:  Rate: Normal        Anesthesia Plan  Type of Anesthesia, risks & benefits discussed:    Anesthesia Type: Gen Natural Airway  Intra-op Monitoring Plan: Standard ASA Monitors  Induction:  IV  Informed Consent: Informed consent signed with the Patient and all parties understand the risks and agree with anesthesia plan.  All questions answered.   ASA Score: 3    Ready For Surgery From Anesthesia Perspective.     .

## 2023-08-18 NOTE — ANESTHESIA POSTPROCEDURE EVALUATION
Anesthesia Post Evaluation    Patient: Megha Robb    Procedure(s) Performed: Procedure(s) (LRB):  EGD (ESOPHAGOGASTRODUODENOSCOPY) (N/A)    Final Anesthesia Type: general      Patient location during evaluation: PACU  Patient participation: Yes- Able to Participate  Level of consciousness: awake and alert  Post-procedure vital signs: reviewed and stable  Pain management: adequate  Airway patency: patent    PONV status at discharge: No PONV  Anesthetic complications: no      Cardiovascular status: hemodynamically stable  Respiratory status: unassisted and room air  Hydration status: euvolemic  Follow-up not needed.          Vitals Value Taken Time   /59 08/18/23 1230   Temp 36.3 °C (97.3 °F) 08/18/23 1148   Pulse 56 08/18/23 1230   Resp 16 08/18/23 1230   SpO2 100 % 08/18/23 1230         No case tracking events are documented in the log.      Pain/Juan Score: Juan Score: 10 (8/18/2023 12:30 PM)

## 2023-08-18 NOTE — TRANSFER OF CARE
Anesthesia Transfer of Care Note    Patient: Megha Robb    Procedure(s) Performed: Procedure(s) (LRB):  EGD (ESOPHAGOGASTRODUODENOSCOPY) (N/A)    Patient location: GI    Anesthesia Type: general    Transport from OR: Transported from OR on room air with adequate spontaneous ventilation    Post pain: adequate analgesia    Post assessment: no apparent anesthetic complications    Post vital signs: stable    Level of consciousness: sedated    Nausea/Vomiting: no nausea/vomiting    Complications: none    Transfer of care protocol was followed      Last vitals:   Visit Vitals  Breastfeeding No

## 2023-08-18 NOTE — [employer]OVATION PATIENT INSTRUCTIONS
Discharge Summary/Instructions after an Endoscopic Procedure  Patient Name: Megha Robb  Patient MRN: [id number]  Patient YOB: 1955 Friday, August 18, 2023  Bryson Boss MD  Dear patient,  As a result of recent federal legislation (The Federal Cures Act), you may   receive lab or pathology results from your procedure in your [facility]   account before your physician is able to contact you. Your physician or   their representative will relay the results to you with their   recommendations at their soonest availability.  Thank you,  RESTRICTIONS:  During your procedure today, you received medications for sedation.  These   medications may affect your judgment, balance and coordination.  Therefore,   for 24 hours, you have the following restrictions:   - DO NOT drive a car, operate machinery, make legal/financial decisions,   sign important papers or drink alcohol.    ACTIVITY:  Today: no heavy lifting, straining or running due to procedural   sedation/anesthesia.  The following day: return to full activity including work.  DIET:  Eat and drink normally unless instructed otherwise.     TREATMENT FOR COMMON SIDE EFFECTS:  - Mild abdominal pain, nausea, belching, bloating or excessive gas:  rest,   eat lightly and use a heating pad.  - Sore Throat: treat with throat lozenges and/or gargle with warm salt   water.  - Because air was used during the procedure, expelling large amounts of air   from your rectum or belching is normal.  - If a bowel prep was taken, you may not have a bowel movement for 1-3 days.    This is normal.  SYMPTOMS TO WATCH FOR AND REPORT TO YOUR PHYSICIAN:  1. Abdominal pain or bloating, other than gas cramps.  2. Chest pain.  3. Back pain.  4. Signs of infection such as: chills or fever occurring within 24 hours   after the procedure.  5. Rectal bleeding, which would show as bright red, maroon, or black stools.   (A tablespoon of blood from the rectum is not serious, especially  if   hemorrhoids are present.)  6. Vomiting.  7. Weakness or dizziness.  GO DIRECTLY TO THE NEAREST EMERGENCY ROOM IF YOU HAVE ANY OF THE FOLLOWING:      Difficulty breathing              Chills and/or fever over 101 F   Persistent vomiting and/or vomiting blood   Severe abdominal pain   Severe chest pain   Black, tarry stools   Bleeding- more than one tablespoon   Any other symptom or condition that you feel may need urgent attention  Your doctor recommends these additional instructions:  If any biopsies were taken, your doctors clinic will contact you in 1 to 2   weeks with any results.  - Patient has a contact number available for emergencies.  The signs and   symptoms of potential delayed complications were discussed with the   patient.  Return to normal activities tomorrow.  Written discharge   instructions were provided to the patient.   - Resume previous diet.   - Continue present medications.   - No aspirin, ibuprofen, naproxen, or other non-steroidal anti-inflammatory   drugs.   - Await pathology results.   - Discharge patient to home (ambulatory).   - Follow an antireflux regimen.   - Use sucralfate tablets 1 gram PO QID.   - Return to GI office after studies are complete.  For questions, problems or results please call your physician - Bryson Boss MD at Work:  [phone].  [facility], EMERGENCY ROOM PHONE NUMBER: [phone]  IF A COMPLICATION OR EMERGENCY SITUATION ARISES AND YOU ARE UNABLE TO REACH   YOUR PHYSICIAN - GO DIRECTLY TO THE EMERGENCY ROOM.  Bryson Boss MD  8/18/2023 11:38:27 AM  This report has been verified and signed electronically.  Dear patient,  As a result of recent federal legislation (The Federal Cures Act), you may   receive lab or pathology results from your procedure in your [facility]   account before your physician is able to contact you. Your physician or   their representative will relay the results to you with their   recommendations at their soonest  availability.  Thank you,  [employer]ATION

## 2023-08-18 NOTE — PLAN OF CARE
Vss, art po fluids, denies pain, ambulates easily. IV removed, catheter intact. Discharge instructions provided and states understanding. States ready to go home.  Discharged from facility with family per wheelchair.

## 2023-08-21 VITALS
RESPIRATION RATE: 16 BRPM | DIASTOLIC BLOOD PRESSURE: 59 MMHG | TEMPERATURE: 97 F | SYSTOLIC BLOOD PRESSURE: 111 MMHG | HEART RATE: 56 BPM | OXYGEN SATURATION: 100 %

## 2023-09-11 ENCOUNTER — TELEPHONE (OUTPATIENT)
Dept: GASTROENTEROLOGY | Facility: CLINIC | Age: 68
End: 2023-09-11
Payer: MEDICARE

## 2023-09-11 DIAGNOSIS — L02.211 ABDOMINAL WALL ABSCESS: Primary | ICD-10-CM

## 2023-09-11 NOTE — TELEPHONE ENCOUNTER
On call with pt's caregiver when SHAAN Blevins states she had consulted with Dr. Mendez. Per Felicity, she had ordered a referral to General Surgery for this pt; however, if pt wants to come in for an ov to discuss GI symptoms or her scopes; she may make an appt. Resumed on hold call with pt's caregiver, Lakisha. Lakisha was told about the referral for General Surgery and she totally agreed and states no GI appt is needed at this point. Informed Lakisha that Felicity was hand delivering referral to General Surgeon's nurse. Pt's caregiver, Lakisha verbalized understanding and vebalized her gratitude.

## 2023-09-11 NOTE — TELEPHONE ENCOUNTER
----- Message from Awais Aviles sent at 9/11/2023  8:44 AM CDT -----  Type:  Needs Medical Advice    Who Called: Daughter in law- Lakisha  Symptoms (please be specific): states the patient is still in a tremendous amount of pain   How long has patient had these symptoms:  months  Would the patient rather a call back or a response via [facility]? call  Best Call Back Number:   Additional Information:

## 2023-09-11 NOTE — TELEPHONE ENCOUNTER
"Followed up with pt; she states she is experiencing "shooting pain" in pelvic area (in area where abscess was treated at [facility] per pt). On a scale of 1 to 10, she states pain level is 9. Pt denies having drainage, redness, swelling. Pt asked me to speak with her caregiver, Lakisha who states going to the ER would be "a waste of time." She states pt has had multiple diagnostic procedures done and is still experiencing pain. Please advise.   "

## 2023-09-11 NOTE — TELEPHONE ENCOUNTER
----- Message from Felicity Chin NP sent at 9/11/2023  3:33 PM CDT -----  Please let the patient know for the abdominal wall abscess patient needs to be seeing General surgery I will put in a referral for her to see General surgery for management of her abdominal wall abscess.  Patient should be reached out by General surgery to schedule an appointment.  Please let the patient know to please come in if she has any further questions with the scopes completed and if she is having any GI symptoms.    -WALTER Blevins

## 2023-09-18 ENCOUNTER — OFFICE VISIT (OUTPATIENT)
Dept: SURGERY | Facility: CLINIC | Age: 68
End: 2023-09-18
Payer: MEDICARE

## 2023-09-18 VITALS
HEIGHT: 63 IN | TEMPERATURE: 97 F | BODY MASS INDEX: 43.32 KG/M2 | SYSTOLIC BLOOD PRESSURE: 89 MMHG | DIASTOLIC BLOOD PRESSURE: 50 MMHG | WEIGHT: 244.5 LBS | HEART RATE: 73 BPM

## 2023-09-18 DIAGNOSIS — L02.211 ABDOMINAL WALL ABSCESS: Primary | ICD-10-CM

## 2023-09-18 DIAGNOSIS — K44.9 HIATAL HERNIA: ICD-10-CM

## 2023-09-18 PROCEDURE — 1101F PT FALLS ASSESS-DOCD LE1/YR: CPT | Mod: CPTII,S$GLB,, | Performed by: STUDENT IN AN ORGANIZED HEALTH CARE EDUCATION/TRAINING PROGRAM

## 2023-09-18 PROCEDURE — 3078F DIAST BP <80 MM HG: CPT | Mod: CPTII,S$GLB,, | Performed by: STUDENT IN AN ORGANIZED HEALTH CARE EDUCATION/TRAINING PROGRAM

## 2023-09-18 PROCEDURE — 1159F MED LIST DOCD IN RCRD: CPT | Mod: CPTII,S$GLB,, | Performed by: STUDENT IN AN ORGANIZED HEALTH CARE EDUCATION/TRAINING PROGRAM

## 2023-09-18 PROCEDURE — 99204 OFFICE O/P NEW MOD 45 MIN: CPT | Mod: S$GLB,,, | Performed by: STUDENT IN AN ORGANIZED HEALTH CARE EDUCATION/TRAINING PROGRAM

## 2023-09-18 PROCEDURE — 1159F PR MEDICATION LIST DOCUMENTED IN MEDICAL RECORD: ICD-10-PCS | Mod: CPTII,S$GLB,, | Performed by: STUDENT IN AN ORGANIZED HEALTH CARE EDUCATION/TRAINING PROGRAM

## 2023-09-18 PROCEDURE — 3074F PR MOST RECENT SYSTOLIC BLOOD PRESSURE < 130 MM HG: ICD-10-PCS | Mod: CPTII,S$GLB,, | Performed by: STUDENT IN AN ORGANIZED HEALTH CARE EDUCATION/TRAINING PROGRAM

## 2023-09-18 PROCEDURE — 3078F PR MOST RECENT DIASTOLIC BLOOD PRESSURE < 80 MM HG: ICD-10-PCS | Mod: CPTII,S$GLB,, | Performed by: STUDENT IN AN ORGANIZED HEALTH CARE EDUCATION/TRAINING PROGRAM

## 2023-09-18 PROCEDURE — 3074F SYST BP LT 130 MM HG: CPT | Mod: CPTII,S$GLB,, | Performed by: STUDENT IN AN ORGANIZED HEALTH CARE EDUCATION/TRAINING PROGRAM

## 2023-09-18 PROCEDURE — 99999 PR PBB SHADOW E&M-EST. PATIENT-LVL IV: CPT | Mod: PBBFAC,,, | Performed by: STUDENT IN AN ORGANIZED HEALTH CARE EDUCATION/TRAINING PROGRAM

## 2023-09-18 PROCEDURE — 1125F PR PAIN SEVERITY QUANTIFIED, PAIN PRESENT: ICD-10-PCS | Mod: CPTII,S$GLB,, | Performed by: STUDENT IN AN ORGANIZED HEALTH CARE EDUCATION/TRAINING PROGRAM

## 2023-09-18 PROCEDURE — 4010F ACE/ARB THERAPY RXD/TAKEN: CPT | Mod: CPTII,S$GLB,, | Performed by: STUDENT IN AN ORGANIZED HEALTH CARE EDUCATION/TRAINING PROGRAM

## 2023-09-18 PROCEDURE — 3288F FALL RISK ASSESSMENT DOCD: CPT | Mod: CPTII,S$GLB,, | Performed by: STUDENT IN AN ORGANIZED HEALTH CARE EDUCATION/TRAINING PROGRAM

## 2023-09-18 PROCEDURE — 4010F PR ACE/ARB THEARPY RXD/TAKEN: ICD-10-PCS | Mod: CPTII,S$GLB,, | Performed by: STUDENT IN AN ORGANIZED HEALTH CARE EDUCATION/TRAINING PROGRAM

## 2023-09-18 PROCEDURE — 99999 PR PBB SHADOW E&M-EST. PATIENT-LVL IV: ICD-10-PCS | Mod: PBBFAC,,, | Performed by: STUDENT IN AN ORGANIZED HEALTH CARE EDUCATION/TRAINING PROGRAM

## 2023-09-18 PROCEDURE — 1125F AMNT PAIN NOTED PAIN PRSNT: CPT | Mod: CPTII,S$GLB,, | Performed by: STUDENT IN AN ORGANIZED HEALTH CARE EDUCATION/TRAINING PROGRAM

## 2023-09-18 PROCEDURE — 3008F BODY MASS INDEX DOCD: CPT | Mod: CPTII,S$GLB,, | Performed by: STUDENT IN AN ORGANIZED HEALTH CARE EDUCATION/TRAINING PROGRAM

## 2023-09-18 PROCEDURE — 99204 PR OFFICE/OUTPT VISIT, NEW, LEVL IV, 45-59 MIN: ICD-10-PCS | Mod: S$GLB,,, | Performed by: STUDENT IN AN ORGANIZED HEALTH CARE EDUCATION/TRAINING PROGRAM

## 2023-09-18 PROCEDURE — 1101F PR PT FALLS ASSESS DOC 0-1 FALLS W/OUT INJ PAST YR: ICD-10-PCS | Mod: CPTII,S$GLB,, | Performed by: STUDENT IN AN ORGANIZED HEALTH CARE EDUCATION/TRAINING PROGRAM

## 2023-09-18 PROCEDURE — 3008F PR BODY MASS INDEX (BMI) DOCUMENTED: ICD-10-PCS | Mod: CPTII,S$GLB,, | Performed by: STUDENT IN AN ORGANIZED HEALTH CARE EDUCATION/TRAINING PROGRAM

## 2023-09-18 PROCEDURE — 3288F PR FALLS RISK ASSESSMENT DOCUMENTED: ICD-10-PCS | Mod: CPTII,S$GLB,, | Performed by: STUDENT IN AN ORGANIZED HEALTH CARE EDUCATION/TRAINING PROGRAM

## 2023-09-18 RX ORDER — DICLOFENAC SODIUM 1 MG/ML
1 SOLUTION/ DROPS OPHTHALMIC 4 TIMES DAILY
COMMUNITY
End: 2024-03-25

## 2023-09-18 NOTE — PROGRESS NOTES
"History & Physical    SUBJECTIVE:     History of Present Illness:  Patient is a 68 y.o. female presents with abdominal pain.  Patient has a history of "stomach stapling" surgery.  She also recently had an abscess of her lower abdomen.  She was referred to me for possible abscess.    Chief Complaint   Patient presents with    Consult     Abdominal wall abscess       Review of patient's allergies indicates:   Allergen Reactions    Bee sting [allergen ext-venom-honey bee] Shortness Of Breath    Pcn [penicillins] Hives    Vancomycin analogues Rash       Current Outpatient Medications   Medication Sig Dispense Refill    diclofenac (VOLTAREN) 0.1 % ophthalmic solution 1 drop 4 (four) times daily.      ferrous sulfate 325 (65 FE) MG EC tablet Take 325 mg by mouth once daily.       FLUVIRIN [id number] 45 mcg (15 mcg x 3)/0.5 mL Susp   0    hydrocodone-acetaminophen (LORCET)  mg per tablet Take 1 tablet by mouth every 6 (six) hours as needed.      multivitamin (THERAGRAN) per tablet Take 1 tablet by mouth once daily.        pantoprazole (PROTONIX) 40 MG tablet Take 1 tablet (40 mg total) by mouth once daily. 90 tablet 1    pregabalin (LYRICA) 50 MG capsule Take 50 mg by mouth 3 (three) times daily.      quinapril (ACCUPRIL) 5 MG tablet Take 40 mg by mouth every evening. Patient unsure of dosage      sucralfate (CARAFATE) 1 gram tablet Take 1 tablet (1 g total) by mouth 4 (four) times daily before meals and nightly. 360 tablet 0    alprazolam (XANAX) 0.25 MG tablet Take 0.25 mg by mouth 2 (two) times daily as needed. pateint unsure of dosage      alprazolam (XANAX) 2 MG Tab   1    carisoprodol (SOMA) 350 MG tablet Take 350 mg by mouth 4 (four) times daily as needed. Patient unsure of mg or dosage       citalopram (CELEXA) 20 MG tablet Take 20 mg by mouth once daily.      diclofenac (VOLTAREN) 50 MG EC tablet       estrogens, conjugated, (PREMARIN) 1.25 MG tablet Take 1.25 mg by mouth once daily. Patient unsure of dosage  "      hydrOXYzine pamoate (VISTARIL) 50 MG Cap   1    mupirocin (BACTROBAN) 2 % ointment Apply topically 3 (three) times daily. (Patient not taking: Reported on 9/18/2023) 22 g 0    oxycodone-acetaminophen (PERCOCET) 5-325 mg per tablet Take 1 tablet by mouth every 6 (six) hours as needed for Pain. (Patient not taking: Reported on 9/18/2023) 42 tablet 0    promethazine (PHENERGAN) 25 MG tablet Take 1 tablet (25 mg total) by mouth every 6 (six) hours as needed for Nausea. (Patient not taking: Reported on 9/18/2023) 20 tablet 0    tizanidine (ZANAFLEX) 4 MG tablet TAKE 1 TABLET BY MOUTH EVERY NIGHT AT BEDTIME AS NEEDED (Patient not taking: Reported on 9/18/2023) 90 tablet 0     No current facility-administered medications for this visit.       Past Medical History:   Diagnosis Date    Anemia     Cancer     female    Diverticulosis     Hypertension     Osteoarthritis of midfoot 09/11/2017     Past Surgical History:   Procedure Laterality Date    APPENDECTOMY      breast reduction      CHOLECYSTECTOMY      COLONOSCOPY N/A 7/28/2023    Procedure: COLONOSCOPY;  Surgeon: Bryson Srinivasan MD;  Location: [facility];  Service: Endoscopy;  Laterality: N/A;    ESOPHAGOGASTRODUODENOSCOPY N/A 8/18/2023    Procedure: EGD (ESOPHAGOGASTRODUODENOSCOPY);  Surgeon: Bryson Srinivasan MD;  Location: [facility];  Service: Endoscopy;  Laterality: N/A;    EYE SURGERY Bilateral     PHACO    GALLBLADDER SURGERY      GASTRIC RESTRICTION SURGERY      HYSTERECTOMY      JOINT REPLACEMENT      left knee replacement      april 2017    LIPOSUCTION      to legs x3    TONSILLECTOMY      TOTAL REDUCTION MAMMOPLASTY      tummy tuck      VARICOSE VEIN SURGERY      WRIST ARTHROPLASTY       Family History   Problem Relation Age of Onset    Heart attack Mother     Heart attack Father     Stomach cancer Other     Rectal cancer Other     Liver cancer Other     Esophageal cancer Other     Crohn's disease Other     Colon polyps Other     Colon cancer Other   "    Social History     Tobacco Use    Smoking status: Never    Smokeless tobacco: Never   Substance Use Topics    Alcohol use: Yes     Alcohol/week: 2.0 standard drinks of alcohol     Types: 1 Cans of beer, 1 Shots of liquor per week     Comment: weekends    Drug use: No        Review of Systems:  Review of Systems   Constitutional:  Negative for fever.   HENT: Negative.     Eyes: Negative.    Respiratory: Negative.     Cardiovascular: Negative.    Gastrointestinal:  Positive for abdominal pain.   Endocrine: Negative.    Genitourinary: Negative.    Musculoskeletal: Negative.    Skin: Negative.    Allergic/Immunologic: Negative.    Neurological: Negative.    Hematological: Negative.    Psychiatric/Behavioral: Negative.         OBJECTIVE:     Vital Signs (Most Recent)  Temp: 97.3 °F (36.3 °C) (09/18/23 0955)  Pulse: 73 (09/18/23 0955)  BP: (!) 89/50 (09/18/23 0955)  5' 3" (1.6 m)  110.9 kg (244 lb 7.8 oz)     Physical Exam:  Physical Exam  Constitutional:       General: She is not in acute distress.     Appearance: Normal appearance. She is obese. She is not ill-appearing, toxic-appearing or diaphoretic.   HENT:      Head: Normocephalic.      Nose: Nose normal.   Eyes:      Conjunctiva/sclera: Conjunctivae normal.   Cardiovascular:      Rate and Rhythm: Normal rate and regular rhythm.   Pulmonary:      Effort: Pulmonary effort is normal.   Abdominal:      Palpations: Abdomen is soft.      Comments: There is superficial ulceration in the midportion of her abdomen below the pannus.   Musculoskeletal:         General: Normal range of motion.      Cervical back: Normal range of motion.   Skin:     General: Skin is warm.   Neurological:      General: No focal deficit present.      Mental Status: She is alert.   Psychiatric:         Mood and Affect: Mood normal.           Diagnostic Results:  CT scan was reviewed.  She has a small hiatal hernia with a gastric staple line.    ASSESSMENT/PLAN:     68-year-old female with a " history of bariatric surgery with a small hiatal hernia, abdominoplasty, who is still morbidly obese, who presents with a small superficial wound on her lower abdomen in her pannus.  No apparent abscess.  This appears to be a small wound.    PLAN:  Discuss that her wound is likely multifactorial related to BMI location.  Would recommend local wound care.  With regards to her hiatal hernia which may be causing symptoms, discussed following up with a bariatric surgeon to consider conversion to gastric bypass.  Up with me as needed.

## 2023-09-20 ENCOUNTER — TELEPHONE (OUTPATIENT)
Dept: BARIATRICS | Facility: CLINIC | Age: 68
End: 2023-09-20
Payer: MEDICARE

## 2023-11-09 RX ORDER — SUCRALFATE 1 G/1
1 TABLET ORAL
Qty: 360 TABLET | Refills: 0 | Status: SHIPPED | OUTPATIENT
Start: 2023-11-09 | End: 2024-02-07

## 2024-01-25 DIAGNOSIS — M85.88 OTHER SPECIFIED DISORDERS OF BONE DENSITY AND STRUCTURE, OTHER SITE: ICD-10-CM

## 2024-01-25 DIAGNOSIS — Z13.820 SCREENING FOR OSTEOPOROSIS: Primary | ICD-10-CM

## 2024-01-26 ENCOUNTER — HOSPITAL ENCOUNTER (EMERGENCY)
Facility: HOSPITAL | Age: 69
Discharge: HOME | End: 2024-01-27
Attending: EMERGENCY MEDICINE
Payer: MEDICARE

## 2024-01-26 DIAGNOSIS — S62.102D CLOSED FRACTURE OF LEFT WRIST WITH ROUTINE HEALING, SUBSEQUENT ENCOUNTER: Primary | ICD-10-CM

## 2024-01-26 DIAGNOSIS — S52.90XA RADIUS FRACTURE: ICD-10-CM
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
## 2024-01-26 PROCEDURE — 25605 CLTX DST RDL FX/EPHYS SEP W/: CPT | Mod: LT

## 2024-01-26 PROCEDURE — 99284 EMERGENCY DEPT VISIT MOD MDM: CPT

## 2024-01-26 PROCEDURE — 25000003 PHARM REV CODE 250: Performed by: STUDENT IN AN ORGANIZED HEALTH CARE EDUCATION/TRAINING PROGRAM

## 2024-01-26 RX ORDER — HYDROCODONE BITARTRATE AND ACETAMINOPHEN 5; 325 MG/1; MG/1
1 TABLET ORAL
Status: COMPLETED | OUTPATIENT
Start: 2024-01-27 | End: 2024-01-26

## 2024-01-26 RX ADMIN — HYDROCODONE BITARTRATE AND ACETAMINOPHEN 1 TABLET: 5; 325 TABLET ORAL at 11:01

## 2024-01-27 VITALS
HEART RATE: 65 BPM | TEMPERATURE: 98 F | SYSTOLIC BLOOD PRESSURE: 134 MMHG | OXYGEN SATURATION: 95 % | BODY MASS INDEX: 42.51 KG/M2 | WEIGHT: 240 LBS | DIASTOLIC BLOOD PRESSURE: 74 MMHG | RESPIRATION RATE: 18 BRPM

## 2024-01-27 PROCEDURE — 25605 CLTX DST RDL FX/EPHYS SEP W/: CPT | Mod: LT

## 2024-01-27 PROCEDURE — 25000003 PHARM REV CODE 250: Performed by: STUDENT IN AN ORGANIZED HEALTH CARE EDUCATION/TRAINING PROGRAM

## 2024-01-27 PROCEDURE — 96372 THER/PROPH/DIAG INJ SC/IM: CPT | Mod: 59 | Performed by: STUDENT IN AN ORGANIZED HEALTH CARE EDUCATION/TRAINING PROGRAM

## 2024-01-27 PROCEDURE — 63600175 PHARM REV CODE 636 W HCPCS: Performed by: STUDENT IN AN ORGANIZED HEALTH CARE EDUCATION/TRAINING PROGRAM

## 2024-01-27 RX ORDER — LIDOCAINE HYDROCHLORIDE 10 MG/ML
20 INJECTION, SOLUTION EPIDURAL; INFILTRATION; INTRACAUDAL; PERINEURAL
Status: COMPLETED | OUTPATIENT
Start: 2024-01-27 | End: 2024-01-27

## 2024-01-27 RX ORDER — HYDROMORPHONE HYDROCHLORIDE 1 MG/ML
1 INJECTION, SOLUTION INTRAMUSCULAR; INTRAVENOUS; SUBCUTANEOUS
Status: COMPLETED | OUTPATIENT
Start: 2024-01-27 | End: 2024-01-27

## 2024-01-27 RX ADMIN — LIDOCAINE HYDROCHLORIDE 200 MG: 10 SOLUTION INTRAVENOUS at 12:01

## 2024-01-27 RX ADMIN — HYDROMORPHONE HYDROCHLORIDE 1 MG: 1 INJECTION, SOLUTION INTRAMUSCULAR; INTRAVENOUS; SUBCUTANEOUS at 12:01

## 2024-01-27 NOTE — ED PROVIDER NOTES
Encounter Date: 1/26/2024       History     Chief Complaint   Patient presents with    Wrist Injury     Fell and caught self with left wrist. Deformity noted. PMS intact     HPI  Review of patient's allergies indicates:   Allergen Reactions    Bee sting [allergen ext-venom-honey bee] Shortness Of Breath    Pcn [penicillins] Hives    Vancomycin analogues Rash     Past Medical History:   Diagnosis Date    Anemia     Cancer     female    Diverticulosis     Hypertension     Osteoarthritis of midfoot 09/11/2017     Past Surgical History:   Procedure Laterality Date    APPENDECTOMY      breast reduction      CHOLECYSTECTOMY      COLONOSCOPY N/A 7/28/2023    Procedure: COLONOSCOPY;  Surgeon: Bryson Srinivasan MD;  Location: [facility];  Service: Endoscopy;  Laterality: N/A;    ESOPHAGOGASTRODUODENOSCOPY N/A 8/18/2023    Procedure: EGD (ESOPHAGOGASTRODUODENOSCOPY);  Surgeon: Bryson Srinivasan MD;  Location: [facility];  Service: Endoscopy;  Laterality: N/A;    EYE SURGERY Bilateral     PHACO    GALLBLADDER SURGERY      GASTRIC RESTRICTION SURGERY      HYSTERECTOMY      JOINT REPLACEMENT      left knee replacement      april 2017    LIPOSUCTION      to legs x3    TONSILLECTOMY      TOTAL REDUCTION MAMMOPLASTY      tummy tuck      VARICOSE VEIN SURGERY      WRIST ARTHROPLASTY       Family History   Problem Relation Age of Onset    Heart attack Mother     Heart attack Father     Stomach cancer Other     Rectal cancer Other     Liver cancer Other     Esophageal cancer Other     Crohn's disease Other     Colon polyps Other     Colon cancer Other      Social History     Tobacco Use    Smoking status: Never    Smokeless tobacco: Never   Substance Use Topics    Alcohol use: Yes     Alcohol/week: 2.0 standard drinks of alcohol     Types: 1 Cans of beer, 1 Shots of liquor per week     Comment: weekends    Drug use: No     Review of Systems    Physical Exam     Initial Vitals   BP Pulse Resp Temp  SpO2   01/26/24 2351 01/26/24 2344 01/26/24 2344 01/26/24 2344 01/26/24 2344   134/63 71 20 97.6 °F (36.4 °C) 96 %      MAP       --                Physical Exam    ED Course   Procedures  Labs Reviewed - No data to display       Imaging Results              X-Ray Forearm Left (In process)                      X-Ray Wrist Complete Left (In process)                      Medications   HYDROcodone-acetaminophen 5-325 mg per tablet 1 tablet (1 tablet Oral Given 1/26/24 2359)   LIDOcaine (PF) 10 mg/ml (1%) injection 200 mg (200 mg Infiltration Given 1/27/24 0034)   HYDROmorphone injection 1 mg (1 mg Intramuscular Given 1/27/24 0043)     Medical Decision Making  Amount and/or Complexity of Data Reviewed  Radiology: ordered.    Risk  Prescription drug management.                                      Clinical Impression:  Final diagnoses:  [W19.XXXA] Fall  [S62.102D] Closed fracture of left wrist with routine healing, subsequent encounter (Primary)
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
## 2024-01-31 ENCOUNTER — OFFICE VISIT (OUTPATIENT)
Dept: ORTHOPEDICS | Facility: CLINIC | Age: 69
End: 2024-01-31
Payer: MEDICARE

## 2024-01-31 VITALS — RESPIRATION RATE: 20 BRPM | BODY MASS INDEX: 42.54 KG/M2 | HEIGHT: 63 IN | WEIGHT: 240.06 LBS

## 2024-01-31 DIAGNOSIS — S62.102D CLOSED FRACTURE OF LEFT WRIST WITH ROUTINE HEALING, SUBSEQUENT ENCOUNTER: Primary | ICD-10-CM

## 2024-01-31 DIAGNOSIS — S52.502A CLOSED FRACTURE OF DISTAL END OF LEFT RADIUS, UNSPECIFIED FRACTURE MORPHOLOGY, INITIAL ENCOUNTER: ICD-10-CM

## 2024-01-31 PROCEDURE — 1100F PTFALLS ASSESS-DOCD GE2>/YR: CPT | Mod: CPTII,S$GLB,, | Performed by: ORTHOPAEDIC SURGERY

## 2024-01-31 PROCEDURE — 1125F AMNT PAIN NOTED PAIN PRSNT: CPT | Mod: CPTII,S$GLB,, | Performed by: ORTHOPAEDIC SURGERY

## 2024-01-31 PROCEDURE — 3288F FALL RISK ASSESSMENT DOCD: CPT | Mod: CPTII,S$GLB,, | Performed by: ORTHOPAEDIC SURGERY

## 2024-01-31 PROCEDURE — 1159F MED LIST DOCD IN RCRD: CPT | Mod: CPTII,S$GLB,, | Performed by: ORTHOPAEDIC SURGERY

## 2024-01-31 PROCEDURE — 1160F RVW MEDS BY RX/DR IN RCRD: CPT | Mod: CPTII,S$GLB,, | Performed by: ORTHOPAEDIC SURGERY

## 2024-01-31 PROCEDURE — 4010F ACE/ARB THERAPY RXD/TAKEN: CPT | Mod: CPTII,S$GLB,, | Performed by: ORTHOPAEDIC SURGERY

## 2024-01-31 PROCEDURE — 99999 PR PBB SHADOW E&M-EST. PATIENT-LVL IV: CPT | Mod: PBBFAC,,, | Performed by: ORTHOPAEDIC SURGERY

## 2024-01-31 PROCEDURE — 3008F BODY MASS INDEX DOCD: CPT | Mod: CPTII,S$GLB,, | Performed by: ORTHOPAEDIC SURGERY

## 2024-01-31 PROCEDURE — 99204 OFFICE O/P NEW MOD 45 MIN: CPT | Mod: S$GLB,,, | Performed by: ORTHOPAEDIC SURGERY

## 2024-01-31 NOTE — PROGRESS NOTES
Subjective:      Patient ID: Megha Robb is a 69 y.o. female.    Chief Complaint: Pain and Injury of the Left Wrist    HPI  69-year-old right-hand dominant female with a several day history of left wrist pain.  She sustained accidental blunt trauma during a fall.  Was seen in urgent Care placed into a splint and referred for further evaluation.  She has had previous trauma to the wrist with some resultant pain and stiffness pain has improved with relative rest and immobilization  ROS      Objective:    Ortho Exam     Constitutional:   Patient is alert  and oriented in no acute distress  HEENT:  normocephalic atraumatic; PERRL EOMI  Neck:  Supple without adenopathy  Cardiovascular:  Normal rate and rhythm  Pulmonary:  Normal respiratory effort normal chest wall expansion  Abdominal:  Nonprotuberant nondistended  Musculoskeletal:  Patient has mild swelling subtle deformity at the wrist  She has intact skin, sensation, and brisk capillary refill of digits  Neurological:  No focal defect; cranial nerves 2-12 grossly intact  Psychiatric/behavioral:  Mood and behavior normal      X-Ray Wrist Complete Left  CLINICAL HISTORY:  68 years (1955) Female Post reduction    TECHNIQUE:  XR WRIST 3 OR MORE VIEWS LEFT. 2 images obtained.    COMPARISON:  January 27, 2024    FINDINGS:    Overlying cast material obscures hepatic and bony soft tissue detail. Impacted fracture of the distal radius has been adequately reduced. Ulnar styloid fracture is noted. There are chronic degenerative changes about the triscaphe joint and CMC joint of the thumb.    IMPRESSION:  Adequate reduction of the distal radial fracture. Ulnar styloid fracture is noted.    Electronically signed by:  Ruperto Castaneda MD  01/27/2024 08:57 AM [facility] Workstation: [id number]  X-Ray Forearm Left  CLINICAL HISTORY:  68 years (1955) Female Wrist Injury (Fell and caught self with left wrist. Deformity noted. PMS intact)    TECHNIQUE:  XR FOREARM 2 VIEWS LEFT.  2 images obtained.    COMPARISON:  None available.    FINDINGS:    Impacted fracture of the distal radius with evidence of the superior distraction on the lateral projection. This best observed on the accompanying plain from radiographs of the hand. Associated ulnar styloid fracture is noted faintly seen on this examination.    IMPRESSION: Impacted fracture of the distal radius with evidence of minimal superior distraction.    Electronically signed by:  Ruperto Castaneda MD  01/27/2024 08:01 AM [facility] Workstation: [id number]  X-Ray Wrist Complete Left  CLINICAL HISTORY:  68 years (1955) Female Wrist Injury (Fell and caught self with left wrist. Deformity noted. PMS intact)    TECHNIQUE:  XR WRIST 3 OR MORE VIEWS LEFT. 3 images obtained.    COMPARISON:  3/10/2016    FINDINGS:    Recurrent impacted fracture of the distal radius with evidence of superior distraction of the distal segment the order 7 mm. There is an associated ulnar styloid fracture without evidence of malalignment changes. Chronic degenerative changes across the CMC joint of the thumb as well as the triscaphe joint noted.    IMPRESSION:  Recurrent impacted fracture of the distal radius with superior distraction of the distal segment the order 7 mm. Associated ulnar styloid fracture.    Electronically signed by:  Ruperto Castaneda MD  01/27/2024 07:59 AM [facility] Workstation: [id number]       My Radiographs Findings:    I have personally reviewed radiographs and concur with above findings    Assessment:       Encounter Diagnosis   Name Primary?    Closed fracture of distal end of left radius, unspecified fracture morphology, initial encounter          Plan:       I have discussed medical condition treatment options with her at length.  She declines any consideration for an attempt at a formal closed reduction or operative intervention.  I have explained the possibility of some generalized stiffness in the wrist malunion nonunion in an ongoing pain.  We have  discussed generalized activity restrictions elevation of the extremity digit range of motion exercises.  I will get her into a an Exos splint I will see her back in 3 weeks for repeat radiographs sooner if any questions or problems.        Past Medical History:   Diagnosis Date    Anemia     Cancer     female    Diverticulosis     Hypertension     Osteoarthritis of midfoot 09/11/2017     Past Surgical History:   Procedure Laterality Date    APPENDECTOMY      breast reduction      CHOLECYSTECTOMY      COLONOSCOPY N/A 7/28/2023    Procedure: COLONOSCOPY;  Surgeon: Bryson Srinivasan MD;  Location: [facility];  Service: Endoscopy;  Laterality: N/A;    ESOPHAGOGASTRODUODENOSCOPY N/A 8/18/2023    Procedure: EGD (ESOPHAGOGASTRODUODENOSCOPY);  Surgeon: Bryson Srinivasan MD;  Location: [facility];  Service: Endoscopy;  Laterality: N/A;    EYE SURGERY Bilateral     PHACO    GALLBLADDER SURGERY      GASTRIC RESTRICTION SURGERY      HYSTERECTOMY      JOINT REPLACEMENT      left knee replacement      april 2017    LIPOSUCTION      to legs x3    TONSILLECTOMY      TOTAL REDUCTION MAMMOPLASTY      tummy tuck      VARICOSE VEIN SURGERY      WRIST ARTHROPLASTY           Current Outpatient Medications:     alprazolam (XANAX) 0.25 MG tablet, Take 0.25 mg by mouth 2 (two) times daily as needed. pateint unsure of dosage, Disp: , Rfl:     alprazolam (XANAX) 2 MG Tab, , Disp: , Rfl: 1    carisoprodol (SOMA) 350 MG tablet, Take 350 mg by mouth 4 (four) times daily as needed. Patient unsure of mg or dosage , Disp: , Rfl:     citalopram (CELEXA) 20 MG tablet, Take 20 mg by mouth once daily., Disp: , Rfl:     diclofenac (VOLTAREN) 0.1 % ophthalmic solution, 1 drop 4 (four) times daily., Disp: , Rfl:     diclofenac (VOLTAREN) 50 MG EC tablet, , Disp: , Rfl:     estrogens, conjugated, (PREMARIN) 1.25 MG tablet, Take 1.25 mg by mouth once daily. Patient unsure of dosage , Disp: , Rfl:     ferrous sulfate 325 (65 FE) MG EC tablet, Take 325 mg by  mouth once daily. , Disp: , Rfl:     FLUVIRIN 5409-7229 45 mcg (15 mcg x 3)/0.5 mL Susp, , Disp: , Rfl: 0    hydrocodone-acetaminophen (LORCET)  mg per tablet, Take 1 tablet by mouth every 6 (six) hours as needed., Disp: , Rfl:     hydrOXYzine pamoate (VISTARIL) 50 MG Cap, , Disp: , Rfl: 1    multivitamin (THERAGRAN) per tablet, Take 1 tablet by mouth once daily.  , Disp: , Rfl:     mupirocin (BACTROBAN) 2 % ointment, Apply topically 3 (three) times daily. (Patient not taking: Reported on 9/18/2023), Disp: 22 g, Rfl: 0    oxycodone-acetaminophen (PERCOCET) 5-325 mg per tablet, Take 1 tablet by mouth every 6 (six) hours as needed for Pain. (Patient not taking: Reported on 9/18/2023), Disp: 42 tablet, Rfl: 0    pantoprazole (PROTONIX) 40 MG tablet, Take 1 tablet (40 mg total) by mouth once daily., Disp: 90 tablet, Rfl: 1    pregabalin (LYRICA) 50 MG capsule, Take 50 mg by mouth 3 (three) times daily., Disp: , Rfl:     promethazine (PHENERGAN) 25 MG tablet, Take 1 tablet (25 mg total) by mouth every 6 (six) hours as needed for Nausea. (Patient not taking: Reported on 9/18/2023), Disp: 20 tablet, Rfl: 0    quinapril (ACCUPRIL) 5 MG tablet, Take 40 mg by mouth every evening. Patient unsure of dosage, Disp: , Rfl:     sucralfate (CARAFATE) 1 gram tablet, Take 1 tablet (1 g total) by mouth 4 (four) times daily before meals and nightly., Disp: 360 tablet, Rfl: 0    tizanidine (ZANAFLEX) 4 MG tablet, TAKE 1 TABLET BY MOUTH EVERY NIGHT AT BEDTIME AS NEEDED (Patient not taking: Reported on 9/18/2023), Disp: 90 tablet, Rfl: 0    Review of patient's allergies indicates:   Allergen Reactions    Bee sting [allergen ext-venom-honey bee] Shortness Of Breath    Pcn [penicillins] Hives    Vancomycin analogues Rash       Family History   Problem Relation Age of Onset    Heart attack Mother     Heart attack Father     Stomach cancer Other     Rectal cancer Other     Liver cancer Other     Esophageal cancer Other     Crohn's  disease Other     Colon polyps Other     Colon cancer Other      Social History     Occupational History    Not on file   Tobacco Use    Smoking status: Never    Smokeless tobacco: Never   Substance and Sexual Activity    Alcohol use: Yes     Alcohol/week: 2.0 standard drinks of alcohol     Types: 1 Cans of beer, 1 Shots of liquor per week     Comment: weekends    Drug use: No    Sexual activity: Not on file

## 2024-02-23 ENCOUNTER — HOSPITAL ENCOUNTER (OUTPATIENT)
Dept: RADIOLOGY | Facility: HOSPITAL | Age: 69
Discharge: HOME | End: 2024-02-23
Attending: FAMILY MEDICINE
Payer: MEDICARE

## 2024-02-23 DIAGNOSIS — Z13.820 SCREENING FOR OSTEOPOROSIS: ICD-10-CM

## 2024-02-23 DIAGNOSIS — M85.88 OTHER SPECIFIED DISORDERS OF BONE DENSITY AND STRUCTURE, OTHER SITE: ICD-10-CM

## 2024-02-23 PROCEDURE — 77080 DXA BONE DENSITY AXIAL: CPT | Mod: TC,PO

## 2024-03-25 ENCOUNTER — HOSPITAL ENCOUNTER (INPATIENT)
Facility: HOSPITAL | Age: 69
LOS: 7 days | Discharge: HOME | DRG: 193 | End: 2024-04-01
Attending: STUDENT IN AN ORGANIZED HEALTH CARE EDUCATION/TRAINING PROGRAM | Admitting: INTERNAL MEDICINE
Payer: MEDICARE

## 2024-03-25 DIAGNOSIS — M19.079: ICD-10-CM

## 2024-03-25 DIAGNOSIS — R07.9 CHEST PAIN: ICD-10-CM

## 2024-03-25 DIAGNOSIS — J18.9 MULTIFOCAL PNEUMONIA: ICD-10-CM

## 2024-03-25 DIAGNOSIS — R00.0 TACHYCARDIA: ICD-10-CM

## 2024-03-25 DIAGNOSIS — M79.604 LEG PAIN, BILATERAL: ICD-10-CM

## 2024-03-25 DIAGNOSIS — R06.03 ACUTE RESPIRATORY DISTRESS: Primary | ICD-10-CM

## 2024-03-25 DIAGNOSIS — A41.9 SEPSIS, DUE TO UNSPECIFIED ORGANISM, UNSPECIFIED WHETHER ACUTE ORGAN DYSFUNCTION PRESENT: ICD-10-CM

## 2024-03-25 DIAGNOSIS — I83.893 VARICOSE VEINS OF BILATERAL LOWER EXTREMITIES WITH OTHER COMPLICATIONS: ICD-10-CM

## 2024-03-25 DIAGNOSIS — M79.605 LEG PAIN, BILATERAL: ICD-10-CM

## 2024-03-25 DIAGNOSIS — J96.01 ACUTE HYPOXIC RESPIRATORY FAILURE: ICD-10-CM

## 2024-03-25 DIAGNOSIS — R09.02 HYPOXIA: ICD-10-CM

## 2024-03-25 PROBLEM — R53.81 PHYSICAL DECONDITIONING: Status: ACTIVE | Noted: 2024-03-25

## 2024-03-25 PROBLEM — Z98.890 STATUS POST SURGERY: Status: ACTIVE | Noted: 2024-03-25

## 2024-03-25 LAB
ALBUMIN SERPL BCP-MCNC: 3.6 G/DL (ref 3.5–5.2)
ALLENS TEST: NORMAL
ALP SERPL-CCNC: 111 U/L (ref 55–135)
ALT SERPL W/O P-5'-P-CCNC: 10 U/L (ref 10–44)
ANION GAP SERPL CALC-SCNC: 8 MMOL/L (ref 8–16)
AST SERPL-CCNC: 28 U/L (ref 10–40)
BASOPHILS # BLD AUTO: 0.02 K/UL (ref 0–0.2)
BASOPHILS NFR BLD: 0.2 % (ref 0–1.9)
BILIRUB SERPL-MCNC: 1.1 MG/DL (ref 0.1–1)
BNP SERPL-MCNC: 85 PG/ML (ref 0–99)
BUN SERPL-MCNC: 13 MG/DL (ref 8–23)
CALCIUM SERPL-MCNC: 9.1 MG/DL (ref 8.7–10.5)
CHLORIDE SERPL-SCNC: 100 MMOL/L (ref 95–110)
CO2 SERPL-SCNC: 27 MMOL/L (ref 23–29)
CREAT SERPL-MCNC: 0.8 MG/DL (ref 0.5–1.4)
DELSYS: NORMAL
DIFFERENTIAL METHOD BLD: ABNORMAL
EOSINOPHIL # BLD AUTO: 0 K/UL (ref 0–0.5)
EOSINOPHIL NFR BLD: 0.1 % (ref 0–8)
ERYTHROCYTE [DISTWIDTH] IN BLOOD BY AUTOMATED COUNT: 13.7 % (ref 11.5–14.5)
EST. GFR  (NO RACE VARIABLE): >60 ML/MIN/1.73 M^2
FLOW: 3
GLUCOSE SERPL-MCNC: 121 MG/DL (ref 70–110)
GROUP A STREP, MOLECULAR: NEGATIVE
HCO3 UR-SCNC: 25.5 MMOL/L (ref 24–28)
HCT VFR BLD AUTO: 36.1 % (ref 37–48.5)
HGB BLD-MCNC: 12 G/DL (ref 12–16)
IMM GRANULOCYTES # BLD AUTO: 0.02 K/UL (ref 0–0.04)
IMM GRANULOCYTES NFR BLD AUTO: 0.2 % (ref 0–0.5)
INFLUENZA A, MOLECULAR: NEGATIVE
INFLUENZA B, MOLECULAR: NEGATIVE
LACTATE SERPL-SCNC: 1.7 MMOL/L (ref 0.5–1.9)
LYMPHOCYTES # BLD AUTO: 0.6 K/UL (ref 1–4.8)
LYMPHOCYTES NFR BLD: 5.6 % (ref 18–48)
MCH RBC QN AUTO: 31.8 PG (ref 27–31)
MCHC RBC AUTO-ENTMCNC: 33.2 G/DL (ref 32–36)
MCV RBC AUTO: 96 FL (ref 82–98)
MODE: NORMAL
MONOCYTES # BLD AUTO: 0.7 K/UL (ref 0.3–1)
MONOCYTES NFR BLD: 6.5 % (ref 4–15)
NEUTROPHILS # BLD AUTO: 9 K/UL (ref 1.8–7.7)
NEUTROPHILS NFR BLD: 87.4 % (ref 38–73)
NRBC BLD-RTO: 0 /100 WBC
PCO2 BLDA: 40.7 MMHG (ref 35–45)
PH SMN: 7.41 [PH] (ref 7.35–7.45)
PLATELET # BLD AUTO: 247 K/UL (ref 150–450)
PMV BLD AUTO: 9.3 FL (ref 9.2–12.9)
PO2 BLDA: 98 MMHG (ref 80–100)
POC BE: 1 MMOL/L
POC SATURATED O2: 98 % (ref 95–100)
POC TCO2: 27 MMOL/L (ref 23–27)
POTASSIUM SERPL-SCNC: 3.4 MMOL/L (ref 3.5–5.1)
PROCALCITONIN SERPL IA-MCNC: 3.59 NG/ML (ref 0–0.5)
PROT SERPL-MCNC: 7 G/DL (ref 6–8.4)
RBC # BLD AUTO: 3.77 M/UL (ref 4–5.4)
SAMPLE: NORMAL
SARS-COV-2 RDRP RESP QL NAA+PROBE: NEGATIVE
SITE: NORMAL
SODIUM SERPL-SCNC: 135 MMOL/L (ref 136–145)
SPECIMEN SOURCE: NORMAL
TROPONIN I SERPL HS-MCNC: 4.4 PG/ML (ref 0–14.9)
WBC # BLD AUTO: 10.33 K/UL (ref 3.9–12.7)

## 2024-03-25 PROCEDURE — 96361 HYDRATE IV INFUSION ADD-ON: CPT

## 2024-03-25 PROCEDURE — 94761 N-INVAS EAR/PLS OXIMETRY MLT: CPT | Mod: XB

## 2024-03-25 PROCEDURE — 36415 COLL VENOUS BLD VENIPUNCTURE: CPT | Performed by: STUDENT IN AN ORGANIZED HEALTH CARE EDUCATION/TRAINING PROGRAM

## 2024-03-25 PROCEDURE — 99900035 HC TECH TIME PER 15 MIN (STAT)

## 2024-03-25 PROCEDURE — 99291 CRITICAL CARE FIRST HOUR: CPT

## 2024-03-25 PROCEDURE — 84484 ASSAY OF TROPONIN QUANT: CPT | Performed by: STUDENT IN AN ORGANIZED HEALTH CARE EDUCATION/TRAINING PROGRAM

## 2024-03-25 PROCEDURE — 93010 ELECTROCARDIOGRAM REPORT: CPT | Mod: ,,, | Performed by: GENERAL PRACTICE

## 2024-03-25 PROCEDURE — 87651 STREP A DNA AMP PROBE: CPT | Performed by: STUDENT IN AN ORGANIZED HEALTH CARE EDUCATION/TRAINING PROGRAM

## 2024-03-25 PROCEDURE — U0002 COVID-19 LAB TEST NON-CDC: HCPCS | Performed by: STUDENT IN AN ORGANIZED HEALTH CARE EDUCATION/TRAINING PROGRAM

## 2024-03-25 PROCEDURE — 96367 TX/PROPH/DG ADDL SEQ IV INF: CPT

## 2024-03-25 PROCEDURE — 94640 AIRWAY INHALATION TREATMENT: CPT

## 2024-03-25 PROCEDURE — 93005 ELECTROCARDIOGRAM TRACING: CPT | Performed by: GENERAL PRACTICE

## 2024-03-25 PROCEDURE — 99900031 HC PATIENT EDUCATION (STAT)

## 2024-03-25 PROCEDURE — 25000003 PHARM REV CODE 250: Performed by: STUDENT IN AN ORGANIZED HEALTH CARE EDUCATION/TRAINING PROGRAM

## 2024-03-25 PROCEDURE — 51701 INSERT BLADDER CATHETER: CPT

## 2024-03-25 PROCEDURE — 87040 BLOOD CULTURE FOR BACTERIA: CPT | Performed by: STUDENT IN AN ORGANIZED HEALTH CARE EDUCATION/TRAINING PROGRAM

## 2024-03-25 PROCEDURE — 80053 COMPREHEN METABOLIC PANEL: CPT | Performed by: STUDENT IN AN ORGANIZED HEALTH CARE EDUCATION/TRAINING PROGRAM

## 2024-03-25 PROCEDURE — 84145 PROCALCITONIN (PCT): CPT | Performed by: STUDENT IN AN ORGANIZED HEALTH CARE EDUCATION/TRAINING PROGRAM

## 2024-03-25 PROCEDURE — 27000221 HC OXYGEN, UP TO 24 HOURS

## 2024-03-25 PROCEDURE — 63600175 PHARM REV CODE 636 W HCPCS: Performed by: INTERNAL MEDICINE

## 2024-03-25 PROCEDURE — 82803 BLOOD GASES ANY COMBINATION: CPT

## 2024-03-25 PROCEDURE — 96365 THER/PROPH/DIAG IV INF INIT: CPT

## 2024-03-25 PROCEDURE — 83605 ASSAY OF LACTIC ACID: CPT | Performed by: STUDENT IN AN ORGANIZED HEALTH CARE EDUCATION/TRAINING PROGRAM

## 2024-03-25 PROCEDURE — 36600 WITHDRAWAL OF ARTERIAL BLOOD: CPT

## 2024-03-25 PROCEDURE — 87502 INFLUENZA DNA AMP PROBE: CPT | Performed by: STUDENT IN AN ORGANIZED HEALTH CARE EDUCATION/TRAINING PROGRAM

## 2024-03-25 PROCEDURE — 85025 COMPLETE CBC W/AUTO DIFF WBC: CPT | Performed by: STUDENT IN AN ORGANIZED HEALTH CARE EDUCATION/TRAINING PROGRAM

## 2024-03-25 PROCEDURE — 94799 UNLISTED PULMONARY SVC/PX: CPT

## 2024-03-25 PROCEDURE — 83880 ASSAY OF NATRIURETIC PEPTIDE: CPT | Performed by: STUDENT IN AN ORGANIZED HEALTH CARE EDUCATION/TRAINING PROGRAM

## 2024-03-25 PROCEDURE — 63600175 PHARM REV CODE 636 W HCPCS: Performed by: STUDENT IN AN ORGANIZED HEALTH CARE EDUCATION/TRAINING PROGRAM

## 2024-03-25 PROCEDURE — 12000002 HC ACUTE/MED SURGE SEMI-PRIVATE ROOM

## 2024-03-25 PROCEDURE — 25000242 PHARM REV CODE 250 ALT 637 W/ HCPCS: Performed by: INTERNAL MEDICINE

## 2024-03-25 PROCEDURE — 25500020 PHARM REV CODE 255: Performed by: INTERNAL MEDICINE

## 2024-03-25 RX ORDER — ACETAMINOPHEN 325 MG/1
650 TABLET ORAL EVERY 8 HOURS PRN
Status: DISCONTINUED | OUTPATIENT
Start: 2024-03-25 | End: 2024-04-01 | Stop reason: HOSPADM

## 2024-03-25 RX ORDER — ENOXAPARIN SODIUM 100 MG/ML
40 INJECTION SUBCUTANEOUS EVERY 12 HOURS
Status: DISCONTINUED | OUTPATIENT
Start: 2024-03-25 | End: 2024-04-01 | Stop reason: HOSPADM

## 2024-03-25 RX ORDER — LANOLIN ALCOHOL/MO/W.PET/CERES
800 CREAM (GRAM) TOPICAL
Status: DISCONTINUED | OUTPATIENT
Start: 2024-03-25 | End: 2024-04-01 | Stop reason: HOSPADM

## 2024-03-25 RX ORDER — PREGABALIN 200 MG/1
200 CAPSULE ORAL 3 TIMES DAILY
COMMUNITY

## 2024-03-25 RX ORDER — TALC
6 POWDER (GRAM) TOPICAL NIGHTLY PRN
Status: DISCONTINUED | OUTPATIENT
Start: 2024-03-25 | End: 2024-04-01 | Stop reason: HOSPADM

## 2024-03-25 RX ORDER — GLUCAGON 1 MG
1 KIT INJECTION
Status: DISCONTINUED | OUTPATIENT
Start: 2024-03-25 | End: 2024-04-01 | Stop reason: HOSPADM

## 2024-03-25 RX ORDER — HYDRALAZINE HYDROCHLORIDE 20 MG/ML
10 INJECTION INTRAMUSCULAR; INTRAVENOUS EVERY 6 HOURS PRN
Status: DISCONTINUED | OUTPATIENT
Start: 2024-03-26 | End: 2024-04-01 | Stop reason: HOSPADM

## 2024-03-25 RX ORDER — ENOXAPARIN SODIUM 100 MG/ML
40 INJECTION SUBCUTANEOUS EVERY 24 HOURS
Status: DISCONTINUED | OUTPATIENT
Start: 2024-03-25 | End: 2024-03-25

## 2024-03-25 RX ORDER — LINEZOLID 2 MG/ML
600 INJECTION, SOLUTION INTRAVENOUS
Status: COMPLETED | OUTPATIENT
Start: 2024-03-25 | End: 2024-03-26

## 2024-03-25 RX ORDER — LINEZOLID 2 MG/ML
600 INJECTION, SOLUTION INTRAVENOUS
Status: DISCONTINUED | OUTPATIENT
Start: 2024-03-26 | End: 2024-03-29

## 2024-03-25 RX ORDER — ACETAMINOPHEN 325 MG/1
650 TABLET ORAL EVERY 4 HOURS PRN
Status: DISCONTINUED | OUTPATIENT
Start: 2024-03-25 | End: 2024-04-01 | Stop reason: HOSPADM

## 2024-03-25 RX ORDER — ALUMINUM HYDROXIDE, MAGNESIUM HYDROXIDE, AND SIMETHICONE 1200; 120; 1200 MG/30ML; MG/30ML; MG/30ML
30 SUSPENSION ORAL 4 TIMES DAILY PRN
Status: DISCONTINUED | OUTPATIENT
Start: 2024-03-25 | End: 2024-04-01 | Stop reason: HOSPADM

## 2024-03-25 RX ORDER — HYDROCODONE BITARTRATE AND ACETAMINOPHEN 5; 325 MG/1; MG/1
1 TABLET ORAL EVERY 6 HOURS PRN
Status: DISCONTINUED | OUTPATIENT
Start: 2024-03-25 | End: 2024-04-01 | Stop reason: HOSPADM

## 2024-03-25 RX ORDER — BUSPIRONE HYDROCHLORIDE 10 MG/1
10 TABLET ORAL 2 TIMES DAILY
COMMUNITY

## 2024-03-25 RX ORDER — ONDANSETRON HYDROCHLORIDE 2 MG/ML
4 INJECTION, SOLUTION INTRAVENOUS EVERY 6 HOURS PRN
Status: DISCONTINUED | OUTPATIENT
Start: 2024-03-25 | End: 2024-04-01 | Stop reason: HOSPADM

## 2024-03-25 RX ORDER — ACETAMINOPHEN 500 MG
1000 TABLET ORAL
Status: COMPLETED | OUTPATIENT
Start: 2024-03-25 | End: 2024-03-25

## 2024-03-25 RX ORDER — HYDROCODONE BITARTRATE AND ACETAMINOPHEN 10; 325 MG/1; MG/1
1 TABLET ORAL 4 TIMES DAILY PRN
COMMUNITY

## 2024-03-25 RX ORDER — SODIUM,POTASSIUM PHOSPHATES 280-250MG
2 POWDER IN PACKET (EA) ORAL
Status: DISCONTINUED | OUTPATIENT
Start: 2024-03-25 | End: 2024-04-01 | Stop reason: HOSPADM

## 2024-03-25 RX ORDER — AMITRIPTYLINE HYDROCHLORIDE 25 MG/1
50 TABLET, FILM COATED ORAL NIGHTLY
COMMUNITY
Start: 2024-03-25

## 2024-03-25 RX ORDER — NALOXONE HCL 0.4 MG/ML
0.02 VIAL (ML) INJECTION
Status: DISCONTINUED | OUTPATIENT
Start: 2024-03-25 | End: 2024-04-01 | Stop reason: HOSPADM

## 2024-03-25 RX ORDER — IBUPROFEN 200 MG
24 TABLET ORAL
Status: DISCONTINUED | OUTPATIENT
Start: 2024-03-25 | End: 2024-04-01 | Stop reason: HOSPADM

## 2024-03-25 RX ORDER — SUCRALFATE 1 G/1
1 TABLET ORAL
COMMUNITY

## 2024-03-25 RX ORDER — QUINAPRIL 40 MG/1
40 TABLET ORAL DAILY
Status: ON HOLD | COMMUNITY
End: 2024-04-01 | Stop reason: HOSPADM

## 2024-03-25 RX ORDER — BENZONATATE 100 MG/1
100 CAPSULE ORAL 3 TIMES DAILY PRN
Status: DISCONTINUED | OUTPATIENT
Start: 2024-03-26 | End: 2024-04-01 | Stop reason: HOSPADM

## 2024-03-25 RX ORDER — IPRATROPIUM BROMIDE AND ALBUTEROL SULFATE 2.5; .5 MG/3ML; MG/3ML
3 SOLUTION RESPIRATORY (INHALATION) EVERY 6 HOURS PRN
Status: DISCONTINUED | OUTPATIENT
Start: 2024-03-26 | End: 2024-03-27

## 2024-03-25 RX ORDER — SODIUM CHLORIDE 0.9 % (FLUSH) 0.9 %
2 SYRINGE (ML) INJECTION EVERY 12 HOURS PRN
Status: DISCONTINUED | OUTPATIENT
Start: 2024-03-25 | End: 2024-04-01 | Stop reason: HOSPADM

## 2024-03-25 RX ORDER — IBUPROFEN 200 MG
16 TABLET ORAL
Status: DISCONTINUED | OUTPATIENT
Start: 2024-03-25 | End: 2024-04-01 | Stop reason: HOSPADM

## 2024-03-25 RX ADMIN — LINEZOLID 600 MG: 600 INJECTION, SOLUTION INTRAVENOUS at 10:03

## 2024-03-25 RX ADMIN — ACETAMINOPHEN 1000 MG: 500 TABLET ORAL at 08:03

## 2024-03-25 RX ADMIN — IOHEXOL 100 ML: 350 INJECTION, SOLUTION INTRAVENOUS at 11:03

## 2024-03-25 RX ADMIN — SODIUM CHLORIDE 1572 ML: 9 INJECTION, SOLUTION INTRAVENOUS at 08:03

## 2024-03-25 RX ADMIN — IPRATROPIUM BROMIDE AND ALBUTEROL SULFATE 3 ML: 2.5; .5 SOLUTION RESPIRATORY (INHALATION) at 11:03

## 2024-03-25 RX ADMIN — CEFEPIME 1 G: 1 INJECTION, POWDER, FOR SOLUTION INTRAMUSCULAR; INTRAVENOUS at 09:03

## 2024-03-25 RX ADMIN — ENOXAPARIN SODIUM 40 MG: 40 INJECTION SUBCUTANEOUS at 11:03

## 2024-03-26 LAB
ALBUMIN SERPL BCP-MCNC: 3.2 G/DL (ref 3.5–5.2)
ALP SERPL-CCNC: 97 U/L (ref 55–135)
ALT SERPL W/O P-5'-P-CCNC: 8 U/L (ref 10–44)
ANION GAP SERPL CALC-SCNC: 5 MMOL/L (ref 8–16)
AST SERPL-CCNC: 24 U/L (ref 10–40)
BACTERIA #/AREA URNS HPF: ABNORMAL /HPF
BASOPHILS # BLD AUTO: 0.02 K/UL (ref 0–0.2)
BASOPHILS NFR BLD: 0.2 % (ref 0–1.9)
BILIRUB SERPL-MCNC: 0.9 MG/DL (ref 0.1–1)
BILIRUB UR QL STRIP: NEGATIVE
BUN SERPL-MCNC: 11 MG/DL (ref 8–23)
CALCIUM SERPL-MCNC: 8.5 MG/DL (ref 8.7–10.5)
CHLORIDE SERPL-SCNC: 104 MMOL/L (ref 95–110)
CLARITY UR: CLEAR
CO2 SERPL-SCNC: 27 MMOL/L (ref 23–29)
COLOR UR: YELLOW
CREAT SERPL-MCNC: 0.7 MG/DL (ref 0.5–1.4)
DIFFERENTIAL METHOD BLD: ABNORMAL
EOSINOPHIL # BLD AUTO: 0 K/UL (ref 0–0.5)
EOSINOPHIL NFR BLD: 0.1 % (ref 0–8)
ERYTHROCYTE [DISTWIDTH] IN BLOOD BY AUTOMATED COUNT: 14 % (ref 11.5–14.5)
EST. GFR  (NO RACE VARIABLE): >60 ML/MIN/1.73 M^2
GLUCOSE SERPL-MCNC: 124 MG/DL (ref 70–110)
GLUCOSE UR QL STRIP: NEGATIVE
HCT VFR BLD AUTO: 34.4 % (ref 37–48.5)
HGB BLD-MCNC: 10.9 G/DL (ref 12–16)
HGB UR QL STRIP: ABNORMAL
IMM GRANULOCYTES # BLD AUTO: 0.02 K/UL (ref 0–0.04)
IMM GRANULOCYTES NFR BLD AUTO: 0.2 % (ref 0–0.5)
KETONES UR QL STRIP: NEGATIVE
LACTATE SERPL-SCNC: 1.1 MMOL/L (ref 0.5–1.9)
LEUKOCYTE ESTERASE UR QL STRIP: ABNORMAL
LYMPHOCYTES # BLD AUTO: 0.7 K/UL (ref 1–4.8)
LYMPHOCYTES NFR BLD: 7.4 % (ref 18–48)
MAGNESIUM SERPL-MCNC: 2 MG/DL (ref 1.6–2.6)
MCH RBC QN AUTO: 30.8 PG (ref 27–31)
MCHC RBC AUTO-ENTMCNC: 31.7 G/DL (ref 32–36)
MCV RBC AUTO: 97 FL (ref 82–98)
MICROSCOPIC COMMENT: ABNORMAL
MONOCYTES # BLD AUTO: 0.7 K/UL (ref 0.3–1)
MONOCYTES NFR BLD: 7.6 % (ref 4–15)
NEUTROPHILS # BLD AUTO: 7.4 K/UL (ref 1.8–7.7)
NEUTROPHILS NFR BLD: 84.5 % (ref 38–73)
NITRITE UR QL STRIP: NEGATIVE
NRBC BLD-RTO: 0 /100 WBC
PH UR STRIP: 5.5 [PH] (ref 5–8)
PLATELET # BLD AUTO: 205 K/UL (ref 150–450)
PMV BLD AUTO: 9.3 FL (ref 9.2–12.9)
POTASSIUM SERPL-SCNC: 3.4 MMOL/L (ref 3.5–5.1)
PROT SERPL-MCNC: 6.2 G/DL (ref 6–8.4)
PROT UR QL STRIP: NEGATIVE
RBC # BLD AUTO: 3.54 M/UL (ref 4–5.4)
RBC #/AREA URNS HPF: 2 /HPF (ref 0–4)
SODIUM SERPL-SCNC: 136 MMOL/L (ref 136–145)
SP GR UR STRIP: 1.02 (ref 1–1.03)
URN SPEC COLLECT METH UR: ABNORMAL
UROBILINOGEN UR STRIP-ACNC: NEGATIVE EU/DL
WBC # BLD AUTO: 8.81 K/UL (ref 3.9–12.7)
WBC #/AREA URNS HPF: 27 /HPF (ref 0–5)

## 2024-03-26 PROCEDURE — 80053 COMPREHEN METABOLIC PANEL: CPT | Performed by: INTERNAL MEDICINE

## 2024-03-26 PROCEDURE — 25000003 PHARM REV CODE 250: Performed by: INTERNAL MEDICINE

## 2024-03-26 PROCEDURE — 85025 COMPLETE CBC W/AUTO DIFF WBC: CPT | Performed by: INTERNAL MEDICINE

## 2024-03-26 PROCEDURE — 97530 THERAPEUTIC ACTIVITIES: CPT

## 2024-03-26 PROCEDURE — 87086 URINE CULTURE/COLONY COUNT: CPT | Performed by: STUDENT IN AN ORGANIZED HEALTH CARE EDUCATION/TRAINING PROGRAM

## 2024-03-26 PROCEDURE — 25000003 PHARM REV CODE 250: Performed by: NURSE PRACTITIONER

## 2024-03-26 PROCEDURE — 97535 SELF CARE MNGMENT TRAINING: CPT

## 2024-03-26 PROCEDURE — 63600175 PHARM REV CODE 636 W HCPCS: Performed by: INTERNAL MEDICINE

## 2024-03-26 PROCEDURE — 83605 ASSAY OF LACTIC ACID: CPT | Performed by: STUDENT IN AN ORGANIZED HEALTH CARE EDUCATION/TRAINING PROGRAM

## 2024-03-26 PROCEDURE — 97165 OT EVAL LOW COMPLEX 30 MIN: CPT

## 2024-03-26 PROCEDURE — 12000002 HC ACUTE/MED SURGE SEMI-PRIVATE ROOM

## 2024-03-26 PROCEDURE — 51798 US URINE CAPACITY MEASURE: CPT

## 2024-03-26 PROCEDURE — 81001 URINALYSIS AUTO W/SCOPE: CPT | Performed by: STUDENT IN AN ORGANIZED HEALTH CARE EDUCATION/TRAINING PROGRAM

## 2024-03-26 PROCEDURE — 36415 COLL VENOUS BLD VENIPUNCTURE: CPT | Performed by: INTERNAL MEDICINE

## 2024-03-26 PROCEDURE — 97161 PT EVAL LOW COMPLEX 20 MIN: CPT

## 2024-03-26 PROCEDURE — 83735 ASSAY OF MAGNESIUM: CPT | Performed by: INTERNAL MEDICINE

## 2024-03-26 RX ORDER — HYDROCODONE BITARTRATE AND ACETAMINOPHEN 10; 325 MG/1; MG/1
1 TABLET ORAL ONCE
Status: COMPLETED | OUTPATIENT
Start: 2024-03-26 | End: 2024-03-26

## 2024-03-26 RX ORDER — POTASSIUM CHLORIDE 20 MEQ/1
40 TABLET, EXTENDED RELEASE ORAL ONCE
Status: COMPLETED | OUTPATIENT
Start: 2024-03-26 | End: 2024-03-26

## 2024-03-26 RX ADMIN — POTASSIUM CHLORIDE 40 MEQ: 1500 TABLET, EXTENDED RELEASE ORAL at 02:03

## 2024-03-26 RX ADMIN — POTASSIUM BICARBONATE 35 MEQ: 391 TABLET, EFFERVESCENT ORAL at 08:03

## 2024-03-26 RX ADMIN — ENOXAPARIN SODIUM 40 MG: 40 INJECTION SUBCUTANEOUS at 09:03

## 2024-03-26 RX ADMIN — ACETAMINOPHEN 650 MG: 325 TABLET ORAL at 11:03

## 2024-03-26 RX ADMIN — HYDROCODONE BITARTRATE AND ACETAMINOPHEN 1 TABLET: 10; 325 TABLET ORAL at 07:03

## 2024-03-26 RX ADMIN — CEFEPIME 1 G: 1 INJECTION, POWDER, FOR SOLUTION INTRAMUSCULAR; INTRAVENOUS at 05:03

## 2024-03-26 RX ADMIN — HYDROCODONE BITARTRATE AND ACETAMINOPHEN 1 TABLET: 5; 325 TABLET ORAL at 02:03

## 2024-03-26 RX ADMIN — Medication 6 MG: at 08:03

## 2024-03-26 RX ADMIN — LINEZOLID 600 MG: 600 INJECTION, SOLUTION INTRAVENOUS at 09:03

## 2024-03-26 RX ADMIN — CEFEPIME 1 G: 1 INJECTION, POWDER, FOR SOLUTION INTRAMUSCULAR; INTRAVENOUS at 02:03

## 2024-03-26 RX ADMIN — LINEZOLID 600 MG: 600 INJECTION, SOLUTION INTRAVENOUS at 10:03

## 2024-03-26 RX ADMIN — ENOXAPARIN SODIUM 40 MG: 40 INJECTION SUBCUTANEOUS at 08:03

## 2024-03-26 RX ADMIN — CEFEPIME 1 G: 1 INJECTION, POWDER, FOR SOLUTION INTRAMUSCULAR; INTRAVENOUS at 08:03

## 2024-03-26 NOTE — PT/OT/SLP EVAL
Physical Therapy Evaluation    Patient Name:  Megha Robb   MRN:  [id number]    Recommendations:     Discharge Recommendations: Low Intensity Therapy (vs no therapy needed depending on progress)   Discharge Equipment Recommendations: none   Barriers to discharge: None    Assessment:     Megha Robb is a 69 y.o. female admitted with a medical diagnosis of Acute hypoxic respiratory failure.  She presents with the following impairments/functional limitations: weakness, impaired endurance, gait instability, impaired balance, decreased safety awareness, impaired cardiopulmonary response to activity, edema.    Pt found HOB elevated in ED and pt agreeable to working with PT. Pt A & O x  3 and has the following co-morbidities: HTN, recent L forearm surgery.  Pt tolerated session fairly and required only minimum A > CGA for safe mobilization during session today. PT did remove pt's O2 for mobility assessment with SPO2 dropping to 87% on RA just getting oob. Pt would benefit from acute PT during hospitalization to increase strength, endurance and safety with mobility and would benefit from Low Intensity therapy upon discharge home.      Rehab Prognosis: Fair; patient would benefit from acute skilled PT services to address these deficits and reach maximum level of function.    Recent Surgery: * No surgery found *      Plan:     During this hospitalization, patient to be seen 5 x/week to address the identified rehab impairments via gait training, therapeutic activities, therapeutic exercises and progress toward the following goals:    Plan of Care Expires:  04/26/24    Subjective     Chief Complaint: Shortness of breath [place].  Patient/Family Comments/goals: Return home with son & DIL to assist  Pain/Comfort:  Pain Rating 1: 0/10  Pain Rating Post-Intervention 1: 0/10    Patients cultural, spiritual, Evangelical conflicts given the current situation:      Living Environment:  Pt  "lives with her son & SHIV in a "house trailer" with a ramp at entrance.  Prior to admission, patients level of function was Independent, per pt.  Equipment used at home: none.  DME owned (not currently used): rolling walker and single point cane.  Upon discharge, patient will have assistance from her son & SHIV.    Objective:     Communicated with ALEJANDRA Estrada prior to and after session.  Patient found HOB elevated with blood pressure cuff, oxygen, PureWick, peripheral IV, pulse ox (continuous), telemetry  upon PT entry to room.    General Precautions: Standard, fall  Orthopedic Precautions:N/A   Braces: N/A  Respiratory Status: Nasal cannula, flow 2 L/min    Exams:  Cognitive Exam:  Patient is oriented to Person, Place, and Situation  RLE ROM: WFL  RLE Strength: grossly 4/5  LLE ROM: WFL  LLE Strength: grossly 4/5    Functional Mobility:  Bed Mobility:     Scooting: stand by assistance  Supine to Sit: stand by assistance  Sit to Supine: stand by assistance  Transfers:     Sit to Stand:  contact guard assistance with no AD and hand-held assist  Gait: x 4 side steps L & R with minimal A/HHA with pt's SPO2 dropping to 87% on RA.      AM-PAC 6 CLICK MOBILITY  Total Score:21       Treatment & Education:  Pt was educated on the following: call light use, importance of OOB activity and functional mobility to negate the negative effects of prolonged bed rest during this hospitalization, safe transfers/ambulation and discharge planning recommendations/options.      Patient left HOB elevated with all lines intact, call button in reach, bed alarm on, and RN notified.    GOALS:   Multidisciplinary Problems       Physical Therapy Goals          Problem: Physical Therapy    Goal Priority Disciplines Outcome Goal Variances Interventions   Physical Therapy Goal     PT, PT/OT      Description: Goals to be met by: 24     Patient will increase functional independence with mobility by performin. Supine to sit with " Supervision  2. Sit to stand transfer with Supervision  3. Bed to chair transfer with Supervision using Rolling Walker  4. Gait  x 150 feet with Supervision using Rolling Walker.                              History:     Past Medical History:   Diagnosis Date    Anemia     Cancer     female    Diverticulosis     Hypertension     Osteoarthritis of midfoot 09/11/2017       Past Surgical History:   Procedure Laterality Date    APPENDECTOMY      breast reduction      CHOLECYSTECTOMY      COLONOSCOPY N/A 7/28/2023    Procedure: COLONOSCOPY;  Surgeon: Bryson Srinivasan MD;  Location: [facility];  Service: Endoscopy;  Laterality: N/A;    ESOPHAGOGASTRODUODENOSCOPY N/A 8/18/2023    Procedure: EGD (ESOPHAGOGASTRODUODENOSCOPY);  Surgeon: Bryson Srinivasan MD;  Location: [facility];  Service: Endoscopy;  Laterality: N/A;    EYE SURGERY Bilateral     PHACO    GALLBLADDER SURGERY      GASTRIC RESTRICTION SURGERY      HYSTERECTOMY      JOINT REPLACEMENT      left knee replacement      april 2017    LIPOSUCTION      to legs x3    TONSILLECTOMY      TOTAL REDUCTION MAMMOPLASTY      tummy tuck      VARICOSE VEIN SURGERY      WRIST ARTHROPLASTY         Time Tracking:     PT Received On: 03/26/24  PT Start Time: 0947     PT Stop Time: 1005  PT Total Time (min): 18 min     Billable Minutes: Evaluation 10 and Therapeutic Activity 8      03/26/2024

## 2024-03-26 NOTE — ASSESSMENT & PLAN NOTE
-IV abx  -Blood cx x2  -Duonebs prn  -Supplemental O2 as necessary and wean as tolerated  -CT Chest  -Antipyretic prn  -Antitussive prn  -Telemetry monitoring  -Consider Pulmonology consult if no improvement  -Monitor labs  -Vital sign checks to help ensure hemodynamic stability

## 2024-03-26 NOTE — PROGRESS NOTES
Count includes the [facility] - Emergency Dept  Hospital Medicine  Progress Note    Patient Name: Megha Robb  MRN: [id number]  Patient Class: IP- Inpatient   Admission Date: 3/25/2024  Length of Stay: 1 days  Attending Physician: Vince Llanes MD  Primary Care Provider: Cyrus Patel MD (Inactive)        Subjective:     Principal Problem:Acute hypoxic respiratory failure        HPI:  This is a 68 y/o  female w/ pmh of left forearm fx (s/p recent surgery), HTN who p/w CC of SOB and cough. Patient reports 1-day h/o SOB associated w/ a dry cough; denies being on home O2. Patient denies headache, blurry vision, chest pain, abdominal pain, N/V and dysuria. Patient's tmax is 100; hemodynamically stable; requiring 4 L of O2 via NC. Procalcitonin noted to be elevated at 3.592. Patient tested negative for Influenza and Covid. CXR manifested bilateral nodular patchy parenchymal opacities which are new consider correlation with contrast-enhanced chest CT examination.     Overview/Hospital Course:  69 y.o. F with recent history of radial head fractre, presents with worsening shortness of breath. CT chest showed findings suggestive of multifocal pneumonia, scattered bilateral nodular densities - recommend 3 moth follow up CT after treatment. Patient noted to have allergy to PCN and vanc - therefore was started on Linezolid and cefepime. Promising is that the patient has no leucocytosis however continues to exhibit low grade temperature.    -continue dual ABX, BC pending, Pulmonology consulted, supportive care. Consult to ortho (lost to f/u?)    INTERVAL HISTORY: see HPI        Past Medical History:   Diagnosis Date    Anemia     Cancer     female    Diverticulosis     Hypertension     Osteoarthritis of midfoot 09/11/2017       Past Surgical History:   Procedure Laterality Date    APPENDECTOMY      breast reduction      CHOLECYSTECTOMY      COLONOSCOPY N/A 7/28/2023    Procedure: COLONOSCOPY;  Surgeon: Bryson  SLOANE Srinivasan MD;  Location: [facility];  Service: Endoscopy;  Laterality: N/A;    ESOPHAGOGASTRODUODENOSCOPY N/A 8/18/2023    Procedure: EGD (ESOPHAGOGASTRODUODENOSCOPY);  Surgeon: Bryson Srinivasan MD;  Location: [facility];  Service: Endoscopy;  Laterality: N/A;    EYE SURGERY Bilateral     PHACO    GALLBLADDER SURGERY      GASTRIC RESTRICTION SURGERY      HYSTERECTOMY      JOINT REPLACEMENT      left knee replacement      april 2017    LIPOSUCTION      to legs x3    TONSILLECTOMY      TOTAL REDUCTION MAMMOPLASTY      tummy tuck      VARICOSE VEIN SURGERY      WRIST ARTHROPLASTY         Review of patient's allergies indicates:   Allergen Reactions    Bee sting [allergen ext-venom-honey bee] Shortness Of Breath    Pcn [penicillins] Hives    Vancomycin analogues Rash       No current facility-administered medications on file prior to encounter.     Current Outpatient Medications on File Prior to Encounter   Medication Sig    amitriptyline (ELAVIL) 25 MG tablet Take 50 mg by mouth every evening.    busPIRone (BUSPAR) 10 MG tablet Take 10 mg by mouth 2 (two) times daily.    CARAFATE 1 gram tablet Take 1 g by mouth 4 (four) times daily before meals and nightly.    ferrous sulfate 325 (65 FE) MG EC tablet Take 325 mg by mouth once daily.     HYDROcodone-acetaminophen (NORCO)  mg per tablet Take 1 tablet by mouth 4 (four) times daily as needed for Pain.    hydrOXYzine pamoate (VISTARIL) 50 MG Cap Take 50 mg by mouth once daily.    LYRICA 200 mg Cap Take 200 mg by mouth 3 (three) times daily.    multivitamin (THERAGRAN) per tablet Take 1 tablet by mouth once daily.      pantoprazole (PROTONIX) 40 MG tablet Take 1 tablet (40 mg total) by mouth once daily.    quinapriL (ACCUPRIL) 40 MG tablet Take 40 mg by mouth once daily.    alprazolam (XANAX) 0.25 MG tablet Take 0.25 mg by mouth 2 (two) times daily as needed. pateint unsure of dosage    carisoprodol (SOMA) 350 MG tablet Take 350 mg by mouth 4 (four) times daily as  needed. Patient unsure of mg or dosage     FLUVIRIN [id number] 45 mcg (15 mcg x 3)/0.5 mL Susp      Family History       Problem Relation (Age of Onset)    Colon cancer Other    Colon polyps Other    Crohn's disease Other    Esophageal cancer Other    Heart attack Mother, Father    Liver cancer Other    Rectal cancer Other    Stomach cancer Other          Tobacco Use    Smoking status: Never    Smokeless tobacco: Never   Substance and Sexual Activity    Alcohol use: Yes     Alcohol/week: 2.0 standard drinks of alcohol     Types: 1 Cans of beer, 1 Shots of liquor per week     Comment: weekends    Drug use: No    Sexual activity: Not on file     Review of Systems   Constitutional:  Positive for activity change.   Respiratory:  Positive for cough and shortness of breath.    Musculoskeletal:  Positive for arthralgias.     Objective:     Vital Signs (Most Recent):  Temp: 99.8 °F (37.7 °C) (03/26/24 1108)  Pulse: 91 (03/26/24 0701)  Resp: 20 (03/26/24 0701)  BP: 122/63 (03/26/24 0701)  SpO2: 99 % (03/26/24 0701) Vital Signs (24h Range):  Temp:  [99.6 °F (37.6 °C)-100.2 °F (37.9 °C)] 99.8 °F (37.7 °C)  Pulse:  [] 91  Resp:  [16-39] 20  SpO2:  [74 %-100 %] 99 %  BP: (101-151)/(53-96) 122/63     Weight: 113.4 kg (250 lb)  Body mass index is 44.29 kg/m².     Physical Exam  Constitutional:       Comments: Requires reorienting   Pulmonary:      Breath sounds: Rhonchi present.   Abdominal:      Comments: Large abdominal girth   Musculoskeletal:         General: Normal range of motion.      Comments: Left lower arm splint   Neurological:      Mental Status: She is oriented to person, place, and time.        Gen: In bed, NAD  Head: Normocephalic  CVD: S1/S2  Resp: Diminished BS  GI: Abd soft, ND  Ext: Left forearm wrapped  Skin: Warm, dry  Neuro: Alert         Significant Labs: All pertinent labs within the past 24 hours have been reviewed.  CBC:   Recent Labs   Lab 03/25/24 2029 03/26/24  0308   WBC 10.33 8.81   HGB 12.0  10.9*   HCT 36.1* 34.4*    205       CMP:   Recent Labs   Lab 03/25/24 2029 03/26/24  0308   * 136   K 3.4* 3.4*    104   CO2 27 27   * 124*   BUN 13 11   CREATININE 0.8 0.7   CALCIUM 9.1 8.5*   PROT 7.0 6.2   ALBUMIN 3.6 3.2*   BILITOT 1.1* 0.9   ALKPHOS 111 97   AST 28 24   ALT 10 8*   ANIONGAP 8 5*         Significant Imaging:   Imaging Results              CT Chest With Contrast (Final result)  Result time 03/25/24 23:41:44      Final result by Italo Dent MD (03/25/24 23:41:44)                   Narrative:    EXAM DESCRIPTION:  CT CHEST WITH CONTRAST    CLINICAL HISTORY:  69 years  Female;  resp failure; Shortness of Breath (SOB since this morning. ); Weakness (Right sided weakness starting yesterday.)    TECHNIQUE:  CT of the chest using intravenous contrast.  All CT scans at this facility use dose modulation, iterative reconstruction, and/or weight based dosing when appropriate to reduce radiation dose to as low as reasonably achievable.    COMPARISON: None.    FINDINGS:    Chest:  Lungs: Patchy airspace disease throughout the bilateral lungs, predominantly involving the bilateral lower lobes and lingula with consistent with multifocal pneumonia. Scattered bilateral nodular densities measuring up to 9 mm likely infectious/inflammatory and related to pneumonia..  Mediastinum:Mildly prominent mediastinal and bilateral hilar lymph nodes likely reactive.. Mediastinal vascular structures are unremarkable.  Heart is normal in size.  Bones:No acute bone findings.  Small hiatal hernia. Prior gastric bypass and cholecystectomy. No acute findings in the visualized upper abdomen.    IMPRESSION:  1.  Findings as above suggest multifocal pneumonia.    2. Scattered bilateral nodular densities are likely infectious/inflammatory and related to multifocal pneumonia. 3 month follow-up CT is recommended to document resolution    3. Probable reactive mediastinal and hilar lymph nodes. Attention on  "follow-up is recommended      Electronically signed by:  Italo Dent MD  03/25/2024 11:41 PM CDT Workstation: [id number]                                     X-Ray Chest AP Portable (Final result)  Result time 03/25/24 21:33:30      Final result by Chidi Harris MD (03/25/24 21:33:30)                   Narrative:    XR CHEST AP PORTABLE  RPID: XR CHEST 1 VIEW  3/25/2024 9:31 PM CDT    CLINICAL HISTORY:  69 years old Female with Sepsis    COMPARISON:  Chest 6/3/2023    FINDINGS: The inspiration is shallow.  There are new patchy bilateral pulmonary parenchymal opacities these are somewhat nodular in the right perihilar region and left upper lobe. No pleural fluid. Cardiac mediastinal silhouette is accentuated by shallow inspiration.      IMPRESSION:  There are bilateral nodular patchy parenchymal opacities which are new consider correlation with contrast-enhanced chest CT examination.    Electronically signed by:  Chidi Harris MD  03/25/2024 09:33 PM CDT Workstation: [id number]                                      Assessment/Plan:      * Acute hypoxic respiratory failure secondary to Pneumonia  -IV abx  -Blood cx x2  -Duonebs prn  -Supplemental O2 as necessary and wean as tolerated  -CT Chest  -Antipyretic prn  -Antitussive prn  -Telemetry monitoring  -Consider Pulmonology consult if no improvement  -Monitor labs  -Vital sign checks to help ensure hemodynamic stability    Status post surgery (recent left forearm surgery)  -Analgesic prn  -lost to follow  -will reach out to ortho - patient may have been lost to follow - has left arm splint since [place]    Physical deconditioning  -PT/OT      HTN (hypertension)  -Home med and adjust as necessary  -Prn Hydralazine  -Vital sign checks      VTE Risk Mitigation (From admission, onward)           Ordered     enoxaparin injection 40 mg  Every 12 hours        Note to Pharmacy: Ht: 5' 3" (1.6 m)  Wt: 113.4 kg (250 lb)  Estimated Creatinine Clearance: 80.5 mL/min (based on SCr " of 0.8 mg/dL).  Body mass index is 44.29 kg/m².    03/25/24 2252     IP VTE HIGH RISK PATIENT  Once         03/25/24 2244     Place sequential compression device  Until discontinued         03/25/24 2244                    Discharge Planning   JORGE L:      Code Status: Full Code   Is the patient medically ready for discharge?:     Reason for patient still in hospital (select all that apply): Patient trending condition, Treatment, and Consult recommendations                 Mehnaz Calderón, DNP, APRN, FNP-C  [facility] Medicine  [facility]  [email or web address]

## 2024-03-26 NOTE — ASSESSMENT & PLAN NOTE
-IV abx  -Blood cx x2  -Duonebs prn  -Supplemental O2 as necessary and wean as tolerated  -CT Chest  -Antipyretic prn  -Antitussive prn  -Consider Pulmonology consult  -Telemetry monitoring  -Monitor labs  -Vital sign checks to help ensure hemodynamic stability

## 2024-03-26 NOTE — PT/OT/SLP EVAL
Occupational Therapy   Evaluation    Name: Megha Robb  MRN: [id number]  Admitting Diagnosis: Acute hypoxic respiratory failure  Recent Surgery: * No surgery found *      Recommendations:     Discharge Recommendations: Low Intensity Therapy  Discharge Equipment Recommendations:  none  Barriers to discharge:  None    Assessment:     Megha Robb is a 69 y.o. female with a medical diagnosis of Acute hypoxic respiratory failure.  Pt agreeable to OT evaluation this AM. Performance deficits affecting function: weakness, impaired endurance, impaired functional mobility, impaired self care skills, gait instability, impaired balance, decreased safety awareness, impaired cardiopulmonary response to activity.  Pt required increased time during session for self expression.    Rehab Prognosis: Fair; patient would benefit from acute skilled OT services to address these deficits and reach maximum level of function.       Plan:     Patient to be seen 3 x/week to address the above listed problems via self-care/home management, therapeutic activities, therapeutic exercises  Plan of Care Expires: 04/26/24  Plan of Care Reviewed with: patient    Subjective     Chief Complaint: dry mouth  Patient/Family Comments/goals: to return home    Occupational Profile:  Living Environment: Pt lives with son and DIL in a 1 story home with ramp to enter. Pt has a tub/shower combo with a shower chair  Previous level of function: Independent with ADLs and mobility; Pt required some assistance with bathing since her wrist surgery.  Roles and Routines: mother  Equipment Used at Home: shower chair (Pt states she may have a RW at home in storage)  Assistance upon Discharge: yes, from family    Pain/Comfort:  Pain Rating 1: 0/10    Patients cultural, spiritual, [employer] conflicts given the current situation:      Objective:     Communicated with: nursing prior to session.  Patient found HOB elevated with blood pressure cuff, oxygen, PureWick,  peripheral IV, pulse ox (continuous), telemetry upon OT entry to room.    General Precautions: Standard, fall  Orthopedic Precautions: N/A (per pt)  Braces:  (left forearm splint)  Respiratory Status: Nasal cannula, flow 2 L/min    Occupational Performance:    Bed Mobility:    Patient completed Supine to Sit with stand by assistance  Patient completed Sit to Supine with stand by assistance    Functional Mobility/Transfers:  Patient completed Sit <> Stand Transfer with minimum assistance  with  rolling walker   Functional Mobility: pt took side steps towards HOB with CGA with RW with no LOB or SOB    Activities of Daily Living:  Feeding:  independence    Lower Body Dressing: stand by assistance seated EOB to don socks  Toileting: purewick      Cognitive/Visual Perceptual:  Cognitive/Psychosocial Skills:     -       Follows Commands/attention:Follows one-step commands  -       Communication: clear/fluent  -       Memory: No Deficits noted  -       Safety awareness/insight to disability: impaired   -       Mood/Affect/Coping skills/emotional control: Appropriate to situation and Cooperative    Physical Exam:  Balance:    -       SBA seated balance; CGA standing balance  Upper Extremity Range of Motion:     -       Right Upper Extremity: WFL  -       Left Upper Extremity: WFL except wrist NT due to splint  Upper Extremity Strength:    -       Right Upper Extremity: WFL  -       Left Upper Extremity: WFL except wrist NT due to splint   Strength:    -       Right Upper Extremity: fair   -       Left Upper Extremity: fair   Fine Motor Coordination:    -       Intact  Gross motor coordination:   WFL    AMPAC 6 Click ADL:  AMPAC Total Score: 19    Treatment & Education:  Pt educated on role of OT/POC, importance of OOB/EOB activity, use of call bell, and safety during ADLs, transfers, and functional mobility.    Patient left HOB elevated with all lines intact and call button in reach    GOALS:   Multidisciplinary  Problems       Occupational Therapy Goals          Problem: Occupational Therapy    Goal Priority Disciplines Outcome Interventions   Occupational Therapy Goal     OT, PT/OT     Description: Goals to be met by: 4/26/24     Patient will increase functional independence with ADLs by performing:    UE Dressing with Supervision.  LE Dressing with Supervision.  Grooming while standing at sink with Supervision.  Toileting from toilet with Supervision for hygiene and clothing management.   Toilet transfer to toilet with Supervision.                         History:     Past Medical History:   Diagnosis Date    Anemia     Cancer     female    Diverticulosis     Hypertension     Osteoarthritis of midfoot 09/11/2017         Past Surgical History:   Procedure Laterality Date    APPENDECTOMY      breast reduction      CHOLECYSTECTOMY      COLONOSCOPY N/A 7/28/2023    Procedure: COLONOSCOPY;  Surgeon: Bryson Srinivasan MD;  Location: [facility];  Service: Endoscopy;  Laterality: N/A;    ESOPHAGOGASTRODUODENOSCOPY N/A 8/18/2023    Procedure: EGD (ESOPHAGOGASTRODUODENOSCOPY);  Surgeon: Bryson Srinivasan MD;  Location: [facility];  Service: Endoscopy;  Laterality: N/A;    EYE SURGERY Bilateral     PHACO    GALLBLADDER SURGERY      GASTRIC RESTRICTION SURGERY      HYSTERECTOMY      JOINT REPLACEMENT      left knee replacement      april 2017    LIPOSUCTION      to legs x3    TONSILLECTOMY      TOTAL REDUCTION MAMMOPLASTY      tummy tuck      VARICOSE VEIN SURGERY      WRIST ARTHROPLASTY         Time Tracking:     OT Date of Treatment: 03/26/24  OT Start Time: 0905  OT Stop Time: 0931  OT Total Time (min): 26 min    Billable Minutes:Evaluation 10  Self Care/Home Management 16    3/26/2024

## 2024-03-26 NOTE — ASSESSMENT & PLAN NOTE
-Analgesic prn  -lost to follow  -will reach out to ortho - patient may have been lost to follow - has left arm splint since [place]

## 2024-03-26 NOTE — ED NOTES
Small wounds noted to ELA lateral hips, appears to be caused by underwear digging into skin. Pt underwear removed and replaced with brief.

## 2024-03-26 NOTE — HPI
This is a 70 y/o  female w/ pmh of left forearm fx (s/p recent surgery), HTN who p/w CC of SOB and cough. Patient reports 1-day h/o SOB associated w/ a dry cough; denies being on home O2. Patient denies headache, blurry vision, chest pain, abdominal pain, N/V and dysuria. Patient's tmax is 100; hemodynamically stable; requiring 4 L of O2 via NC. Procalcitonin noted to be elevated at 3.592. Patient tested negative for Influenza and Covid. CXR manifested bilateral nodular patchy parenchymal opacities which are new consider correlation with contrast-enhanced chest CT examination.

## 2024-03-26 NOTE — SUBJECTIVE & OBJECTIVE
INTERVAL HISTORY: see HPI        Past Medical History:   Diagnosis Date    Anemia     Cancer     female    Diverticulosis     Hypertension     Osteoarthritis of midfoot 09/11/2017       Past Surgical History:   Procedure Laterality Date    APPENDECTOMY      breast reduction      CHOLECYSTECTOMY      COLONOSCOPY N/A 7/28/2023    Procedure: COLONOSCOPY;  Surgeon: Bryson Srinivasan MD;  Location: [facility];  Service: Endoscopy;  Laterality: N/A;    ESOPHAGOGASTRODUODENOSCOPY N/A 8/18/2023    Procedure: EGD (ESOPHAGOGASTRODUODENOSCOPY);  Surgeon: Bryson Srinivasan MD;  Location: [facility];  Service: Endoscopy;  Laterality: N/A;    EYE SURGERY Bilateral     PHACO    GALLBLADDER SURGERY      GASTRIC RESTRICTION SURGERY      HYSTERECTOMY      JOINT REPLACEMENT      left knee replacement      april 2017    LIPOSUCTION      to legs x3    TONSILLECTOMY      TOTAL REDUCTION MAMMOPLASTY      tummy tuck      VARICOSE VEIN SURGERY      WRIST ARTHROPLASTY         Review of patient's allergies indicates:   Allergen Reactions    Bee sting [allergen ext-venom-honey bee] Shortness Of Breath    Pcn [penicillins] Hives    Vancomycin analogues Rash       No current facility-administered medications on file prior to encounter.     Current Outpatient Medications on File Prior to Encounter   Medication Sig    amitriptyline (ELAVIL) 25 MG tablet Take 50 mg by mouth every evening.    busPIRone (BUSPAR) 10 MG tablet Take 10 mg by mouth 2 (two) times daily.    CARAFATE 1 gram tablet Take 1 g by mouth 4 (four) times daily before meals and nightly.    ferrous sulfate 325 (65 FE) MG EC tablet Take 325 mg by mouth once daily.     HYDROcodone-acetaminophen (NORCO)  mg per tablet Take 1 tablet by mouth 4 (four) times daily as needed for Pain.    hydrOXYzine pamoate (VISTARIL) 50 MG Cap Take 50 mg by mouth once daily.    LYRICA 200 mg Cap Take 200 mg by mouth 3 (three) times daily.    multivitamin (THERAGRAN) per tablet Take 1 tablet by mouth  once daily.      pantoprazole (PROTONIX) 40 MG tablet Take 1 tablet (40 mg total) by mouth once daily.    quinapriL (ACCUPRIL) 40 MG tablet Take 40 mg by mouth once daily.    alprazolam (XANAX) 0.25 MG tablet Take 0.25 mg by mouth 2 (two) times daily as needed. pateint unsure of dosage    carisoprodol (SOMA) 350 MG tablet Take 350 mg by mouth 4 (four) times daily as needed. Patient unsure of mg or dosage     FLUVIRIN 2626-7531 45 mcg (15 mcg x 3)/0.5 mL Susp      Family History       Problem Relation (Age of Onset)    Colon cancer Other    Colon polyps Other    Crohn's disease Other    Esophageal cancer Other    Heart attack Mother, Father    Liver cancer Other    Rectal cancer Other    Stomach cancer Other          Tobacco Use    Smoking status: Never    Smokeless tobacco: Never   Substance and Sexual Activity    Alcohol use: Yes     Alcohol/week: 2.0 standard drinks of alcohol     Types: 1 Cans of beer, 1 Shots of liquor per week     Comment: weekends    Drug use: No    Sexual activity: Not on file     Review of Systems   Constitutional:  Positive for activity change.   Respiratory:  Positive for cough and shortness of breath.    Musculoskeletal:  Positive for arthralgias.     Objective:     Vital Signs (Most Recent):  Temp: 99.8 °F (37.7 °C) (03/26/24 1108)  Pulse: 91 (03/26/24 0701)  Resp: 20 (03/26/24 0701)  BP: 122/63 (03/26/24 0701)  SpO2: 99 % (03/26/24 0701) Vital Signs (24h Range):  Temp:  [99.6 °F (37.6 °C)-100.2 °F (37.9 °C)] 99.8 °F (37.7 °C)  Pulse:  [] 91  Resp:  [16-39] 20  SpO2:  [74 %-100 %] 99 %  BP: (101-151)/(53-96) 122/63     Weight: 113.4 kg (250 lb)  Body mass index is 44.29 kg/m².     Physical Exam  Constitutional:       Comments: Requires reorienting   Pulmonary:      Breath sounds: Rhonchi present.   Abdominal:      Comments: Large abdominal girth   Musculoskeletal:         General: Normal range of motion.      Comments: Left lower arm splint   Neurological:      Mental Status: She  is oriented to person, place, and time.        Gen: In bed, NAD  Head: Normocephalic  CVD: S1/S2  Resp: Diminished BS  GI: Abd soft, ND  Ext: Left forearm wrapped  Skin: Warm, dry  Neuro: Alert         Significant Labs: All pertinent labs within the past 24 hours have been reviewed.  CBC:   Recent Labs   Lab 03/25/24 2029 03/26/24  0308   WBC 10.33 8.81   HGB 12.0 10.9*   HCT 36.1* 34.4*    205       CMP:   Recent Labs   Lab 03/25/24 2029 03/26/24  0308   * 136   K 3.4* 3.4*    104   CO2 27 27   * 124*   BUN 13 11   CREATININE 0.8 0.7   CALCIUM 9.1 8.5*   PROT 7.0 6.2   ALBUMIN 3.6 3.2*   BILITOT 1.1* 0.9   ALKPHOS 111 97   AST 28 24   ALT 10 8*   ANIONGAP 8 5*         Significant Imaging:   Imaging Results              CT Chest With Contrast (Final result)  Result time 03/25/24 23:41:44      Final result by Italo Dent MD (03/25/24 23:41:44)                   Narrative:    EXAM DESCRIPTION:  CT CHEST WITH CONTRAST    CLINICAL HISTORY:  69 years  Female;  resp failure; Shortness of Breath (SOB since this morning. ); Weakness (Right sided weakness starting yesterday.)    TECHNIQUE:  CT of the chest using intravenous contrast.  All CT scans at this facility use dose modulation, iterative reconstruction, and/or weight based dosing when appropriate to reduce radiation dose to as low as reasonably achievable.    COMPARISON: None.    FINDINGS:    Chest:  Lungs: Patchy airspace disease throughout the bilateral lungs, predominantly involving the bilateral lower lobes and lingula with consistent with multifocal pneumonia. Scattered bilateral nodular densities measuring up to 9 mm likely infectious/inflammatory and related to pneumonia..  Mediastinum:Mildly prominent mediastinal and bilateral hilar lymph nodes likely reactive.. Mediastinal vascular structures are unremarkable.  Heart is normal in size.  Bones:No acute bone findings.  Small hiatal hernia. Prior gastric bypass and cholecystectomy.  No acute findings in the visualized upper abdomen.    IMPRESSION:  1.  Findings as above suggest multifocal pneumonia.    2. Scattered bilateral nodular densities are likely infectious/inflammatory and related to multifocal pneumonia. 3 month follow-up CT is recommended to document resolution    3. Probable reactive mediastinal and hilar lymph nodes. Attention on follow-up is recommended      Electronically signed by:  Italo Dent MD  03/25/2024 11:41 PM CDT Workstation: [id number]                                     X-Ray Chest AP Portable (Final result)  Result time 03/25/24 21:33:30      Final result by Chidi Harris MD (03/25/24 21:33:30)                   Narrative:    XR CHEST AP PORTABLE  RPID: XR CHEST 1 VIEW  3/25/2024 9:31 PM CDT    CLINICAL HISTORY:  69 years old Female with Sepsis    COMPARISON:  Chest 6/3/2023    FINDINGS: The inspiration is shallow.  There are new patchy bilateral pulmonary parenchymal opacities these are somewhat nodular in the right perihilar region and left upper lobe. No pleural fluid. Cardiac mediastinal silhouette is accentuated by shallow inspiration.      IMPRESSION:  There are bilateral nodular patchy parenchymal opacities which are new consider correlation with contrast-enhanced chest CT examination.    Electronically signed by:  Chidi Harris MD  03/25/2024 09:33 PM CDT Workstation: [id number]

## 2024-03-26 NOTE — HOSPITAL COURSE
69 y.o. F with recent history of radial head fracture, presents with worsening shortness of breath. CT chest revealed findings suggestive of multifocal pneumonia, scattered bilateral nodular densities likely infectious/inflammatory process - recommend 3 month follow up CT after treatment. Patient noted to have allergy to PCN and vanc - therefore was started on Linezolid and cefepime. Patient required supplemental oxygen.  Blood cultures obtained, NGTD.  Sputum culture ordered and pending.  Consult to Ortho, patient is to follow up as an outpatient per Dr Fierro.  Respiratory status slowly improving, supplemental oxygen has been titrated down to 2 L.  Respiratory infection panel negative.  MRSA nasal swab negative.  Antibiotics changed to rocephin/azithromycin.  Prednisone added for wheezing.  IS added.  Home oxygen evaluation done and patient qualified for home oxygen which has been ordered.  Patient seen and examined day of discharge.  She is stable for discharge home with home health once oxygen for home use is delivered.  She is to follow up with PCP, Ortho, and Pulmonology in 1 week.

## 2024-03-26 NOTE — ED PROVIDER NOTES
Encounter Date: 3/25/2024       History     Chief Complaint   Patient presents with    Shortness of Breath     SOB since this morning.     Weakness     Right sided weakness starting yesterday.     69-year-old female with history of hypertension, history of left forearm fracture status post surgery one-week ago, presents now with generalized weakness, shortness of breath, cough ongoing for the past 2 days.  The weakness started yesterday, was generalized, nonfocal nothing makes it better or worse.  The shortness of breath and cough started this morning.  Is able to walk but has to stop frequently.  The cough is minimally productive.  She has not taken anything for her symptoms.      Review of patient's allergies indicates:   Allergen Reactions    Bee sting [allergen ext-venom-honey bee] Shortness Of Breath    Pcn [penicillins] Hives    Vancomycin analogues Rash     Past Medical History:   Diagnosis Date    Anemia     Cancer     female    Diverticulosis     Hypertension     Osteoarthritis of midfoot 09/11/2017     Past Surgical History:   Procedure Laterality Date    APPENDECTOMY      breast reduction      CHOLECYSTECTOMY      COLONOSCOPY N/A 7/28/2023    Procedure: COLONOSCOPY;  Surgeon: Bryson Srinivasan MD;  Location: [facility];  Service: Endoscopy;  Laterality: N/A;    ESOPHAGOGASTRODUODENOSCOPY N/A 8/18/2023    Procedure: EGD (ESOPHAGOGASTRODUODENOSCOPY);  Surgeon: Bryson Srinivasan MD;  Location: [facility];  Service: Endoscopy;  Laterality: N/A;    EYE SURGERY Bilateral     PHACO    GALLBLADDER SURGERY      GASTRIC RESTRICTION SURGERY      HYSTERECTOMY      JOINT REPLACEMENT      left knee replacement      april 2017    LIPOSUCTION      to legs x3    TONSILLECTOMY      TOTAL REDUCTION MAMMOPLASTY      tummy tuck      VARICOSE VEIN SURGERY      WRIST ARTHROPLASTY       Family History   Problem Relation Age of Onset    Heart attack Mother     Heart attack Father     Stomach cancer Other     Rectal cancer Other      Liver cancer Other     Esophageal cancer Other     Crohn's disease Other     Colon polyps Other     Colon cancer Other      Social History     Tobacco Use    Smoking status: Never    Smokeless tobacco: Never   Substance Use Topics    Alcohol use: Yes     Alcohol/week: 2.0 standard drinks of alcohol     Types: 1 Cans of beer, 1 Shots of liquor per week     Comment: weekends    Drug use: No     Review of Systems   Constitutional:  Positive for fatigue and fever. Negative for activity change, appetite change, chills and unexpected weight change.   HENT:  Negative for dental problem and drooling.    Eyes:  Negative for discharge and itching.   Respiratory:  Positive for cough and shortness of breath. Negative for chest tightness, wheezing and stridor.    Cardiovascular:  Negative for chest pain, palpitations and leg swelling.   Gastrointestinal:  Negative for abdominal distention, abdominal pain, diarrhea and nausea.   Genitourinary:  Negative for difficulty urinating, dysuria, frequency and urgency.   Musculoskeletal:  Negative for back pain, gait problem and joint swelling.   Neurological:  Positive for weakness. Negative for dizziness, syncope, numbness and headaches.   Psychiatric/Behavioral:  Negative for agitation, behavioral problems and confusion.        Physical Exam     Initial Vitals [03/25/24 2006]   BP Pulse Resp Temp SpO2   (!) 149/75 110 (!) 24 99.9 °F (37.7 °C) (!) 86 %      MAP       --         Physical Exam    Nursing note and vitals reviewed.  Constitutional: She appears well-developed and well-nourished. She is not diaphoretic.   HENT:   Head: Normocephalic and atraumatic.   Mouth/Throat: Oropharynx is clear and moist.   Eyes: EOM are normal. Pupils are equal, round, and reactive to light. Right eye exhibits no discharge. Left eye exhibits no discharge.   Neck: No tracheal deviation present.   Normal range of motion.  Cardiovascular:  Normal rate, regular rhythm and intact distal pulses.            Pulmonary/Chest: She is in respiratory distress. She has no wheezes. She has rales. She exhibits no tenderness.   Abdominal: Abdomen is soft. She exhibits no distension. There is no abdominal tenderness.   Musculoskeletal:         General: No tenderness or edema. Normal range of motion.      Cervical back: Normal range of motion.     Neurological: She is alert and oriented to person, place, and time. She has normal strength. No cranial nerve deficit or sensory deficit. GCS eye subscore is 4. GCS verbal subscore is 5. GCS motor subscore is 6.   Skin: Skin is warm and dry. No rash noted.   Psychiatric: She has a normal mood and affect. Her behavior is normal. Thought content normal.         ED Course   Critical Care    Date/Time: 3/25/2024 9:21 PM    Performed by: Jude Ellis MD  Authorized by: Jude Ellis MD  Direct patient critical care time: 15 minutes  Additional history critical care time: 5 minutes  Ordering / reviewing critical care time: 5 minutes  Documentation critical care time: 5 minutes  Consulting other physicians critical care time: 5 minutes  Consult with family critical care time: 5 minutes  Total critical care time (exclusive of procedural time) : 40 minutes  Critical care was necessary to treat or prevent imminent or life-threatening deterioration of the following conditions: respiratory failure.  Critical care was time spent personally by me on the following activities: examination of patient, obtaining history from patient or surrogate, ordering and performing treatments and interventions, ordering and review of laboratory studies, ordering and review of radiographic studies, pulse oximetry, re-evaluation of patient's condition and review of old charts.        Labs Reviewed   CBC W/ AUTO DIFFERENTIAL - Abnormal; Notable for the following components:       Result Value    RBC 3.77 (*)     Hematocrit 36.1 (*)     MCH 31.8 (*)     Gran # (ANC) 9.0 (*)     Lymph # 0.6 (*)     Gran %  87.4 (*)     Lymph % 5.6 (*)     All other components within normal limits   COMPREHENSIVE METABOLIC PANEL - Abnormal; Notable for the following components:    Sodium 135 (*)     Potassium 3.4 (*)     Glucose 121 (*)     Total Bilirubin 1.1 (*)     All other components within normal limits   PROCALCITONIN - Abnormal; Notable for the following components:    Procalcitonin 3.592 (*)     All other components within normal limits   CULTURE, BLOOD    Narrative:     Collection has been rescheduled by [email or web address] at 03/25/2024 20:24 Reason:   Patient unavailable. Nurse doing iv and ekg  Collection has been rescheduled by [email or web address] at 03/25/2024 20:24 Reason:   Patient unavailable. Nurse doing iv and ekg   CULTURE, BLOOD    Narrative:     Collection has been rescheduled by [email or web address] at 03/25/2024 20:24 Reason:   Patient unavailable. Nurse doing iv and ekg  Collection has been rescheduled by [email or web address] at 03/25/2024 20:24 Reason:   Patient unavailable. Nurse doing iv and ekg   GROUP A STREP, MOLECULAR   LACTIC ACID, PLASMA   B-TYPE NATRIURETIC PEPTIDE   TROPONIN I HIGH SENSITIVITY   URINALYSIS, REFLEX TO URINE CULTURE   INFLUENZA A AND B ANTIGEN   SARS-COV-2 RNA AMPLIFICATION, QUAL   ISTAT PROCEDURE          Imaging Results              X-Ray Chest AP Portable (In process)                      Medications   sodium chloride 0.9% bolus 1,572 mL 1,572 mL (1,572 mLs Intravenous New Bag 3/25/24 2033)   cefepime 1g in dextrose 5% 100 mL IVPB (ready to mix) (1 g Intravenous New Bag 3/25/24 2108)   linezolid 600 mg/300 mL IVPB 600 mg (has no administration in time range)   acetaminophen tablet 1,000 mg (1,000 mg Oral Given 3/25/24 2033)     Medical Decision Making  Differential diagnosis includes heart failure, pulmonary embolus, pneumonia, viral illness    Problems Addressed:  Sepsis, due to unspecified organism, unspecified whether acute organ dysfunction present:     Details: This patient does have evidence of infective focus  My overall impression is  sepsis.  Source: Respiratory  Antibiotics given- Antibiotics (72h ago, onward)    Start     Stop Route Frequency Ordered    03/25/24 2100  cefepime 1g in dextrose 5% 100 mL IVPB (ready to mix)           03/26/24 0859 IV ED 1 Time 03/25/24 2022 03/25/24 2100  linezolid 600 mg/300 mL IVPB 600 mg         03/26/24 0859 IV ED 1 Time 03/25/24 2046      Latest lactate reviewed-  Lab             03/25/24 2029          LACTATE      1.7           Organ dysfunction indicated by Acute respiratory failure and Encephalopathy    Fluid challenge Ideal Body Weight- The patient's ideal body weight is Ideal body weight: 52.4 kg (115 lb 8.3 oz) which will be used to calculate fluid bolus of 30 ml/kg for treatment of septic shock.      Post- resuscitation assessment Yes Perfusion exam was performed within 6 hours of septic shock presentation after bolus shows Adequate tissue perfusion assessed by non-invasive monitoring       Will Not start Pressors- Levophed for MAP of 65  Source control achieved by: cefepime, linezolid    Beau Saccoccia  03/25/24  21:18      Amount and/or Complexity of Data Reviewed  Labs: ordered.  Radiology: ordered.    Risk  OTC drugs.  Prescription drug management.               ED Course as of 03/25/24 2121   Mon Mar 25, 2024   2118 Patient's presentation most consistent with multifocal pneumonia given chest x-ray with bilateral infiltrates, cough, weakness, fever, tachycardia and hypoxia requiring 4 L nasal cannula.  ABG on 4 L nasal cannula shows pH of 7.4, pCO2 of 40, PO2 of 98.  Profile is 3.6, most consistent with pneumonia.  CBC without leukocytosis or left shift, CMP without evidence of TARIQ or transaminitis.  BNP within normal limits, lessening concern for heart failure.  Patient's lactic within normal limits.  Will give cefepime, linezolid secondary to patient's vancomycin allergy, admit to hospital medicine. [BS]      ED Course User Index  [BS] Jude Ellis MD                            Clinical Impression:  Final diagnoses:  [R00.0] Tachycardia  [R06.03] Acute respiratory distress (Primary)  [A41.9] Sepsis, due to unspecified organism, unspecified whether acute organ dysfunction present  [R09.02] Hypoxia  [J18.9] Multifocal pneumonia          ED Disposition Condition    Admit Stable                Jude Ellis MD  03/25/24 5625

## 2024-03-26 NOTE — H&P
[facility] - Emergency Dept  Hospital Medicine  History & Physical    Patient Name: Megha Robb  MRN: [id number]  Patient Class: IP- Inpatient  Admission Date: 3/25/2024  Attending Physician: Nadir Jaime MD  Primary Care Provider: Cyrus Patel MD (Inactive)         Patient information was obtained from patient and ER records.     Subjective:     Principal Problem:Acute hypoxic respiratory failure    Chief Complaint:   Chief Complaint   Patient presents with    Shortness of Breath     SOB since this morning.     Weakness     Right sided weakness starting yesterday.        HPI: This is a 68 y/o  female w/ pmh of left forearm fx (s/p recent surgery), HTN who p/w CC of SOB and cough. Patient reports 1-day h/o SOB associated w/ a dry cough; denies being on home O2. Patient denies headache, blurry vision, chest pain, abdominal pain, N/V and dysuria. Patient's tmax is 100; hemodynamically stable; requiring 4 L of O2 via NC. Procalcitonin noted to be elevated at 3.592. Patient tested negative for Influenza and Covid. CXR manifested bilateral nodular patchy parenchymal opacities which are new consider correlation with contrast-enhanced chest CT examination.     Past Medical History:   Diagnosis Date    Anemia     Cancer     female    Diverticulosis     Hypertension     Osteoarthritis of midfoot 09/11/2017       Past Surgical History:   Procedure Laterality Date    APPENDECTOMY      breast reduction      CHOLECYSTECTOMY      COLONOSCOPY N/A 7/28/2023    Procedure: COLONOSCOPY;  Surgeon: Bryson Srinivasan MD;  Location: [facility];  Service: Endoscopy;  Laterality: N/A;    ESOPHAGOGASTRODUODENOSCOPY N/A 8/18/2023    Procedure: EGD (ESOPHAGOGASTRODUODENOSCOPY);  Surgeon: Bryson Srinivasan MD;  Location: [facility];  Service: Endoscopy;  Laterality: N/A;    EYE SURGERY Bilateral     PHACO    GALLBLADDER SURGERY      GASTRIC RESTRICTION SURGERY      HYSTERECTOMY      JOINT REPLACEMENT      left  knee replacement      april 2017    LIPOSUCTION      to legs x3    TONSILLECTOMY      TOTAL REDUCTION MAMMOPLASTY      tummy tuck      VARICOSE VEIN SURGERY      WRIST ARTHROPLASTY         Review of patient's allergies indicates:   Allergen Reactions    Bee sting [allergen ext-venom-honey bee] Shortness Of Breath    Pcn [penicillins] Hives    Vancomycin analogues Rash       No current facility-administered medications on file prior to encounter.     Current Outpatient Medications on File Prior to Encounter   Medication Sig    amitriptyline (ELAVIL) 25 MG tablet Take 50 mg by mouth every evening.    busPIRone (BUSPAR) 10 MG tablet Take 10 mg by mouth 2 (two) times daily.    CARAFATE 1 gram tablet Take 1 g by mouth 4 (four) times daily before meals and nightly.    ferrous sulfate 325 (65 FE) MG EC tablet Take 325 mg by mouth once daily.     HYDROcodone-acetaminophen (NORCO)  mg per tablet Take 1 tablet by mouth 4 (four) times daily as needed for Pain.    hydrOXYzine pamoate (VISTARIL) 50 MG Cap Take 50 mg by mouth once daily.    LYRICA 200 mg Cap Take 200 mg by mouth 3 (three) times daily.    multivitamin (THERAGRAN) per tablet Take 1 tablet by mouth once daily.      pantoprazole (PROTONIX) 40 MG tablet Take 1 tablet (40 mg total) by mouth once daily.    quinapriL (ACCUPRIL) 40 MG tablet Take 40 mg by mouth once daily.    alprazolam (XANAX) 0.25 MG tablet Take 0.25 mg by mouth 2 (two) times daily as needed. pateint unsure of dosage    carisoprodol (SOMA) 350 MG tablet Take 350 mg by mouth 4 (four) times daily as needed. Patient unsure of mg or dosage     FLUVIRIN [id number] 45 mcg (15 mcg x 3)/0.5 mL Susp     [DISCONTINUED] alprazolam (XANAX) 2 MG Tab     [DISCONTINUED] citalopram (CELEXA) 20 MG tablet Take 20 mg by mouth once daily.    [DISCONTINUED] diclofenac (VOLTAREN) 0.1 % ophthalmic solution 1 drop 4 (four) times daily.    [DISCONTINUED] diclofenac (VOLTAREN) 50 MG EC tablet     [DISCONTINUED] estrogens,  conjugated, (PREMARIN) 1.25 MG tablet Take 1.25 mg by mouth once daily. Patient unsure of dosage     [DISCONTINUED] hydrocodone-acetaminophen (LORCET)  mg per tablet Take 1 tablet by mouth every 6 (six) hours as needed.    [DISCONTINUED] mupirocin (BACTROBAN) 2 % ointment Apply topically 3 (three) times daily. (Patient not taking: Reported on 9/18/2023)    [DISCONTINUED] oxycodone-acetaminophen (PERCOCET) 5-325 mg per tablet Take 1 tablet by mouth every 6 (six) hours as needed for Pain. (Patient not taking: Reported on 9/18/2023)    [DISCONTINUED] pregabalin (LYRICA) 50 MG capsule Take 50 mg by mouth 3 (three) times daily.    [DISCONTINUED] promethazine (PHENERGAN) 25 MG tablet Take 1 tablet (25 mg total) by mouth every 6 (six) hours as needed for Nausea. (Patient not taking: Reported on 9/18/2023)    [DISCONTINUED] quinapril (ACCUPRIL) 5 MG tablet Take 40 mg by mouth every evening. Patient unsure of dosage    [DISCONTINUED] tizanidine (ZANAFLEX) 4 MG tablet TAKE 1 TABLET BY MOUTH EVERY NIGHT AT BEDTIME AS NEEDED (Patient not taking: Reported on 9/18/2023)     Family History       Problem Relation (Age of Onset)    Colon cancer Other    Colon polyps Other    Crohn's disease Other    Esophageal cancer Other    Heart attack Mother, Father    Liver cancer Other    Rectal cancer Other    Stomach cancer Other          Tobacco Use    Smoking status: Never    Smokeless tobacco: Never   Substance and Sexual Activity    Alcohol use: Yes     Alcohol/week: 2.0 standard drinks of alcohol     Types: 1 Cans of beer, 1 Shots of liquor per week     Comment: weekends    Drug use: No    Sexual activity: Not on file     Review of Systems  Objective:     Vital Signs (Most Recent):  Temp: 100 °F (37.8 °C) (03/25/24 2018)  Pulse: 100 (03/25/24 2100)  Resp: (!) 34 (03/25/24 2100)  BP: (!) 122/58 (03/25/24 2100)  SpO2: 100 % (03/25/24 2100) Vital Signs (24h Range):  Temp:  [99.9 °F (37.7 °C)-100 °F (37.8 °C)] 100 °F (37.8 °C)  Pulse:  " [] 100  Resp:  [24-39] 34  SpO2:  [74 %-100 %] 100 %  BP: (122-151)/(58-96) 122/58     Weight: 113.4 kg (250 lb)  Body mass index is 44.29 kg/m².     Physical Exam   Gen: In bed, NAD  Head: Normocephalic  CVD: S1/S2  Resp: Diminished BS  GI: Abd soft, ND  Ext: Left forearm wrapped  Skin: Warm, dry  Neuro: Alert         Significant Labs: All pertinent labs within the past 24 hours have been reviewed.  CBC:   Recent Labs   Lab 03/25/24 2029   WBC 10.33   HGB 12.0   HCT 36.1*        CMP:   Recent Labs   Lab 03/25/24 2029   *   K 3.4*      CO2 27   *   BUN 13   CREATININE 0.8   CALCIUM 9.1   PROT 7.0   ALBUMIN 3.6   BILITOT 1.1*   ALKPHOS 111   AST 28   ALT 10   ANIONGAP 8       Significant Imaging: I have reviewed all pertinent imaging results/findings within the past 24 hours.    Assessment/Plan:     * Acute hypoxic respiratory failure secondary to Pneumonia  -IV abx  -Blood cx x2  -Duonebs prn  -Supplemental O2 as necessary and wean as tolerated  -CT Chest  -Antipyretic prn  -Antitussive prn  -Consider Pulmonology consult  -Telemetry monitoring  -Monitor labs  -Vital sign checks to help ensure hemodynamic stability    HTN (hypertension)  -Home med and adjust as necessary  -Prn Hydralazine  -Vital sign checks    Physical deconditioning  -PT/OT      Status post surgery (recent left forearm surgery)  -Analgesic prn  -Outpatient follow-up        VTE Risk Mitigation (From admission, onward)           Ordered     enoxaparin injection 40 mg  Every 12 hours        Note to Pharmacy: Ht: 5' 3" (1.6 m)  Wt: 113.4 kg (250 lb)  Estimated Creatinine Clearance: 80.5 mL/min (based on SCr of 0.8 mg/dL).  Body mass index is 44.29 kg/m².    03/25/24 2252     IP VTE HIGH RISK PATIENT  Once         03/25/24 2244     Place sequential compression device  Until discontinued         03/25/24 2244                               Pharmacist Adjustment Note    Megha Robb is a 69 y.o. female being treated " with Enoxaparin.     Patient Data:    Vital Signs (Most Recent):  Temp: 100.2 °F (37.9 °C) (03/25/24 2224)  Pulse: 93 (03/25/24 2230)  Resp: (!) 26 (03/25/24 2230)  BP: (!) 106/54 (03/25/24 2230)  SpO2: 98 % (03/25/24 2230) Vital Signs (72h Range):  Temp:  [99.9 °F (37.7 °C)-100.2 °F (37.9 °C)]   Pulse:  []   Resp:  [20-39]   BP: (102-151)/(54-96)   SpO2:  [74 %-100 %]      Recent Labs   Lab 03/25/24 2029   CREATININE 0.8     Serum creatinine: 0.8 mg/dL 03/25/24 2029  Estimated creatinine clearance: 80.5 mL/min  Body mass index is 44.29 kg/m².    Enoxaparin 40 mg every 24 hours will be changed to Enoxaparin 40 mg every 12 hours due to BMI > 40 and CrCl > 30 per Renal Dose Adjustment protocol.    Pharmacist's Name: Candace Flores  Pharmacist's Extension: [phone]      Nadir Jaime MD  Department of Hospital Medicine  [facility] - Emergency Dept

## 2024-03-26 NOTE — PLAN OF CARE
Goals to be met by: 24     Patient will increase functional independence with mobility by performin. Supine to sit with Supervision  2. Sit to stand transfer with Supervision  3. Bed to chair transfer with Supervision using Rolling Walker  4. Gait  x 150 feet with Supervision using Rolling Walker.

## 2024-03-26 NOTE — PLAN OF CARE
Critical access hospital - Emergency Dept  Initial Discharge Assessment       Primary Care Provider: Cyrus Patel MD (Inactive)    Admission Diagnosis: Acute hypoxic respiratory failure [J96.01]    Admission Date: 3/25/2024  Expected Discharge Date:      completed discharge assessment with Pt at bedside. Pt AAOx4s. Pt lives at home with son and son's significant other. Demographics and insurance verified. Pt states her PCP retired and she is supposed to be seeing another one soon. No home health. Pt declined home health recommendation by therapy team. No dialysis. Pt completes ADLs without assistance. Pt to discharge home via family transport. Pt has no other needs to be addressed at this time.    Transition of Care Barriers: None    Payor: [employer] MEDICARE / Plan: [employer] H[employer] PPO SPECIAL NEEDS / Product Type: Medicare Advantage /     Extended Emergency Contact Information  Primary Emergency Contact: Charito Robb  Address: [address] [place] [address] [facility]  Home Phone: [phone]  Mobile Phone: [phone]  Relation: Relative  Preferred language: English   needed? No  Secondary Emergency Contact: Lakisha Banks  Address: [address] [place] [address] [id number] [place] [address]  Mobile Phone: [phone]  Relation: Friend    Discharge Plan A: Home with family  Discharge Plan B: Home with family      [facility]  [address] [facility] [address]  Phone: [phone] Fax: [phone]    [facility] - [place], [place] - [address]  [address] [facility] [address]  Phone: [phone] Fax: [phone]      Initial Assessment (most recent)       Adult Discharge Assessment - 03/26/24 1249          Discharge Assessment    Assessment Type Discharge Planning Assessment     Confirmed/corrected address, phone number and insurance Yes     Confirmed Demographics Correct on  Facesheet     Source of Information patient     Does patient/caregiver understand observation status Yes     Communicated JORGE L with patient/caregiver Date not available/Unable to determine     Reason For Admission Acute hypoxic respiratory failure secondary to Pneumonia     People in Home child(gaston), adult     Facility Arrived From: home     Do you expect to return to your current living situation? Yes     Do you have help at home or someone to help you manage your care at home? Yes     Who are your caregiver(s) and their phone number(s)? Charito Robb (Relative)  [phone] (Mobile)     Prior to hospitilization cognitive status: Alert/Oriented     Current cognitive status: Alert/Oriented     Walking or Climbing Stairs Difficulty no     Dressing/Bathing Difficulty no     Home Accessibility wheelchair accessible     Home Layout Able to live on 1st floor     Equipment Currently Used at Home none     Readmission within 30 days? No     Patient currently being followed by outpatient case management? No     Do you currently have service(s) that help you manage your care at home? No     Do you take prescription medications? Yes     Do you have prescription coverage? Yes     Coverage Payor:   [employer] MANAGED MEDICARE - [facility] -     Do you have any problems affording any of your prescribed medications? No     Is the patient taking medications as prescribed? yes     Who is going to help you get home at discharge? Son     How do you get to doctors appointments? family or friend will provide     Are you on dialysis? No     Do you take coumadin? No     Discharge Plan A Home with family     Discharge Plan B Home with family     DME Needed Upon Discharge  none     Discharge Plan discussed with: Patient     Transition of Care Barriers None

## 2024-03-26 NOTE — PROGRESS NOTES
Pharmacist Adjustment Note    Megha Robb is a 69 y.o. female being treated with Enoxaparin.     Patient Data:    Vital Signs (Most Recent):  Temp: 100.2 °F (37.9 °C) (03/25/24 2224)  Pulse: 93 (03/25/24 2230)  Resp: (!) 26 (03/25/24 2230)  BP: (!) 106/54 (03/25/24 2230)  SpO2: 98 % (03/25/24 2230) Vital Signs (72h Range):  Temp:  [99.9 °F (37.7 °C)-100.2 °F (37.9 °C)]   Pulse:  []   Resp:  [20-39]   BP: (102-151)/(54-96)   SpO2:  [74 %-100 %]      Recent Labs   Lab 03/25/24 2029   CREATININE 0.8     Serum creatinine: 0.8 mg/dL 03/25/24 2029  Estimated creatinine clearance: 80.5 mL/min  Body mass index is 44.29 kg/m².    Enoxaparin 40 mg every 24 hours will be changed to Enoxaparin 40 mg every 12 hours due to BMI > 40 and CrCl > 30 per Renal Dose Adjustment protocol.    Pharmacist's Name: Candace Flores  Pharmacist's Extension: [phone]

## 2024-03-26 NOTE — SUBJECTIVE & OBJECTIVE
Past Medical History:   Diagnosis Date    Anemia     Cancer     female    Diverticulosis     Hypertension     Osteoarthritis of midfoot 09/11/2017       Past Surgical History:   Procedure Laterality Date    APPENDECTOMY      breast reduction      CHOLECYSTECTOMY      COLONOSCOPY N/A 7/28/2023    Procedure: COLONOSCOPY;  Surgeon: Bryson Srinivasan MD;  Location: [facility];  Service: Endoscopy;  Laterality: N/A;    ESOPHAGOGASTRODUODENOSCOPY N/A 8/18/2023    Procedure: EGD (ESOPHAGOGASTRODUODENOSCOPY);  Surgeon: Bryson Srinivasan MD;  Location: [facility];  Service: Endoscopy;  Laterality: N/A;    EYE SURGERY Bilateral     PHACO    GALLBLADDER SURGERY      GASTRIC RESTRICTION SURGERY      HYSTERECTOMY      JOINT REPLACEMENT      left knee replacement      april 2017    LIPOSUCTION      to legs x3    TONSILLECTOMY      TOTAL REDUCTION MAMMOPLASTY      tummy tuck      VARICOSE VEIN SURGERY      WRIST ARTHROPLASTY         Review of patient's allergies indicates:   Allergen Reactions    Bee sting [allergen ext-venom-honey bee] Shortness Of Breath    Pcn [penicillins] Hives    Vancomycin analogues Rash       No current facility-administered medications on file prior to encounter.     Current Outpatient Medications on File Prior to Encounter   Medication Sig    amitriptyline (ELAVIL) 25 MG tablet Take 50 mg by mouth every evening.    busPIRone (BUSPAR) 10 MG tablet Take 10 mg by mouth 2 (two) times daily.    CARAFATE 1 gram tablet Take 1 g by mouth 4 (four) times daily before meals and nightly.    ferrous sulfate 325 (65 FE) MG EC tablet Take 325 mg by mouth once daily.     HYDROcodone-acetaminophen (NORCO)  mg per tablet Take 1 tablet by mouth 4 (four) times daily as needed for Pain.    hydrOXYzine pamoate (VISTARIL) 50 MG Cap Take 50 mg by mouth once daily.    LYRICA 200 mg Cap Take 200 mg by mouth 3 (three) times daily.    multivitamin (THERAGRAN) per tablet Take 1 tablet by mouth once daily.      pantoprazole  (PROTONIX) 40 MG tablet Take 1 tablet (40 mg total) by mouth once daily.    quinapriL (ACCUPRIL) 40 MG tablet Take 40 mg by mouth once daily.    alprazolam (XANAX) 0.25 MG tablet Take 0.25 mg by mouth 2 (two) times daily as needed. pateint unsure of dosage    carisoprodol (SOMA) 350 MG tablet Take 350 mg by mouth 4 (four) times daily as needed. Patient unsure of mg or dosage     FLUVIRIN [id number] 45 mcg (15 mcg x 3)/0.5 mL Susp     [DISCONTINUED] alprazolam (XANAX) 2 MG Tab     [DISCONTINUED] citalopram (CELEXA) 20 MG tablet Take 20 mg by mouth once daily.    [DISCONTINUED] diclofenac (VOLTAREN) 0.1 % ophthalmic solution 1 drop 4 (four) times daily.    [DISCONTINUED] diclofenac (VOLTAREN) 50 MG EC tablet     [DISCONTINUED] estrogens, conjugated, (PREMARIN) 1.25 MG tablet Take 1.25 mg by mouth once daily. Patient unsure of dosage     [DISCONTINUED] hydrocodone-acetaminophen (LORCET)  mg per tablet Take 1 tablet by mouth every 6 (six) hours as needed.    [DISCONTINUED] mupirocin (BACTROBAN) 2 % ointment Apply topically 3 (three) times daily. (Patient not taking: Reported on 9/18/2023)    [DISCONTINUED] oxycodone-acetaminophen (PERCOCET) 5-325 mg per tablet Take 1 tablet by mouth every 6 (six) hours as needed for Pain. (Patient not taking: Reported on 9/18/2023)    [DISCONTINUED] pregabalin (LYRICA) 50 MG capsule Take 50 mg by mouth 3 (three) times daily.    [DISCONTINUED] promethazine (PHENERGAN) 25 MG tablet Take 1 tablet (25 mg total) by mouth every 6 (six) hours as needed for Nausea. (Patient not taking: Reported on 9/18/2023)    [DISCONTINUED] quinapril (ACCUPRIL) 5 MG tablet Take 40 mg by mouth every evening. Patient unsure of dosage    [DISCONTINUED] tizanidine (ZANAFLEX) 4 MG tablet TAKE 1 TABLET BY MOUTH EVERY NIGHT AT BEDTIME AS NEEDED (Patient not taking: Reported on 9/18/2023)     Family History       Problem Relation (Age of Onset)    Colon cancer Other    Colon polyps Other    Crohn's disease Other     Esophageal cancer Other    Heart attack Mother, Father    Liver cancer Other    Rectal cancer Other    Stomach cancer Other          Tobacco Use    Smoking status: Never    Smokeless tobacco: Never   Substance and Sexual Activity    Alcohol use: Yes     Alcohol/week: 2.0 standard drinks of alcohol     Types: 1 Cans of beer, 1 Shots of liquor per week     Comment: weekends    Drug use: No    Sexual activity: Not on file     Review of Systems  Objective:     Vital Signs (Most Recent):  Temp: 100 °F (37.8 °C) (03/25/24 2018)  Pulse: 100 (03/25/24 2100)  Resp: (!) 34 (03/25/24 2100)  BP: (!) 122/58 (03/25/24 2100)  SpO2: 100 % (03/25/24 2100) Vital Signs (24h Range):  Temp:  [99.9 °F (37.7 °C)-100 °F (37.8 °C)] 100 °F (37.8 °C)  Pulse:  [] 100  Resp:  [24-39] 34  SpO2:  [74 %-100 %] 100 %  BP: (122-151)/(58-96) 122/58     Weight: 113.4 kg (250 lb)  Body mass index is 44.29 kg/m².     Physical Exam   Gen: In bed, NAD  Head: Normocephalic  CVD: S1/S2  Resp: Diminished BS  GI: Abd soft, ND  Ext: Left forearm wrapped  Skin: Warm, dry  Neuro: Alert         Significant Labs: All pertinent labs within the past 24 hours have been reviewed.  CBC:   Recent Labs   Lab 03/25/24 2029   WBC 10.33   HGB 12.0   HCT 36.1*        CMP:   Recent Labs   Lab 03/25/24 2029   *   K 3.4*      CO2 27   *   BUN 13   CREATININE 0.8   CALCIUM 9.1   PROT 7.0   ALBUMIN 3.6   BILITOT 1.1*   ALKPHOS 111   AST 28   ALT 10   ANIONGAP 8       Significant Imaging: I have reviewed all pertinent imaging results/findings within the past 24 hours.

## 2024-03-26 NOTE — DISCHARGE INSTRUCTIONS
"You had images in the hospital. Your CT chest showed the below findings: Follow up with PCP in clinic for non-emergent further surveillance and diagnostics as warranted:  "IMPRESSION: 1. Findings as above suggest multifocal pneumonia. 2. Scattered bilateral nodular densities are likely infectious/inflammatory and related to multifocal pneumonia. 3 month follow-up CT is recommended to document resolution 3. Probable reactive mediastinal and hilar lymph nodes. Attention on follow-up is recommended   -Follow up closely with PCP for repeat CT after treatment for further surveillance.      Complete medications as ordered  Follow all discharge instructions.  Please schedule follow up appointments as necessary      When to Call Your Doctor  Call your doctor immediately if you have any of the following:  Severe headache  Severe dizziness, or fainting  Nausea or vomiting  Fast heartbeat (higher than 100 beats per minute)  Fever or chills  Swollen ankles  Weakness  Chest Pain attacks that last longer, occur more often, or are more severe than in the past       "

## 2024-03-27 PROBLEM — R60.0 BILATERAL LOWER EXTREMITY EDEMA: Status: ACTIVE | Noted: 2024-03-27

## 2024-03-27 PROBLEM — J18.9 MULTIFOCAL PNEUMONIA: Status: ACTIVE | Noted: 2024-03-27

## 2024-03-27 LAB
ALBUMIN SERPL BCP-MCNC: 2.8 G/DL (ref 3.5–5.2)
ALP SERPL-CCNC: 92 U/L (ref 55–135)
ALT SERPL W/O P-5'-P-CCNC: 7 U/L (ref 10–44)
ANION GAP SERPL CALC-SCNC: 4 MMOL/L (ref 8–16)
AST SERPL-CCNC: 20 U/L (ref 10–40)
BASOPHILS # BLD AUTO: 0.01 K/UL (ref 0–0.2)
BASOPHILS NFR BLD: 0.1 % (ref 0–1.9)
BILIRUB SERPL-MCNC: 0.7 MG/DL (ref 0.1–1)
BUN SERPL-MCNC: 8 MG/DL (ref 8–23)
CALCIUM SERPL-MCNC: 8.4 MG/DL (ref 8.7–10.5)
CHLORIDE SERPL-SCNC: 106 MMOL/L (ref 95–110)
CO2 SERPL-SCNC: 27 MMOL/L (ref 23–29)
CREAT SERPL-MCNC: 0.5 MG/DL (ref 0.5–1.4)
DIFFERENTIAL METHOD BLD: ABNORMAL
EOSINOPHIL # BLD AUTO: 0.3 K/UL (ref 0–0.5)
EOSINOPHIL NFR BLD: 3.7 % (ref 0–8)
ERYTHROCYTE [DISTWIDTH] IN BLOOD BY AUTOMATED COUNT: 14.1 % (ref 11.5–14.5)
EST. GFR  (NO RACE VARIABLE): >60 ML/MIN/1.73 M^2
GLUCOSE SERPL-MCNC: 115 MG/DL (ref 70–110)
HCT VFR BLD AUTO: 31.2 % (ref 37–48.5)
HGB BLD-MCNC: 10 G/DL (ref 12–16)
IMM GRANULOCYTES # BLD AUTO: 0.03 K/UL (ref 0–0.04)
IMM GRANULOCYTES NFR BLD AUTO: 0.4 % (ref 0–0.5)
LYMPHOCYTES # BLD AUTO: 0.5 K/UL (ref 1–4.8)
LYMPHOCYTES NFR BLD: 7.8 % (ref 18–48)
MAGNESIUM SERPL-MCNC: 1.9 MG/DL (ref 1.6–2.6)
MCH RBC QN AUTO: 31.3 PG (ref 27–31)
MCHC RBC AUTO-ENTMCNC: 32.1 G/DL (ref 32–36)
MCV RBC AUTO: 98 FL (ref 82–98)
MONOCYTES # BLD AUTO: 0.5 K/UL (ref 0.3–1)
MONOCYTES NFR BLD: 7.4 % (ref 4–15)
NEUTROPHILS # BLD AUTO: 5.5 K/UL (ref 1.8–7.7)
NEUTROPHILS NFR BLD: 80.6 % (ref 38–73)
NRBC BLD-RTO: 0 /100 WBC
PLATELET # BLD AUTO: 207 K/UL (ref 150–450)
PMV BLD AUTO: 9.4 FL (ref 9.2–12.9)
POTASSIUM SERPL-SCNC: 3.8 MMOL/L (ref 3.5–5.1)
PROT SERPL-MCNC: 5.7 G/DL (ref 6–8.4)
RBC # BLD AUTO: 3.19 M/UL (ref 4–5.4)
SODIUM SERPL-SCNC: 137 MMOL/L (ref 136–145)
WBC # BLD AUTO: 6.78 K/UL (ref 3.9–12.7)

## 2024-03-27 PROCEDURE — 25000003 PHARM REV CODE 250: Performed by: NURSE PRACTITIONER

## 2024-03-27 PROCEDURE — 80053 COMPREHEN METABOLIC PANEL: CPT | Performed by: INTERNAL MEDICINE

## 2024-03-27 PROCEDURE — 12000002 HC ACUTE/MED SURGE SEMI-PRIVATE ROOM

## 2024-03-27 PROCEDURE — 25000003 PHARM REV CODE 250: Performed by: INTERNAL MEDICINE

## 2024-03-27 PROCEDURE — 36415 COLL VENOUS BLD VENIPUNCTURE: CPT | Performed by: INTERNAL MEDICINE

## 2024-03-27 PROCEDURE — 63600175 PHARM REV CODE 636 W HCPCS: Performed by: INTERNAL MEDICINE

## 2024-03-27 PROCEDURE — 94640 AIRWAY INHALATION TREATMENT: CPT

## 2024-03-27 PROCEDURE — 63600175 PHARM REV CODE 636 W HCPCS: Performed by: NURSE PRACTITIONER

## 2024-03-27 PROCEDURE — 25000242 PHARM REV CODE 250 ALT 637 W/ HCPCS: Performed by: NURSE PRACTITIONER

## 2024-03-27 PROCEDURE — 85025 COMPLETE CBC W/AUTO DIFF WBC: CPT | Performed by: INTERNAL MEDICINE

## 2024-03-27 PROCEDURE — 83735 ASSAY OF MAGNESIUM: CPT | Performed by: INTERNAL MEDICINE

## 2024-03-27 PROCEDURE — 27000221 HC OXYGEN, UP TO 24 HOURS

## 2024-03-27 RX ORDER — GUAIFENESIN 600 MG/1
600 TABLET, EXTENDED RELEASE ORAL 2 TIMES DAILY
Status: DISCONTINUED | OUTPATIENT
Start: 2024-03-27 | End: 2024-04-01 | Stop reason: HOSPADM

## 2024-03-27 RX ORDER — IPRATROPIUM BROMIDE AND ALBUTEROL SULFATE 2.5; .5 MG/3ML; MG/3ML
3 SOLUTION RESPIRATORY (INHALATION) EVERY 6 HOURS
Status: DISCONTINUED | OUTPATIENT
Start: 2024-03-27 | End: 2024-04-01 | Stop reason: HOSPADM

## 2024-03-27 RX ORDER — FUROSEMIDE 10 MG/ML
40 INJECTION INTRAMUSCULAR; INTRAVENOUS ONCE
Status: COMPLETED | OUTPATIENT
Start: 2024-03-27 | End: 2024-03-27

## 2024-03-27 RX ADMIN — ENOXAPARIN SODIUM 40 MG: 40 INJECTION SUBCUTANEOUS at 09:03

## 2024-03-27 RX ADMIN — HYDROCODONE BITARTRATE AND ACETAMINOPHEN 1 TABLET: 5; 325 TABLET ORAL at 12:03

## 2024-03-27 RX ADMIN — FUROSEMIDE 40 MG: 10 INJECTION, SOLUTION INTRAVENOUS at 06:03

## 2024-03-27 RX ADMIN — ENOXAPARIN SODIUM 40 MG: 40 INJECTION SUBCUTANEOUS at 11:03

## 2024-03-27 RX ADMIN — LINEZOLID 600 MG: 600 INJECTION, SOLUTION INTRAVENOUS at 11:03

## 2024-03-27 RX ADMIN — CEFEPIME 1 G: 1 INJECTION, POWDER, FOR SOLUTION INTRAMUSCULAR; INTRAVENOUS at 04:03

## 2024-03-27 RX ADMIN — HYDROCODONE BITARTRATE AND ACETAMINOPHEN 1 TABLET: 5; 325 TABLET ORAL at 05:03

## 2024-03-27 RX ADMIN — GUAIFENESIN 600 MG: 600 TABLET, EXTENDED RELEASE ORAL at 06:03

## 2024-03-27 RX ADMIN — HYDROCODONE BITARTRATE AND ACETAMINOPHEN 1 TABLET: 5; 325 TABLET ORAL at 09:03

## 2024-03-27 RX ADMIN — Medication 6 MG: at 09:03

## 2024-03-27 RX ADMIN — IPRATROPIUM BROMIDE AND ALBUTEROL SULFATE 3 ML: 2.5; .5 SOLUTION RESPIRATORY (INHALATION) at 09:03

## 2024-03-27 RX ADMIN — BENZONATATE 100 MG: 100 CAPSULE ORAL at 09:03

## 2024-03-27 RX ADMIN — CEFEPIME 1 G: 1 INJECTION, POWDER, FOR SOLUTION INTRAMUSCULAR; INTRAVENOUS at 12:03

## 2024-03-27 NOTE — ASSESSMENT & PLAN NOTE
IV cefepime, IV linezolid  Follow blood/sputum cx  Supplemental oxygen as needed  Repeat procalcitonin in am  Mucinex bid  Scheduled bronchodilator tx  CXR in am

## 2024-03-27 NOTE — ASSESSMENT & PLAN NOTE
-IV abx  -Blood cx x2-NGTD  -Duonebs prn  -Supplemental O2 as necessary and wean as tolerated  -CT Chest  -Antipyretic prn  -Antitussive prn  -Telemetry monitoring  -Consider Pulmonology consult if no improvement  -Monitor labs  -Vital sign checks to help ensure hemodynamic stability

## 2024-03-27 NOTE — PT/OT/SLP PROGRESS
Occupational Therapy      Patient Name:  Megha Robb   MRN:  [id number]    Patient not seen today secondary to  (Pt declined, stated she needed to make phone calls right now.). Will follow-up next service date.    3/27/2024

## 2024-03-27 NOTE — NURSING
Nurses Note -- 4 Eyes      3/27/2024   6:46 PM      Skin assessed during: Admit      [] No Altered Skin Integrity Present    []Prevention Measures Documented      [x] Yes- Altered Skin Integrity Present or Discovered   [x] LDA Added if Not in Epic (Describe Wound)   [x] New Altered Skin Integrity was Present on Admit and Documented in LDA   [x] Wound Image Taken    Wound Care Consulted? Yes    Attending Nurse:  Lizeth Willis RN/Staff Member:   Georgina Pelayo LPN

## 2024-03-27 NOTE — PT/OT/SLP PROGRESS
"Physical Therapy      Patient Name:  Megha Robb   MRN:  [id number]    Patient not seen today secondary to Patient unwilling to participate, Other (Comment) (attempted PT tx in ED, but pt on the phone and reported "it's not a good time." PT unavailable to return.). Will follow-up 3/28/24.    "

## 2024-03-27 NOTE — ASSESSMENT & PLAN NOTE
-Analgesic prn  -lost to follow  -will reach out to ortho - patient may have been lost to follow - has left arm splint since Jan  Per Dr. Fierro, pt OK to f/u OP  Pt states surgery per Dr. Medley

## 2024-03-27 NOTE — SUBJECTIVE & OBJECTIVE
Interval History: continues to require supplemental oxygen.  Per nursing report, pt ambulated to bathroom without oxygen and saturation dropped to 79%.  She has multiple complaints at this time, primarily about her bed, food, etc.  Refused PT this morning due to personal issues she was trying to sort out on the telephone at the time PT was at bedside.  Encouraged to participate at next opportunity.    Review of Systems   Constitutional:  Positive for activity change and fatigue.   Respiratory:  Positive for cough and shortness of breath.    Gastrointestinal:  Negative for nausea and vomiting.   Musculoskeletal:  Positive for arthralgias.   Neurological:  Positive for light-headedness. Negative for syncope.     Objective:     Vital Signs (Most Recent):  Temp: 98.7 °F (37.1 °C) (03/27/24 0400)  Pulse: 86 (03/27/24 0435)  Resp: (!) 22 (03/27/24 1236)  BP: 135/63 (03/27/24 0435)  SpO2: (!) 94 % (03/27/24 0435) Vital Signs (24h Range):  Temp:  [98.7 °F (37.1 °C)-99.2 °F (37.3 °C)] 98.7 °F (37.1 °C)  Pulse:  [83-93] 86  Resp:  [18-22] 22  SpO2:  [94 %-98 %] 94 %  BP: (135-148)/(63-70) 135/63     Weight: 113.4 kg (250 lb)  Body mass index is 44.29 kg/m².    Intake/Output Summary (Last 24 hours) at 3/27/2024 1500  Last data filed at 3/26/2024 1505  Gross per 24 hour   Intake 240 ml   Output --   Net 240 ml         Physical Exam  Constitutional:       General: She is not in acute distress.     Appearance: She is obese.      Interventions: Nasal cannula in place.   HENT:      Head: Normocephalic and atraumatic.      Mouth/Throat:      Mouth: Mucous membranes are dry.   Eyes:      Conjunctiva/sclera: Conjunctivae normal.      Pupils: Pupils are equal, round, and reactive to light.   Cardiovascular:      Rate and Rhythm: Normal rate and regular rhythm.      Pulses: Normal pulses.   Pulmonary:      Breath sounds: Rhonchi and rales present.   Abdominal:      General: Bowel sounds are normal.      Palpations: Abdomen is soft.       Tenderness: There is no abdominal tenderness.      Comments: Large abdominal girth   Musculoskeletal:         General: Normal range of motion.      Right lower leg: Edema (2+) present.      Left lower leg: Edema (2+) present.      Comments: Left lower arm splint   Skin:     General: Skin is warm and dry.      Capillary Refill: Capillary refill takes 2 to 3 seconds.   Neurological:      General: No focal deficit present.      Mental Status: She is alert and oriented to person, place, and time.   Psychiatric:         Attention and Perception: She is inattentive.         Mood and Affect: Mood normal.         Behavior: Behavior is cooperative.             Significant Labs: All pertinent labs within the past 24 hours have been reviewed.  CBC:   Recent Labs   Lab 03/25/24 2029 03/26/24 0308 03/27/24  0815   WBC 10.33 8.81 6.78   HGB 12.0 10.9* 10.0*   HCT 36.1* 34.4* 31.2*    205 207     CMP:   Recent Labs   Lab 03/25/24 2029 03/26/24 0308 03/27/24  0635   * 136 137   K 3.4* 3.4* 3.8    104 106   CO2 27 27 27   * 124* 115*   BUN 13 11 8   CREATININE 0.8 0.7 0.5   CALCIUM 9.1 8.5* 8.4*   PROT 7.0 6.2 5.7*   ALBUMIN 3.6 3.2* 2.8*   BILITOT 1.1* 0.9 0.7   ALKPHOS 111 97 92   AST 28 24 20   ALT 10 8* 7*   ANIONGAP 8 5* 4*       Significant Imaging: I have reviewed all pertinent imaging results/findings within the past 24 hours.

## 2024-03-27 NOTE — ASSESSMENT & PLAN NOTE
Noted bilateral LE edema.  Does not seem to have pain.  US BLE ordered-no d-dimer in light of recent surgery.   Recent CT chest with contrast showed multifocal pneumonia  Rales on exam-trial of IV lasix 40mg x 1

## 2024-03-28 LAB
ALBUMIN SERPL BCP-MCNC: 3 G/DL (ref 3.5–5.2)
ALP SERPL-CCNC: 95 U/L (ref 55–135)
ALT SERPL W/O P-5'-P-CCNC: 7 U/L (ref 10–44)
ANION GAP SERPL CALC-SCNC: 5 MMOL/L (ref 8–16)
AST SERPL-CCNC: 18 U/L (ref 10–40)
BACTERIA UR CULT: NO GROWTH
BASOPHILS # BLD AUTO: 0.02 K/UL (ref 0–0.2)
BASOPHILS NFR BLD: 0.4 % (ref 0–1.9)
BILIRUB SERPL-MCNC: 0.5 MG/DL (ref 0.1–1)
BUN SERPL-MCNC: 7 MG/DL (ref 8–23)
CALCIUM SERPL-MCNC: 8.5 MG/DL (ref 8.7–10.5)
CHLORIDE SERPL-SCNC: 100 MMOL/L (ref 95–110)
CO2 SERPL-SCNC: 32 MMOL/L (ref 23–29)
CREAT SERPL-MCNC: 0.6 MG/DL (ref 0.5–1.4)
DIFFERENTIAL METHOD BLD: ABNORMAL
EOSINOPHIL # BLD AUTO: 0.1 K/UL (ref 0–0.5)
EOSINOPHIL NFR BLD: 1.2 % (ref 0–8)
ERYTHROCYTE [DISTWIDTH] IN BLOOD BY AUTOMATED COUNT: 13.8 % (ref 11.5–14.5)
EST. GFR  (NO RACE VARIABLE): >60 ML/MIN/1.73 M^2
GLUCOSE SERPL-MCNC: 104 MG/DL (ref 70–110)
HCT VFR BLD AUTO: 33.1 % (ref 37–48.5)
HGB BLD-MCNC: 10.7 G/DL (ref 12–16)
IMM GRANULOCYTES # BLD AUTO: 0.02 K/UL (ref 0–0.04)
IMM GRANULOCYTES NFR BLD AUTO: 0.4 % (ref 0–0.5)
LYMPHOCYTES # BLD AUTO: 0.7 K/UL (ref 1–4.8)
LYMPHOCYTES NFR BLD: 14.8 % (ref 18–48)
MAGNESIUM SERPL-MCNC: 1.8 MG/DL (ref 1.6–2.6)
MCH RBC QN AUTO: 31.3 PG (ref 27–31)
MCHC RBC AUTO-ENTMCNC: 32.3 G/DL (ref 32–36)
MCV RBC AUTO: 97 FL (ref 82–98)
MONOCYTES # BLD AUTO: 0.4 K/UL (ref 0.3–1)
MONOCYTES NFR BLD: 8.4 % (ref 4–15)
NEUTROPHILS # BLD AUTO: 3.6 K/UL (ref 1.8–7.7)
NEUTROPHILS NFR BLD: 74.8 % (ref 38–73)
NRBC BLD-RTO: 0 /100 WBC
PLATELET # BLD AUTO: 204 K/UL (ref 150–450)
PMV BLD AUTO: 9.3 FL (ref 9.2–12.9)
POTASSIUM SERPL-SCNC: 3.3 MMOL/L (ref 3.5–5.1)
PROCALCITONIN SERPL IA-MCNC: 1.08 NG/ML (ref 0–0.5)
PROT SERPL-MCNC: 6.1 G/DL (ref 6–8.4)
RBC # BLD AUTO: 3.42 M/UL (ref 4–5.4)
SODIUM SERPL-SCNC: 137 MMOL/L (ref 136–145)
WBC # BLD AUTO: 4.86 K/UL (ref 3.9–12.7)

## 2024-03-28 PROCEDURE — 97116 GAIT TRAINING THERAPY: CPT | Mod: CQ

## 2024-03-28 PROCEDURE — 27000221 HC OXYGEN, UP TO 24 HOURS

## 2024-03-28 PROCEDURE — 85025 COMPLETE CBC W/AUTO DIFF WBC: CPT | Performed by: INTERNAL MEDICINE

## 2024-03-28 PROCEDURE — 94761 N-INVAS EAR/PLS OXIMETRY MLT: CPT

## 2024-03-28 PROCEDURE — 94640 AIRWAY INHALATION TREATMENT: CPT

## 2024-03-28 PROCEDURE — 99900035 HC TECH TIME PER 15 MIN (STAT)

## 2024-03-28 PROCEDURE — C1751 CATH, INF, PER/CENT/MIDLINE: HCPCS

## 2024-03-28 PROCEDURE — 12000002 HC ACUTE/MED SURGE SEMI-PRIVATE ROOM

## 2024-03-28 PROCEDURE — 36415 COLL VENOUS BLD VENIPUNCTURE: CPT | Performed by: NURSE PRACTITIONER

## 2024-03-28 PROCEDURE — 25000003 PHARM REV CODE 250: Performed by: NURSE PRACTITIONER

## 2024-03-28 PROCEDURE — 99900031 HC PATIENT EDUCATION (STAT)

## 2024-03-28 PROCEDURE — 83735 ASSAY OF MAGNESIUM: CPT | Performed by: INTERNAL MEDICINE

## 2024-03-28 PROCEDURE — 97535 SELF CARE MNGMENT TRAINING: CPT

## 2024-03-28 PROCEDURE — 80053 COMPREHEN METABOLIC PANEL: CPT | Performed by: INTERNAL MEDICINE

## 2024-03-28 PROCEDURE — 63600175 PHARM REV CODE 636 W HCPCS: Performed by: NURSE PRACTITIONER

## 2024-03-28 PROCEDURE — 84145 PROCALCITONIN (PCT): CPT | Performed by: NURSE PRACTITIONER

## 2024-03-28 PROCEDURE — 25000242 PHARM REV CODE 250 ALT 637 W/ HCPCS: Performed by: NURSE PRACTITIONER

## 2024-03-28 PROCEDURE — 63600175 PHARM REV CODE 636 W HCPCS: Performed by: INTERNAL MEDICINE

## 2024-03-28 PROCEDURE — 25000003 PHARM REV CODE 250: Performed by: INTERNAL MEDICINE

## 2024-03-28 RX ORDER — LISINOPRIL 20 MG/1
40 TABLET ORAL DAILY
Status: DISCONTINUED | OUTPATIENT
Start: 2024-03-29 | End: 2024-03-30

## 2024-03-28 RX ORDER — NYSTATIN 100000 [USP'U]/ML
500000 SUSPENSION ORAL 4 TIMES DAILY
Status: DISCONTINUED | OUTPATIENT
Start: 2024-03-28 | End: 2024-04-01 | Stop reason: HOSPADM

## 2024-03-28 RX ORDER — SUCRALFATE 1 G/1
1 TABLET ORAL
Status: DISCONTINUED | OUTPATIENT
Start: 2024-03-28 | End: 2024-04-01 | Stop reason: HOSPADM

## 2024-03-28 RX ORDER — LANOLIN ALCOHOL/MO/W.PET/CERES
1 CREAM (GRAM) TOPICAL DAILY
Status: DISCONTINUED | OUTPATIENT
Start: 2024-03-29 | End: 2024-04-01 | Stop reason: HOSPADM

## 2024-03-28 RX ORDER — FUROSEMIDE 10 MG/ML
40 INJECTION INTRAMUSCULAR; INTRAVENOUS ONCE
Status: COMPLETED | OUTPATIENT
Start: 2024-03-28 | End: 2024-03-28

## 2024-03-28 RX ORDER — PANTOPRAZOLE SODIUM 40 MG/1
40 TABLET, DELAYED RELEASE ORAL DAILY
Status: DISCONTINUED | OUTPATIENT
Start: 2024-03-29 | End: 2024-04-01 | Stop reason: HOSPADM

## 2024-03-28 RX ADMIN — GUAIFENESIN 600 MG: 600 TABLET, EXTENDED RELEASE ORAL at 09:03

## 2024-03-28 RX ADMIN — IPRATROPIUM BROMIDE AND ALBUTEROL SULFATE 3 ML: 2.5; .5 SOLUTION RESPIRATORY (INHALATION) at 02:03

## 2024-03-28 RX ADMIN — FUROSEMIDE 40 MG: 10 INJECTION, SOLUTION INTRAVENOUS at 12:03

## 2024-03-28 RX ADMIN — IPRATROPIUM BROMIDE AND ALBUTEROL SULFATE 3 ML: 2.5; .5 SOLUTION RESPIRATORY (INHALATION) at 08:03

## 2024-03-28 RX ADMIN — NYSTATIN 500000 UNITS: 100000 SUSPENSION ORAL at 08:03

## 2024-03-28 RX ADMIN — HYDROCODONE BITARTRATE AND ACETAMINOPHEN 1 TABLET: 5; 325 TABLET ORAL at 04:03

## 2024-03-28 RX ADMIN — IPRATROPIUM BROMIDE AND ALBUTEROL SULFATE 3 ML: 2.5; .5 SOLUTION RESPIRATORY (INHALATION) at 07:03

## 2024-03-28 RX ADMIN — LINEZOLID 600 MG: 600 INJECTION, SOLUTION INTRAVENOUS at 10:03

## 2024-03-28 RX ADMIN — LINEZOLID 600 MG: 600 INJECTION, SOLUTION INTRAVENOUS at 12:03

## 2024-03-28 RX ADMIN — GUAIFENESIN 600 MG: 600 TABLET, EXTENDED RELEASE ORAL at 08:03

## 2024-03-28 RX ADMIN — SUCRALFATE 1 G: 1 TABLET ORAL at 08:03

## 2024-03-28 RX ADMIN — SUCRALFATE 1 G: 1 TABLET ORAL at 04:03

## 2024-03-28 RX ADMIN — PREGABALIN 200 MG: 75 CAPSULE ORAL at 08:03

## 2024-03-28 RX ADMIN — HYDROCODONE BITARTRATE AND ACETAMINOPHEN 1 TABLET: 5; 325 TABLET ORAL at 09:03

## 2024-03-28 RX ADMIN — ENOXAPARIN SODIUM 40 MG: 40 INJECTION SUBCUTANEOUS at 09:03

## 2024-03-28 RX ADMIN — Medication 6 MG: at 08:03

## 2024-03-28 RX ADMIN — ENOXAPARIN SODIUM 40 MG: 40 INJECTION SUBCUTANEOUS at 08:03

## 2024-03-28 RX ADMIN — CEFEPIME 1 G: 1 INJECTION, POWDER, FOR SOLUTION INTRAMUSCULAR; INTRAVENOUS at 08:03

## 2024-03-28 RX ADMIN — CEFEPIME 1 G: 1 INJECTION, POWDER, FOR SOLUTION INTRAMUSCULAR; INTRAVENOUS at 01:03

## 2024-03-28 NOTE — PLAN OF CARE
Problem: Adult Inpatient Plan of Care  Goal: Absence of Hospital-Acquired Illness or Injury  Outcome: Ongoing, Progressing  Goal: Optimal Comfort and Wellbeing  Outcome: Ongoing, Progressing     Problem: Fall Injury Risk  Goal: Absence of Fall and Fall-Related Injury  Outcome: Ongoing, Progressing     Problem: Adult Inpatient Plan of Care  Goal: Readiness for Transition of Care  Outcome: Adequate for Care Transition

## 2024-03-28 NOTE — PT/OT/SLP PROGRESS
"Occupational Therapy   Treatment    Name: Megha Robb  MRN: [id number]  Admitting Diagnosis:  Acute hypoxic respiratory failure       Recommendations:     Discharge Recommendations: Low Intensity Therapy  Discharge Equipment Recommendations:  none  Barriers to discharge:  None    Assessment:     Megha Robb is a 69 y.o. female with a medical diagnosis of Acute hypoxic respiratory failure. Performance deficits affecting function are weakness, impaired endurance, impaired functional mobility, impaired self care skills, gait instability, decreased safety awareness, impaired cardiopulmonary response to activity. Patient reports she did not sleep well due to being hot and then too cold. Left forearm is splinted from recent surgery.  Patient required much encouragement.     Rehab Prognosis:  Fair; patient would benefit from acute skilled OT services to address these deficits and reach maximum level of function.       Plan:     Patient to be seen 3 x/week to address the above listed problems via self-care/home management, therapeutic activities  Plan of Care Expires: 04/26/24  Plan of Care Reviewed with: patient    Subjective     Chief Complaint: "very hot right now" and therapist adjusted thermostat in room   Patient/Family Comments/goals: return home   Pain/Comfort:  Pain Rating 1: 0/10  Pain Rating Post-Intervention 1: 0/10    Objective:     Communicated with: nurse prior to session.  Patient found HOB elevated with blood pressure cuff, oxygen, peripheral IV, pulse ox (continuous), telemetry upon OT entry to room.    General Precautions: Standard, fall    Orthopedic Precautions:N/A  Braces:  (left forearm splint)  Respiratory Status: Nasal cannula, flow 2 L/min     Occupational Performance:     Bed Mobility:    Patient completed Rolling/Turning to Left with  stand by assistance  Patient completed Scooting/Bridging with stand by assistance  Patient completed Supine to Sit with stand by assistance     Activities of " Daily Living:  Grooming: stand by assistance/setup for washing face while seated at edge of bed     Treatment & Education:  Patient was educated on importance of activity and getting out of bed during hospitalization, equipment needs and discharge planning and reviewed use of call bell for assistance.     Patient left HOB elevated with all lines intact, call button in reach, and bed alarm on    GOALS:   Multidisciplinary Problems       Occupational Therapy Goals          Problem: Occupational Therapy    Goal Priority Disciplines Outcome Interventions   Occupational Therapy Goal     OT, PT/OT Ongoing, Progressing    Description: Goals to be met by: 4/26/24     Patient will increase functional independence with ADLs by performing:    UE Dressing with Supervision.  LE Dressing with Supervision.  Grooming while standing at sink with Supervision.  Toileting from toilet with Supervision for hygiene and clothing management.   Toilet transfer to toilet with Supervision.                         Time Tracking:     OT Date of Treatment: 03/27/24  OT Start Time: 1023  OT Stop Time: 1034  OT Total Time (min): 11 min    Billable Minutes:Self Care/Home Management 11    OT/MERLY: OT          3/28/2024

## 2024-03-28 NOTE — ASSESSMENT & PLAN NOTE
Analgesic prn  lost to follow  will reach out to ortho - patient may have been lost to follow - has left arm splint since Jan  Per Dr. Fierro, pt OK to f/u OP  Pt states surgery per Dr. Medley

## 2024-03-28 NOTE — ASSESSMENT & PLAN NOTE
IV cefepime, IV linezolid  Mucinex   Duonebs scheduled/PRN  Blood cultures NGTD  Sputum culture ordered and pending   Supplemental oxygen as needed  Repeat procalcitonin - downtrending

## 2024-03-28 NOTE — PROCEDURES
18 Gx 10cm PowerGlide Midline placed to pts RIGHT basilic vein with the use of ultrasound guidance.    Ultrasound guidance: yes  Vessel Caliber: large and patent, compressibility normal  Needle advanced into vessel with real time Ultrasound guidance.  Guidewire confirmed in vessel.  Sterile sheath used.  Sterile dressing applied  Dressing dated   Education provided to patient re: proper maintenance of line- pt verbalized understanding  Limb alert applied.

## 2024-03-28 NOTE — PLAN OF CARE
Problem: Occupational Therapy  Goal: Occupational Therapy Goal  Description: Goals to be met by: 4/26/24     Patient will increase functional independence with ADLs by performing:    UE Dressing with Supervision.  LE Dressing with Supervision.  Grooming while standing at sink with Supervision.  Toileting from toilet with Supervision for hygiene and clothing management.   Toilet transfer to toilet with Supervision.    Outcome: Ongoing, Progressing

## 2024-03-28 NOTE — SUBJECTIVE & OBJECTIVE
Interval History: Patient seen in bed.  NAD.  On 4 L nasal cannula.  Reports shortness of breath about the same.  She has been walking to the bathroom without issue.  Denies chest pain.     Review of Systems   Constitutional:  Positive for fatigue.   HENT:  Negative for congestion and sore throat.    Respiratory:  Positive for cough and shortness of breath.    Cardiovascular:  Positive for leg swelling. Negative for chest pain.   Gastrointestinal:  Negative for abdominal pain, nausea and vomiting.   Musculoskeletal:  Positive for myalgias.     Objective:     Vital Signs (Most Recent):  Temp: 99.1 °F (37.3 °C) (03/28/24 1147)  Pulse: 81 (03/28/24 1147)  Resp: 17 (03/28/24 1147)  BP: 120/61 (03/28/24 1147)  SpO2: 100 % (03/28/24 1147) Vital Signs (24h Range):  Temp:  [98.4 °F (36.9 °C)-99.8 °F (37.7 °C)] 99.1 °F (37.3 °C)  Pulse:  [75-98] 81  Resp:  [16-22] 17  SpO2:  [91 %-100 %] 100 %  BP: (101-127)/(60-79) 120/61     Weight: 113.4 kg (250 lb)  Body mass index is 44.29 kg/m².    Intake/Output Summary (Last 24 hours) at 3/28/2024 1332  Last data filed at 3/28/2024 1147  Gross per 24 hour   Intake 400 ml   Output --   Net 400 ml         Physical Exam  Vitals and nursing note reviewed.   Constitutional:       General: She is not in acute distress.  HENT:      Head: Normocephalic.      Mouth/Throat:      Mouth: Mucous membranes are moist.   Cardiovascular:      Rate and Rhythm: Normal rate and regular rhythm.   Pulmonary:      Effort: Pulmonary effort is normal. No respiratory distress.      Breath sounds: Rhonchi present.   Abdominal:      Palpations: Abdomen is soft.      Tenderness: There is no abdominal tenderness.   Musculoskeletal:      Right lower leg: Edema present.      Left lower leg: Edema present.      Comments: Soft brace to left forearm    Skin:     General: Skin is warm and dry.   Neurological:      General: No focal deficit present.      Mental Status: She is alert.             Significant Labs: All  "pertinent labs within the past 24 hours have been reviewed.  BMP:   Recent Labs   Lab 03/28/24  0506         K 3.3*      CO2 32*   BUN 7*   CREATININE 0.6   CALCIUM 8.5*   MG 1.8     CBC:   Recent Labs   Lab 03/27/24  0815 03/28/24  0506   WBC 6.78 4.86   HGB 10.0* 10.7*   HCT 31.2* 33.1*    204     CMP:   Recent Labs   Lab 03/27/24  0635 03/28/24  0506    137   K 3.8 3.3*    100   CO2 27 32*   * 104   BUN 8 7*   CREATININE 0.5 0.6   CALCIUM 8.4* 8.5*   PROT 5.7* 6.1   ALBUMIN 2.8* 3.0*   BILITOT 0.7 0.5   ALKPHOS 92 95   AST 20 18   ALT 7* 7*   ANIONGAP 4* 5*     Magnesium:   Recent Labs   Lab 03/27/24  0635 03/28/24  0506   MG 1.9 1.8     Troponin: No results for input(s): "TROPONINI", "TROPONINIHS" in the last 48 hours.    Significant Imaging: I have reviewed all pertinent imaging results/findings within the past 24 hours.  "

## 2024-03-28 NOTE — ASSESSMENT & PLAN NOTE
Chronic, controlled.  Latest blood pressure and vitals reviewed-     Temp:  [98.4 °F (36.9 °C)-99.8 °F (37.7 °C)]   Pulse:  [75-98]   Resp:  [16-22]   BP: (101-127)/(60-79)   SpO2:  [91 %-100 %] .   Home meds for hypertension were reviewed and noted below-  Hypertension Medications               quinapriL (ACCUPRIL) 40 MG tablet Take 40 mg by mouth once daily.            While in the hospital, will manage blood pressure as follows; Continue home antihypertensive regimen    Will utilize p.r.n. blood pressure medication only if patient's blood pressure greater than 180/110 and she develops symptoms such as worsening chest pain or shortness of breath.

## 2024-03-28 NOTE — ASSESSMENT & PLAN NOTE
Noted bilateral LE edema.  Does not seem to have pain.  US BLE negative for DVT   Recent CT chest with contrast showed multifocal pneumonia  Repeat dose of lasix 40 mg IV x 1

## 2024-03-28 NOTE — PROGRESS NOTES
[facility]  Progress Note    Patient Name: Megha Robb  MRN: [id number]  Patient Class: IP- Inpatient   Admission Date: 3/25/2024  Length of Stay: 3 days  Attending Physician: Juanita Alexandra MD  Primary Care Provider: Cyrus Patel MD (Inactive)        Subjective:     Principal Problem:Acute hypoxic respiratory failure        HPI:  This is a 68 y/o  female w/ pmh of left forearm fx (s/p recent surgery), HTN who p/w CC of SOB and cough. Patient reports 1-day h/o SOB associated w/ a dry cough; denies being on home O2. Patient denies headache, blurry vision, chest pain, abdominal pain, N/V and dysuria. Patient's tmax is 100; hemodynamically stable; requiring 4 L of O2 via NC. Procalcitonin noted to be elevated at 3.592. Patient tested negative for Influenza and Covid. CXR manifested bilateral nodular patchy parenchymal opacities which are new consider correlation with contrast-enhanced chest CT examination.     Overview/Hospital Course:  69 y.o. F with recent history of radial head fracture, presents with worsening shortness of breath. CT chest revealed findings suggestive of multifocal pneumonia, scattered bilateral nodular densities likely infectious/inflammatory process - recommend 3 month follow up CT after treatment. Patient noted to have allergy to PCN and vanc - therefore was started on Linezolid and cefepime. Patient required supplemental oxygen.  Blood cultures obtained, NGTD.  Sputum culture ordered and pending.  Consult to Ortho, patient is to follow up as an outpatient per Dr Fierro.      Interval History: Patient seen in bed.  NAD.  On 4 L nasal cannula.  Reports shortness of breath about the same.  She has been walking to the bathroom without issue.  Denies chest pain.   Give IV lasix 40 mg x 1 dose today.     Review of Systems   Constitutional:  Positive for fatigue.   HENT:  Negative for congestion and sore throat.    Respiratory:  Positive for  cough and shortness of breath.    Cardiovascular:  Positive for leg swelling. Negative for chest pain.   Gastrointestinal:  Negative for abdominal pain, nausea and vomiting.   Musculoskeletal:  Positive for myalgias.     Objective:     Vital Signs (Most Recent):  Temp: 99.1 °F (37.3 °C) (03/28/24 1147)  Pulse: 81 (03/28/24 1147)  Resp: 17 (03/28/24 1147)  BP: 120/61 (03/28/24 1147)  SpO2: 100 % (03/28/24 1147) Vital Signs (24h Range):  Temp:  [98.4 °F (36.9 °C)-99.8 °F (37.7 °C)] 99.1 °F (37.3 °C)  Pulse:  [75-98] 81  Resp:  [16-22] 17  SpO2:  [91 %-100 %] 100 %  BP: (101-127)/(60-79) 120/61     Weight: 113.4 kg (250 lb)  Body mass index is 44.29 kg/m².    Intake/Output Summary (Last 24 hours) at 3/28/2024 1332  Last data filed at 3/28/2024 1147  Gross per 24 hour   Intake 400 ml   Output --   Net 400 ml         Physical Exam  Vitals and nursing note reviewed.   Constitutional:       General: She is not in acute distress.  HENT:      Head: Normocephalic.      Mouth/Throat:      Mouth: Mucous membranes are moist.   Cardiovascular:      Rate and Rhythm: Normal rate and regular rhythm.   Pulmonary:      Effort: Pulmonary effort is normal. No respiratory distress.      Breath sounds: Rhonchi present.   Abdominal:      Palpations: Abdomen is soft.      Tenderness: There is no abdominal tenderness.   Musculoskeletal:      Right lower leg: Edema present.      Left lower leg: Edema present.      Comments: Soft brace to left forearm    Skin:     General: Skin is warm and dry.   Neurological:      General: No focal deficit present.      Mental Status: She is alert.             Significant Labs: All pertinent labs within the past 24 hours have been reviewed.  BMP:   Recent Labs   Lab 03/28/24  0506         K 3.3*      CO2 32*   BUN 7*   CREATININE 0.6   CALCIUM 8.5*   MG 1.8     CBC:   Recent Labs   Lab 03/27/24  0815 03/28/24  0506   WBC 6.78 4.86   HGB 10.0* 10.7*   HCT 31.2* 33.1*    204     CMP:  "  Recent Labs   Lab 03/27/24  0635 03/28/24  0506    137   K 3.8 3.3*    100   CO2 27 32*   * 104   BUN 8 7*   CREATININE 0.5 0.6   CALCIUM 8.4* 8.5*   PROT 5.7* 6.1   ALBUMIN 2.8* 3.0*   BILITOT 0.7 0.5   ALKPHOS 92 95   AST 20 18   ALT 7* 7*   ANIONGAP 4* 5*     Magnesium:   Recent Labs   Lab 03/27/24  0635 03/28/24  0506   MG 1.9 1.8     Troponin: No results for input(s): "TROPONINI", "TROPONINIHS" in the last 48 hours.    Significant Imaging: I have reviewed all pertinent imaging results/findings within the past 24 hours.    Assessment/Plan:      * Acute hypoxic respiratory failure secondary to Pneumonia  CT chest with multifocal PNA  Continue IV antibiotics   Blood cx x2- NGTD  Duonebs prn  Supplemental O2 as necessary and wean as tolerated  Antipyretic prn  Antitussive prn  Telemetry monitoring  Consider Pulmonology consult if no improvement  Monitor labs  Vital sign checks to help ensure hemodynamic stability    Bilateral lower extremity edema  Noted bilateral LE edema.  Does not seem to have pain.  US BLE negative for DVT   Recent CT chest with contrast showed multifocal pneumonia  Repeat dose of lasix 40 mg IV x 1      Multifocal pneumonia  IV cefepime, IV linezolid  Mucinex   Duonebs scheduled/PRN  Blood cultures NGTD  Sputum culture ordered and pending   Supplemental oxygen as needed  Repeat procalcitonin - downtrending       Status post surgery (recent left forearm surgery)  Analgesic prn  lost to follow  will reach out to ortho - patient may have been lost to follow - has left arm splint since Jan  Per Dr. Fierro, pt OK to f/u OP  Pt states surgery per Dr. Medley    Physical deconditioning  PT/OT  Maintain fall precautions      HTN (hypertension)  Chronic, controlled.  Latest blood pressure and vitals reviewed-     Temp:  [98.4 °F (36.9 °C)-99.8 °F (37.7 °C)]   Pulse:  [75-98]   Resp:  [16-22]   BP: (101-127)/(60-79)   SpO2:  [91 %-100 %] .   Home meds for hypertension were reviewed " "and noted below-  Hypertension Medications               quinapriL (ACCUPRIL) 40 MG tablet Take 40 mg by mouth once daily.            While in the hospital, will manage blood pressure as follows; Continue home antihypertensive regimen    Will utilize p.r.n. blood pressure medication only if patient's blood pressure greater than 180/110 and she develops symptoms such as worsening chest pain or shortness of breath.       VTE Risk Mitigation (From admission, onward)           Ordered     Place DAVON hose  Until discontinued         03/27/24 1510     enoxaparin injection 40 mg  Every 12 hours        Note to Pharmacy: Ht: 5' 3" (1.6 m)  Wt: 113.4 kg (250 lb)  Estimated Creatinine Clearance: 80.5 mL/min (based on SCr of 0.8 mg/dL).  Body mass index is 44.29 kg/m².    03/25/24 2252     IP VTE HIGH RISK PATIENT  Once         03/25/24 2244     Place sequential compression device  Until discontinued         03/25/24 2244                    Discharge Planning   JORGE L:      Code Status: Full Code   Is the patient medically ready for discharge?:     Reason for patient still in hospital (select all that apply): Patient trending condition and Treatment  Discharge Plan A: Home with family                  Pat Au NP  Department of Hospital Medicine   [facility]    "

## 2024-03-28 NOTE — PT/OT/SLP PROGRESS
Physical Therapy Treatment    Patient Name:  Megha Robb   MRN:  [id number]    Recommendations:     Discharge Recommendations: Low Intensity Therapy  Discharge Equipment Recommendations: none  Barriers to discharge:  respiratory deficits, decreased activity tolerance    Assessment:     Megha Robb is a 69 y.o. female admitted with a medical diagnosis of Acute hypoxic respiratory failure.  She presents with the following impairments/functional limitations: weakness, impaired endurance, impaired functional mobility, impaired self care skills, gait instability, decreased safety awareness, impaired cardiopulmonary response to activity.    RT present monitoring pt's O2 sats on RA with pt desatting. RT put pt on 2 L O2 with SPO2 95%.    Pt agreeable to visit. Pt independent for supine to sit transfer. Pt reports that she can get up and walk fine and that she walked to the bathroom earlier.    Pt contact guard assist for sit to stand transfer with RW. Pt ambulated 20' with RW and contact guard assist. Pt impulsive with poor RW management.     Pt noted to have increased audible breathing sounds with ambulation but denies any shortness of breath. Pt's SPO2 90% on 2 L O2 post ambulation. After seated rest break with verbal cuing for pursed lip breathing, SPO2 recovered to 94%.    Pt returned to supine independently. Visitors arrived at end of session.    Rehab Prognosis: Fair; patient would benefit from acute skilled PT services to address these deficits and reach maximum level of function.    Recent Surgery: * No surgery found *      Plan:     During this hospitalization, patient to be seen 5 x/week to address the identified rehab impairments via gait training, therapeutic activities, therapeutic exercises and progress toward the following goals:    Plan of Care Expires:  04/26/24    Subjective     Chief Complaint: pt c/o about having to spend the night in the ED  Patient/Family Comments/goals: to get  better  Pain/Comfort:  Pain Rating 1: 0/10      Objective:     Communicated with nurse prior to session.  Patient found HOB elevated with oxygen, peripheral IV, telemetry, bed alarm upon PT entry to room.     General Precautions: Standard, fall  Orthopedic Precautions: N/A  Braces: N/A  Respiratory Status: Nasal cannula, flow 2 L/min     Functional Mobility:  Bed Mobility:     Supine to Sit: independence  Sit to Supine: independence  Transfers:     Sit to Stand:  contact guard assistance with rolling walker  Gait: x 20' with RW and CGA, impulsive with poor RW management      AM-PAC 6 CLICK MOBILITY          Treatment & Education:  Pt educated on importance of time OOB, importance of intermittent mobility, safe techniques for transfers/ambulation, discharge recommendations/options, and use of call light for assistance and fall prevention.      Patient left HOB elevated with all lines intact, call button in reach, bed alarm on, and visitors present..    GOALS:   Multidisciplinary Problems       Physical Therapy Goals          Problem: Physical Therapy    Goal Priority Disciplines Outcome Goal Variances Interventions   Physical Therapy Goal     PT, PT/OT      Description: Goals to be met by: 24     Patient will increase functional independence with mobility by performin. Supine to sit with Supervision  2. Sit to stand transfer with Supervision  3. Bed to chair transfer with Supervision using Rolling Walker  4. Gait  x 150 feet with Supervision using Rolling Walker.                              Time Tracking:     PT Received On: 24  PT Start Time: 1127     PT Stop Time: 1144  PT Total Time (min): 17 min     Billable Minutes: Gait Training 17    Treatment Type: Treatment  PT/PTA: PTA     Number of PTA visits since last PT visit: 2024

## 2024-03-28 NOTE — CARE UPDATE
03/28/24 0810   Patient Assessment/Suction   Level of Consciousness (AVPU) alert   Respiratory Effort Normal;Unlabored   Expansion/Accessory Muscles/Retractions no use of accessory muscles;no retractions;expansion symmetric   All Lung Fields Breath Sounds diminished   Rhythm/Pattern, Respiratory shortness of breath;no shortness of breath reported;depth regular;pattern regular;unlabored   Cough Frequency frequent   Cough Type nonproductive   PRE-TX-O2   Device (Oxygen Therapy) nasal cannula   $ Is the patient on Low Flow Oxygen? Yes   Flow (L/min) 3  (decreased to 2 LPM)   SpO2 97 %   Pulse Oximetry Type Intermittent   $ Pulse Oximetry - Multiple Charge Pulse Oximetry - Multiple   Pulse 82   Resp 18   Positioning HOB elevated 30 degrees   Aerosol Therapy   $ Aerosol Therapy Charges Aerosol Treatment   Respiratory Treatment Status (SVN) given   Treatment Route (SVN) mask;oxygen   Patient Position (SVN) HOB elevated   Post Treatment Assessment (SVN) increased aeration   Signs of Intolerance (SVN) none

## 2024-03-28 NOTE — ASSESSMENT & PLAN NOTE
CT chest with multifocal PNA  Continue IV antibiotics   Blood cx x2- NGTD  Duonebs prn  Supplemental O2 as necessary and wean as tolerated  Antipyretic prn  Antitussive prn  Telemetry monitoring  Consider Pulmonology consult if no improvement  Monitor labs  Vital sign checks to help ensure hemodynamic stability

## 2024-03-29 LAB
ALBUMIN SERPL BCP-MCNC: 3.1 G/DL (ref 3.5–5.2)
ALP SERPL-CCNC: 95 U/L (ref 55–135)
ALT SERPL W/O P-5'-P-CCNC: 9 U/L (ref 10–44)
ANION GAP SERPL CALC-SCNC: 6 MMOL/L (ref 8–16)
AST SERPL-CCNC: 27 U/L (ref 10–40)
BASOPHILS # BLD AUTO: 0.02 K/UL (ref 0–0.2)
BASOPHILS NFR BLD: 0.4 % (ref 0–1.9)
BILIRUB SERPL-MCNC: 0.4 MG/DL (ref 0.1–1)
BUN SERPL-MCNC: 6 MG/DL (ref 8–23)
CALCIUM SERPL-MCNC: 8.7 MG/DL (ref 8.7–10.5)
CHLORIDE SERPL-SCNC: 96 MMOL/L (ref 95–110)
CO2 SERPL-SCNC: 33 MMOL/L (ref 23–29)
CREAT SERPL-MCNC: 0.6 MG/DL (ref 0.5–1.4)
DIFFERENTIAL METHOD BLD: ABNORMAL
EOSINOPHIL # BLD AUTO: 0.3 K/UL (ref 0–0.5)
EOSINOPHIL NFR BLD: 5.1 % (ref 0–8)
ERYTHROCYTE [DISTWIDTH] IN BLOOD BY AUTOMATED COUNT: 13.8 % (ref 11.5–14.5)
EST. GFR  (NO RACE VARIABLE): >60 ML/MIN/1.73 M^2
GLUCOSE SERPL-MCNC: 130 MG/DL (ref 70–110)
HCT VFR BLD AUTO: 34.3 % (ref 37–48.5)
HGB BLD-MCNC: 11.2 G/DL (ref 12–16)
IMM GRANULOCYTES # BLD AUTO: 0.03 K/UL (ref 0–0.04)
IMM GRANULOCYTES NFR BLD AUTO: 0.6 % (ref 0–0.5)
LYMPHOCYTES # BLD AUTO: 0.7 K/UL (ref 1–4.8)
LYMPHOCYTES NFR BLD: 13.7 % (ref 18–48)
MAGNESIUM SERPL-MCNC: 1.8 MG/DL (ref 1.6–2.6)
MCH RBC QN AUTO: 31.2 PG (ref 27–31)
MCHC RBC AUTO-ENTMCNC: 32.7 G/DL (ref 32–36)
MCV RBC AUTO: 96 FL (ref 82–98)
MONOCYTES # BLD AUTO: 0.7 K/UL (ref 0.3–1)
MONOCYTES NFR BLD: 13.7 % (ref 4–15)
MRSA SCREEN BY PCR: NEGATIVE
NEUTROPHILS # BLD AUTO: 3.6 K/UL (ref 1.8–7.7)
NEUTROPHILS NFR BLD: 66.5 % (ref 38–73)
NRBC BLD-RTO: 0 /100 WBC
PLATELET # BLD AUTO: 203 K/UL (ref 150–450)
PMV BLD AUTO: 9.2 FL (ref 9.2–12.9)
POTASSIUM SERPL-SCNC: 3.3 MMOL/L (ref 3.5–5.1)
PROT SERPL-MCNC: 6.7 G/DL (ref 6–8.4)
RBC # BLD AUTO: 3.59 M/UL (ref 4–5.4)
SODIUM SERPL-SCNC: 135 MMOL/L (ref 136–145)
WBC # BLD AUTO: 5.33 K/UL (ref 3.9–12.7)

## 2024-03-29 PROCEDURE — 94799 UNLISTED PULMONARY SVC/PX: CPT | Mod: XB

## 2024-03-29 PROCEDURE — 99900031 HC PATIENT EDUCATION (STAT)

## 2024-03-29 PROCEDURE — 36415 COLL VENOUS BLD VENIPUNCTURE: CPT | Performed by: INTERNAL MEDICINE

## 2024-03-29 PROCEDURE — 99900035 HC TECH TIME PER 15 MIN (STAT)

## 2024-03-29 PROCEDURE — 25000242 PHARM REV CODE 250 ALT 637 W/ HCPCS: Performed by: NURSE PRACTITIONER

## 2024-03-29 PROCEDURE — 25000003 PHARM REV CODE 250: Performed by: NURSE PRACTITIONER

## 2024-03-29 PROCEDURE — 85025 COMPLETE CBC W/AUTO DIFF WBC: CPT | Performed by: INTERNAL MEDICINE

## 2024-03-29 PROCEDURE — 87641 MR-STAPH DNA AMP PROBE: CPT | Performed by: INTERNAL MEDICINE

## 2024-03-29 PROCEDURE — 12000002 HC ACUTE/MED SURGE SEMI-PRIVATE ROOM

## 2024-03-29 PROCEDURE — 94760 N-INVAS EAR/PLS OXIMETRY 1: CPT

## 2024-03-29 PROCEDURE — 63600175 PHARM REV CODE 636 W HCPCS: Performed by: INTERNAL MEDICINE

## 2024-03-29 PROCEDURE — 27000221 HC OXYGEN, UP TO 24 HOURS

## 2024-03-29 PROCEDURE — 94640 AIRWAY INHALATION TREATMENT: CPT

## 2024-03-29 PROCEDURE — 25000003 PHARM REV CODE 250: Performed by: INTERNAL MEDICINE

## 2024-03-29 PROCEDURE — 83735 ASSAY OF MAGNESIUM: CPT | Performed by: INTERNAL MEDICINE

## 2024-03-29 PROCEDURE — 63600175 PHARM REV CODE 636 W HCPCS: Performed by: NURSE PRACTITIONER

## 2024-03-29 PROCEDURE — 94761 N-INVAS EAR/PLS OXIMETRY MLT: CPT

## 2024-03-29 PROCEDURE — 80053 COMPREHEN METABOLIC PANEL: CPT | Performed by: INTERNAL MEDICINE

## 2024-03-29 RX ORDER — PREDNISONE 20 MG/1
20 TABLET ORAL DAILY
Status: DISCONTINUED | OUTPATIENT
Start: 2024-03-29 | End: 2024-04-01 | Stop reason: HOSPADM

## 2024-03-29 RX ADMIN — NYSTATIN 500000 UNITS: 100000 SUSPENSION ORAL at 12:03

## 2024-03-29 RX ADMIN — NYSTATIN 500000 UNITS: 100000 SUSPENSION ORAL at 04:03

## 2024-03-29 RX ADMIN — PANTOPRAZOLE SODIUM 40 MG: 40 TABLET, DELAYED RELEASE ORAL at 09:03

## 2024-03-29 RX ADMIN — HYDROCODONE BITARTRATE AND ACETAMINOPHEN 1 TABLET: 5; 325 TABLET ORAL at 11:03

## 2024-03-29 RX ADMIN — NYSTATIN 500000 UNITS: 100000 SUSPENSION ORAL at 09:03

## 2024-03-29 RX ADMIN — IPRATROPIUM BROMIDE AND ALBUTEROL SULFATE 3 ML: 2.5; .5 SOLUTION RESPIRATORY (INHALATION) at 07:03

## 2024-03-29 RX ADMIN — ENOXAPARIN SODIUM 40 MG: 40 INJECTION SUBCUTANEOUS at 09:03

## 2024-03-29 RX ADMIN — FERROUS SULFATE TAB 325 MG (65 MG ELEMENTAL FE) 1 EACH: 325 (65 FE) TAB at 09:03

## 2024-03-29 RX ADMIN — PREGABALIN 200 MG: 75 CAPSULE ORAL at 03:03

## 2024-03-29 RX ADMIN — PREDNISONE 20 MG: 20 TABLET ORAL at 12:03

## 2024-03-29 RX ADMIN — GUAIFENESIN 600 MG: 600 TABLET, EXTENDED RELEASE ORAL at 09:03

## 2024-03-29 RX ADMIN — CEFEPIME 1 G: 1 INJECTION, POWDER, FOR SOLUTION INTRAMUSCULAR; INTRAVENOUS at 05:03

## 2024-03-29 RX ADMIN — SUCRALFATE 1 G: 1 TABLET ORAL at 06:03

## 2024-03-29 RX ADMIN — LINEZOLID 600 MG: 600 INJECTION, SOLUTION INTRAVENOUS at 10:03

## 2024-03-29 RX ADMIN — SUCRALFATE 1 G: 1 TABLET ORAL at 04:03

## 2024-03-29 RX ADMIN — AZITHROMYCIN DIHYDRATE 500 MG: 500 INJECTION, POWDER, LYOPHILIZED, FOR SOLUTION INTRAVENOUS at 12:03

## 2024-03-29 RX ADMIN — IPRATROPIUM BROMIDE AND ALBUTEROL SULFATE 3 ML: 2.5; .5 SOLUTION RESPIRATORY (INHALATION) at 01:03

## 2024-03-29 RX ADMIN — PREGABALIN 200 MG: 75 CAPSULE ORAL at 09:03

## 2024-03-29 RX ADMIN — CEFTRIAXONE SODIUM 1 G: 1 INJECTION, POWDER, FOR SOLUTION INTRAMUSCULAR; INTRAVENOUS at 12:03

## 2024-03-29 RX ADMIN — LISINOPRIL 40 MG: 20 TABLET ORAL at 09:03

## 2024-03-29 RX ADMIN — SUCRALFATE 1 G: 1 TABLET ORAL at 10:03

## 2024-03-29 RX ADMIN — SUCRALFATE 1 G: 1 TABLET ORAL at 09:03

## 2024-03-29 RX ADMIN — IPRATROPIUM BROMIDE AND ALBUTEROL SULFATE 3 ML: 2.5; .5 SOLUTION RESPIRATORY (INHALATION) at 12:03

## 2024-03-29 NOTE — PLAN OF CARE
Problem: Adult Inpatient Plan of Care  Goal: Patient-Specific Goal (Individualized)  Outcome: Ongoing, Progressing  Goal: Absence of Hospital-Acquired Illness or Injury  Outcome: Ongoing, Progressing  Goal: Optimal Comfort and Wellbeing  Outcome: Ongoing, Progressing     Problem: Respiratory Compromise (Pneumonia)  Goal: Effective Oxygenation and Ventilation  Outcome: Ongoing, Progressing     Problem: Fall Injury Risk  Goal: Absence of Fall and Fall-Related Injury  Outcome: Adequate for Care Transition

## 2024-03-29 NOTE — ASSESSMENT & PLAN NOTE
IV antibiotics   Mucinex   Duonebs scheduled/PRN  Blood cultures NGTD  Sputum culture ordered and pending   Supplemental oxygen as needed  Repeat procalcitonin - downtrending  IS at bedside   Add short course of prednisone

## 2024-03-29 NOTE — SUBJECTIVE & OBJECTIVE
Interval History: Patient seen in bed.  NAD.  O2 down to 1-2 L nasal cannula.  Patient still with cough but reports improvement in shortness of breath.       Review of Systems   Constitutional:  Positive for fatigue.   HENT:  Negative for congestion and sore throat.    Respiratory:  Positive for cough, shortness of breath (improving) and wheezing.    Cardiovascular:  Positive for leg swelling. Negative for chest pain.   Gastrointestinal:  Negative for abdominal pain, nausea and vomiting.   Musculoskeletal:  Positive for myalgias.     Objective:     Vital Signs (Most Recent):  Temp: 98.1 °F (36.7 °C) (03/29/24 1128)  Pulse: 89 (03/29/24 1128)  Resp: 18 (03/29/24 1128)  BP: (!) 100/58 (03/29/24 1128)  SpO2: (!) 94 % (03/29/24 1128) Vital Signs (24h Range):  Temp:  [98.1 °F (36.7 °C)-99 °F (37.2 °C)] 98.1 °F (36.7 °C)  Pulse:  [77-89] 89  Resp:  [16-20] 18  SpO2:  [92 %-100 %] 94 %  BP: (100-131)/(57-68) 100/58     Weight: 113.4 kg (250 lb)  Body mass index is 44.29 kg/m².    Intake/Output Summary (Last 24 hours) at 3/29/2024 1237  Last data filed at 3/29/2024 0900  Gross per 24 hour   Intake 1180 ml   Output --   Net 1180 ml           Physical Exam  Vitals and nursing note reviewed.   Constitutional:       General: She is not in acute distress.  HENT:      Head: Normocephalic.      Mouth/Throat:      Mouth: Mucous membranes are moist.   Cardiovascular:      Rate and Rhythm: Normal rate and regular rhythm.   Pulmonary:      Effort: Pulmonary effort is normal. No respiratory distress.      Breath sounds: Rhonchi present.   Abdominal:      Palpations: Abdomen is soft.      Tenderness: There is no abdominal tenderness.   Musculoskeletal:      Right lower leg: Edema present.      Left lower leg: Edema present.      Comments: Ace wrap to left forearm   Skin:     General: Skin is warm and dry.   Neurological:      General: No focal deficit present.      Mental Status: She is alert.             Significant Labs: All pertinent  "labs within the past 24 hours have been reviewed.  BMP:   Recent Labs   Lab 03/29/24  0545   *   *   K 3.3*   CL 96   CO2 33*   BUN 6*   CREATININE 0.6   CALCIUM 8.7   MG 1.8       CBC:   Recent Labs   Lab 03/28/24  0506 03/29/24  0545   WBC 4.86 5.33   HGB 10.7* 11.2*   HCT 33.1* 34.3*    203       CMP:   Recent Labs   Lab 03/28/24  0506 03/29/24  0545    135*   K 3.3* 3.3*    96   CO2 32* 33*    130*   BUN 7* 6*   CREATININE 0.6 0.6   CALCIUM 8.5* 8.7   PROT 6.1 6.7   ALBUMIN 3.0* 3.1*   BILITOT 0.5 0.4   ALKPHOS 95 95   AST 18 27   ALT 7* 9*   ANIONGAP 5* 6*       Magnesium:   Recent Labs   Lab 03/28/24  0506 03/29/24  0545   MG 1.8 1.8       Troponin: No results for input(s): "TROPONINI", "TROPONINIHS" in the last 48 hours.    Significant Imaging: I have reviewed all pertinent imaging results/findings within the past 24 hours.  "

## 2024-03-29 NOTE — NURSING
"Pt. Seen for wound consult.     Pt. AA&O x 4. Scabs noted to the Moe Hips, Lt. Knee and Rt. Lateral thigh.Pt.reports multiple surgeries over the years and has scarring to these areas.  Reports that "I picked them but they don't itch".  Spoke with pt. regarding hand hygiene and importance of decreasing the risk of getting areas infected. She is agreeable for me to cover these.     Cleansed with NS and gauze.  Patted dry with gauze.  Covered with 1 piece of non-adherent Foam to each area and secured with paper tape.                 "

## 2024-03-29 NOTE — PROGRESS NOTES
[facility]  Progress Note    Patient Name: Megha Robb  MRN: [id number]  Patient Class: IP- Inpatient   Admission Date: 3/25/2024  Length of Stay: 4 days  Attending Physician: Juanita Alexandar MD  Primary Care Provider: Cyrus Patel MD (Inactive)        Subjective:     Principal Problem:Acute hypoxic respiratory failure        HPI:  This is a 68 y/o  female w/ pmh of left forearm fx (s/p recent surgery), HTN who p/w CC of SOB and cough. Patient reports 1-day h/o SOB associated w/ a dry cough; denies being on home O2. Patient denies headache, blurry vision, chest pain, abdominal pain, N/V and dysuria. Patient's tmax is 100; hemodynamically stable; requiring 4 L of O2 via NC. Procalcitonin noted to be elevated at 3.592. Patient tested negative for Influenza and Covid. CXR manifested bilateral nodular patchy parenchymal opacities which are new consider correlation with contrast-enhanced chest CT examination.     Overview/Hospital Course:  69 y.o. F with recent history of radial head fracture, presents with worsening shortness of breath. CT chest revealed findings suggestive of multifocal pneumonia, scattered bilateral nodular densities likely infectious/inflammatory process - recommend 3 month follow up CT after treatment. Patient noted to have allergy to PCN and vanc - therefore was started on Linezolid and cefepime. Patient required supplemental oxygen.  Blood cultures obtained, NGTD.  Sputum culture ordered and pending.  Consult to Ortho, patient is to follow up as an outpatient per Dr Fierro.  Respiratory status slowly improving, supplemental oxygen has been titrated down to 2 L.  Will obtain MRSA nasal swab.  Prednisone added.  IS added.     Interval History: Patient seen in bed.  NAD.  O2 down to 1-2 L nasal cannula.  Patient still with cough but reports improvement in shortness of breath.       Review of Systems   Constitutional:  Positive for fatigue.    HENT:  Negative for congestion and sore throat.    Respiratory:  Positive for cough, shortness of breath (improving) and wheezing.    Cardiovascular:  Positive for leg swelling. Negative for chest pain.   Gastrointestinal:  Negative for abdominal pain, nausea and vomiting.   Musculoskeletal:  Positive for myalgias.     Objective:     Vital Signs (Most Recent):  Temp: 98.1 °F (36.7 °C) (03/29/24 1128)  Pulse: 89 (03/29/24 1128)  Resp: 18 (03/29/24 1128)  BP: (!) 100/58 (03/29/24 1128)  SpO2: (!) 94 % (03/29/24 1128) Vital Signs (24h Range):  Temp:  [98.1 °F (36.7 °C)-99 °F (37.2 °C)] 98.1 °F (36.7 °C)  Pulse:  [77-89] 89  Resp:  [16-20] 18  SpO2:  [92 %-100 %] 94 %  BP: (100-131)/(57-68) 100/58     Weight: 113.4 kg (250 lb)  Body mass index is 44.29 kg/m².    Intake/Output Summary (Last 24 hours) at 3/29/2024 1237  Last data filed at 3/29/2024 0900  Gross per 24 hour   Intake 1180 ml   Output --   Net 1180 ml           Physical Exam  Vitals and nursing note reviewed.   Constitutional:       General: She is not in acute distress.  HENT:      Head: Normocephalic.      Mouth/Throat:      Mouth: Mucous membranes are moist.   Cardiovascular:      Rate and Rhythm: Normal rate and regular rhythm.   Pulmonary:      Effort: Pulmonary effort is normal. No respiratory distress.      Breath sounds: Rhonchi present.   Abdominal:      Palpations: Abdomen is soft.      Tenderness: There is no abdominal tenderness.   Musculoskeletal:      Right lower leg: Edema present.      Left lower leg: Edema present.      Comments: Ace wrap to left forearm   Skin:     General: Skin is warm and dry.   Neurological:      General: No focal deficit present.      Mental Status: She is alert.             Significant Labs: All pertinent labs within the past 24 hours have been reviewed.  BMP:   Recent Labs   Lab 03/29/24  0545   *   *   K 3.3*   CL 96   CO2 33*   BUN 6*   CREATININE 0.6   CALCIUM 8.7   MG 1.8       CBC:   Recent Labs  "  Lab 03/28/24  0506 03/29/24  0545   WBC 4.86 5.33   HGB 10.7* 11.2*   HCT 33.1* 34.3*    203       CMP:   Recent Labs   Lab 03/28/24  0506 03/29/24  0545    135*   K 3.3* 3.3*    96   CO2 32* 33*    130*   BUN 7* 6*   CREATININE 0.6 0.6   CALCIUM 8.5* 8.7   PROT 6.1 6.7   ALBUMIN 3.0* 3.1*   BILITOT 0.5 0.4   ALKPHOS 95 95   AST 18 27   ALT 7* 9*   ANIONGAP 5* 6*       Magnesium:   Recent Labs   Lab 03/28/24  0506 03/29/24  0545   MG 1.8 1.8       Troponin: No results for input(s): "TROPONINI", "TROPONINIHS" in the last 48 hours.    Significant Imaging: I have reviewed all pertinent imaging results/findings within the past 24 hours.    Assessment/Plan:      * Acute hypoxic respiratory failure secondary to Pneumonia  CT chest with multifocal PNA  Continue IV antibiotics   Blood cx x2- NGTD  Duonebs prn  Supplemental O2 as necessary and wean as tolerated  Antipyretic prn  Antitussive prn  Telemetry monitoring  Consider Pulmonology consult if no improvement  Monitor labs  Vital sign checks to help ensure hemodynamic stability    Bilateral lower extremity edema  Noted bilateral LE edema.  Does not seem to have pain.  US BLE negative for DVT   Recent CT chest with contrast showed multifocal pneumonia  Dose of IV lasix given      Multifocal pneumonia  IV antibiotics   Mucinex   Duonebs scheduled/PRN  Blood cultures NGTD  Sputum culture ordered and pending   Supplemental oxygen as needed  Repeat procalcitonin - downtrending  IS at bedside   Add short course of prednisone        Status post surgery (recent left forearm surgery)  Analgesic prn  lost to follow  will reach out to ortho - patient may have been lost to follow - has left arm splint since Jan  Per Dr. Fierro, pt OK to f/u OP  Pt states surgery per Dr. Medley    Physical deconditioning  PT/OT  Maintain fall precautions      HTN (hypertension)  Chronic, controlled.  Latest blood pressure and vitals reviewed-     Temp:  [98.1 °F (36.7 " "°C)-99 °F (37.2 °C)]   Pulse:  [77-89]   Resp:  [16-20]   BP: (100-131)/(57-68)   SpO2:  [92 %-100 %] .   Home meds for hypertension were reviewed and noted below-  Hypertension Medications               quinapriL (ACCUPRIL) 40 MG tablet Take 40 mg by mouth once daily.            While in the hospital, will manage blood pressure as follows; Continue home antihypertensive regimen    Will utilize p.r.n. blood pressure medication only if patient's blood pressure greater than 180/110 and she develops symptoms such as worsening chest pain or shortness of breath.       VTE Risk Mitigation (From admission, onward)           Ordered     Place DAVON hose  Until discontinued         03/27/24 1510     enoxaparin injection 40 mg  Every 12 hours        Note to Pharmacy: Ht: 5' 3" (1.6 m)  Wt: 113.4 kg (250 lb)  Estimated Creatinine Clearance: 80.5 mL/min (based on SCr of 0.8 mg/dL).  Body mass index is 44.29 kg/m².    03/25/24 2252     IP VTE HIGH RISK PATIENT  Once         03/25/24 2244     Place sequential compression device  Until discontinued         03/25/24 2244                    Discharge Planning   JORGE L:      Code Status: Full Code   Is the patient medically ready for discharge?:     Reason for patient still in hospital (select all that apply): Patient trending condition  Discharge Plan A: Home with family                  Pat Au NP  Department of Hospital Medicine   [facility]    "

## 2024-03-29 NOTE — CARE UPDATE
03/29/24 0128   Patient Assessment/Suction   Level of Consciousness (AVPU) alert   Respiratory Effort Normal;Unlabored   Expansion/Accessory Muscles/Retractions expansion symmetric;no retractions;no use of accessory muscles   BETH Breath Sounds clear   LLL Breath Sounds diminished   RUL Breath Sounds clear   RML Breath Sounds crackles, fine   RLL Breath Sounds crackles, fine;diminished   Rhythm/Pattern, Respiratory pattern regular;unlabored;depth regular   Cough Frequency no cough   PRE-TX-O2   Device (Oxygen Therapy) nasal cannula   $ Is the patient on Low Flow Oxygen? Yes   Flow (L/min) 2   SpO2 97 %   Pulse Oximetry Type Intermittent   $ Pulse Oximetry - Single Charge Pulse Oximetry - Single   Aerosol Therapy   $ Aerosol Therapy Charges Aerosol Treatment   Daily Review of Necessity (SVN) completed   Respiratory Treatment Status (SVN) given   Treatment Route (SVN) mask;oxygen   Patient Position (SVN) Andersen's   Signs of Intolerance (SVN) none

## 2024-03-29 NOTE — ASSESSMENT & PLAN NOTE
Chronic, controlled.  Latest blood pressure and vitals reviewed-     Temp:  [98.1 °F (36.7 °C)-99 °F (37.2 °C)]   Pulse:  [77-89]   Resp:  [16-20]   BP: (100-131)/(57-68)   SpO2:  [92 %-100 %] .   Home meds for hypertension were reviewed and noted below-  Hypertension Medications               quinapriL (ACCUPRIL) 40 MG tablet Take 40 mg by mouth once daily.            While in the hospital, will manage blood pressure as follows; Continue home antihypertensive regimen    Will utilize p.r.n. blood pressure medication only if patient's blood pressure greater than 180/110 and she develops symptoms such as worsening chest pain or shortness of breath.

## 2024-03-29 NOTE — RESPIRATORY THERAPY
03/28/24 1913   Patient Assessment/Suction   Level of Consciousness (AVPU) alert   Respiratory Effort Normal;Unlabored   Expansion/Accessory Muscles/Retractions no retractions;no use of accessory muscles   All Lung Fields Breath Sounds Anterior:;Posterior:;Lateral:;equal bilaterally;diminished;crackles, fine   LLL Breath Sounds crackles, fine   RML Breath Sounds crackles, fine   RLL Breath Sounds crackles, fine   Rhythm/Pattern, Respiratory depth regular;pattern regular;unlabored   Cough Frequency frequent   Cough Type dry;good;nonproductive   Skin Integrity   $ Wound Care Tech Time 15 min   Area Observed Left;Right;Behind ear;Cheek;Upper lip;Nares   Skin Appearance without discoloration   PRE-TX-O2   Device (Oxygen Therapy) nasal cannula   $ Is the patient on Low Flow Oxygen? Yes   Flow (L/min) 2   Oxygen Concentration (%) 28   SpO2 (!) 92 %   Pulse Oximetry Type Intermittent   $ Pulse Oximetry - Multiple Charge Pulse Oximetry - Multiple   Pulse 87   Resp 19   Aerosol Therapy   $ Aerosol Therapy Charges Aerosol Treatment   Daily Review of Necessity (SVN) completed   Respiratory Treatment Status (SVN) given   Treatment Route (SVN) oxygen;mask   Patient Position (SVN) Andersen's   Post Treatment Assessment (SVN) breath sounds improved   Signs of Intolerance (SVN) none   Breath Sounds Post-Respiratory Treatment   Throughout All Fields Post-Treatment All Fields   Throughout All Fields Post-Treatment aeration increased   Post-treatment Heart Rate (beats/min) 79   Post-treatment Resp Rate (breaths/min) 18   Education   $ Education 15 min

## 2024-03-29 NOTE — ASSESSMENT & PLAN NOTE
Noted bilateral LE edema.  Does not seem to have pain.  US BLE negative for DVT   Recent CT chest with contrast showed multifocal pneumonia  Dose of IV lasix given

## 2024-03-30 LAB
ADENOVIRUS: NOT DETECTED
ALBUMIN SERPL BCP-MCNC: 3 G/DL (ref 3.5–5.2)
ALP SERPL-CCNC: 84 U/L (ref 55–135)
ALT SERPL W/O P-5'-P-CCNC: 10 U/L (ref 10–44)
ANION GAP SERPL CALC-SCNC: 5 MMOL/L (ref 8–16)
AST SERPL-CCNC: 28 U/L (ref 10–40)
BACTERIA BLD CULT: NORMAL
BACTERIA BLD CULT: NORMAL
BASOPHILS # BLD AUTO: 0.02 K/UL (ref 0–0.2)
BASOPHILS NFR BLD: 0.4 % (ref 0–1.9)
BILIRUB SERPL-MCNC: 0.3 MG/DL (ref 0.1–1)
BORDETELLA PARAPERTUSSIS (IS1001): NOT DETECTED
BORDETELLA PERTUSSIS (PTXP): NOT DETECTED
BUN SERPL-MCNC: 9 MG/DL (ref 8–23)
CALCIUM SERPL-MCNC: 9 MG/DL (ref 8.7–10.5)
CHLAMYDIA PNEUMONIAE: NOT DETECTED
CHLORIDE SERPL-SCNC: 102 MMOL/L (ref 95–110)
CO2 SERPL-SCNC: 33 MMOL/L (ref 23–29)
CORONAVIRUS 229E, COMMON COLD VIRUS: NOT DETECTED
CORONAVIRUS HKU1, COMMON COLD VIRUS: NOT DETECTED
CORONAVIRUS NL63, COMMON COLD VIRUS: NOT DETECTED
CORONAVIRUS OC43, COMMON COLD VIRUS: NOT DETECTED
CREAT SERPL-MCNC: 0.5 MG/DL (ref 0.5–1.4)
DIFFERENTIAL METHOD BLD: ABNORMAL
EOSINOPHIL # BLD AUTO: 0.1 K/UL (ref 0–0.5)
EOSINOPHIL NFR BLD: 2.2 % (ref 0–8)
ERYTHROCYTE [DISTWIDTH] IN BLOOD BY AUTOMATED COUNT: 13.9 % (ref 11.5–14.5)
EST. GFR  (NO RACE VARIABLE): >60 ML/MIN/1.73 M^2
FLUBV RNA NPH QL NAA+NON-PROBE: NOT DETECTED
GLUCOSE SERPL-MCNC: 134 MG/DL (ref 70–110)
GLUCOSE SERPL-MCNC: 98 MG/DL (ref 70–110)
GLUCOSE SERPL-MCNC: 99 MG/DL (ref 70–110)
HCT VFR BLD AUTO: 34.9 % (ref 37–48.5)
HGB BLD-MCNC: 11 G/DL (ref 12–16)
HPIV1 RNA NPH QL NAA+NON-PROBE: NOT DETECTED
HPIV2 RNA NPH QL NAA+NON-PROBE: NOT DETECTED
HPIV3 RNA NPH QL NAA+NON-PROBE: NOT DETECTED
HPIV4 RNA NPH QL NAA+NON-PROBE: NOT DETECTED
HUMAN METAPNEUMOVIRUS: NOT DETECTED
IMM GRANULOCYTES # BLD AUTO: 0.08 K/UL (ref 0–0.04)
IMM GRANULOCYTES NFR BLD AUTO: 1.4 % (ref 0–0.5)
INFLUENZA A (SUBTYPES H1,H1-2009,H3): NOT DETECTED
LYMPHOCYTES # BLD AUTO: 1.4 K/UL (ref 1–4.8)
LYMPHOCYTES NFR BLD: 24.5 % (ref 18–48)
MAGNESIUM SERPL-MCNC: 2.2 MG/DL (ref 1.6–2.6)
MCH RBC QN AUTO: 31.2 PG (ref 27–31)
MCHC RBC AUTO-ENTMCNC: 31.5 G/DL (ref 32–36)
MCV RBC AUTO: 99 FL (ref 82–98)
MONOCYTES # BLD AUTO: 0.6 K/UL (ref 0.3–1)
MONOCYTES NFR BLD: 11.2 % (ref 4–15)
MYCOPLASMA PNEUMONIAE: NOT DETECTED
NEUTROPHILS # BLD AUTO: 3.3 K/UL (ref 1.8–7.7)
NEUTROPHILS NFR BLD: 60.3 % (ref 38–73)
NRBC BLD-RTO: 0 /100 WBC
PLATELET # BLD AUTO: 199 K/UL (ref 150–450)
PMV BLD AUTO: 8.9 FL (ref 9.2–12.9)
POTASSIUM SERPL-SCNC: 3.7 MMOL/L (ref 3.5–5.1)
PROT SERPL-MCNC: 6.5 G/DL (ref 6–8.4)
RBC # BLD AUTO: 3.53 M/UL (ref 4–5.4)
RESPIRATORY INFECTION PANEL SOURCE: NORMAL
RSV RNA NPH QL NAA+NON-PROBE: NOT DETECTED
RV+EV RNA NPH QL NAA+NON-PROBE: NOT DETECTED
SARS-COV-2 RNA RESP QL NAA+PROBE: NOT DETECTED
SODIUM SERPL-SCNC: 140 MMOL/L (ref 136–145)
WBC # BLD AUTO: 5.54 K/UL (ref 3.9–12.7)

## 2024-03-30 PROCEDURE — 63600175 PHARM REV CODE 636 W HCPCS: Performed by: INTERNAL MEDICINE

## 2024-03-30 PROCEDURE — 94640 AIRWAY INHALATION TREATMENT: CPT

## 2024-03-30 PROCEDURE — 94799 UNLISTED PULMONARY SVC/PX: CPT | Mod: XB

## 2024-03-30 PROCEDURE — 87633 RESP VIRUS 12-25 TARGETS: CPT | Performed by: NURSE PRACTITIONER

## 2024-03-30 PROCEDURE — 94761 N-INVAS EAR/PLS OXIMETRY MLT: CPT

## 2024-03-30 PROCEDURE — 27000221 HC OXYGEN, UP TO 24 HOURS

## 2024-03-30 PROCEDURE — 99900035 HC TECH TIME PER 15 MIN (STAT)

## 2024-03-30 PROCEDURE — 83735 ASSAY OF MAGNESIUM: CPT | Performed by: INTERNAL MEDICINE

## 2024-03-30 PROCEDURE — 99900031 HC PATIENT EDUCATION (STAT)

## 2024-03-30 PROCEDURE — 85025 COMPLETE CBC W/AUTO DIFF WBC: CPT | Performed by: INTERNAL MEDICINE

## 2024-03-30 PROCEDURE — 25000242 PHARM REV CODE 250 ALT 637 W/ HCPCS: Performed by: NURSE PRACTITIONER

## 2024-03-30 PROCEDURE — 94760 N-INVAS EAR/PLS OXIMETRY 1: CPT

## 2024-03-30 PROCEDURE — 36415 COLL VENOUS BLD VENIPUNCTURE: CPT | Performed by: INTERNAL MEDICINE

## 2024-03-30 PROCEDURE — 25000003 PHARM REV CODE 250: Performed by: NURSE PRACTITIONER

## 2024-03-30 PROCEDURE — 12000002 HC ACUTE/MED SURGE SEMI-PRIVATE ROOM

## 2024-03-30 PROCEDURE — 25000003 PHARM REV CODE 250: Performed by: INTERNAL MEDICINE

## 2024-03-30 PROCEDURE — 80053 COMPREHEN METABOLIC PANEL: CPT | Performed by: INTERNAL MEDICINE

## 2024-03-30 PROCEDURE — 63600175 PHARM REV CODE 636 W HCPCS: Performed by: NURSE PRACTITIONER

## 2024-03-30 RX ADMIN — POTASSIUM BICARBONATE 50 MEQ: 977.5 TABLET, EFFERVESCENT ORAL at 08:03

## 2024-03-30 RX ADMIN — NYSTATIN 500000 UNITS: 100000 SUSPENSION ORAL at 04:03

## 2024-03-30 RX ADMIN — FERROUS SULFATE TAB 325 MG (65 MG ELEMENTAL FE) 1 EACH: 325 (65 FE) TAB at 08:03

## 2024-03-30 RX ADMIN — IPRATROPIUM BROMIDE AND ALBUTEROL SULFATE 3 ML: 2.5; .5 SOLUTION RESPIRATORY (INHALATION) at 07:03

## 2024-03-30 RX ADMIN — ENOXAPARIN SODIUM 40 MG: 40 INJECTION SUBCUTANEOUS at 08:03

## 2024-03-30 RX ADMIN — IPRATROPIUM BROMIDE AND ALBUTEROL SULFATE 3 ML: 2.5; .5 SOLUTION RESPIRATORY (INHALATION) at 01:03

## 2024-03-30 RX ADMIN — NYSTATIN 500000 UNITS: 100000 SUSPENSION ORAL at 08:03

## 2024-03-30 RX ADMIN — PREGABALIN 200 MG: 75 CAPSULE ORAL at 08:03

## 2024-03-30 RX ADMIN — HYDROCODONE BITARTRATE AND ACETAMINOPHEN 1 TABLET: 5; 325 TABLET ORAL at 11:03

## 2024-03-30 RX ADMIN — SUCRALFATE 1 G: 1 TABLET ORAL at 04:03

## 2024-03-30 RX ADMIN — PREDNISONE 20 MG: 20 TABLET ORAL at 08:03

## 2024-03-30 RX ADMIN — SUCRALFATE 1 G: 1 TABLET ORAL at 08:03

## 2024-03-30 RX ADMIN — HYDROCODONE BITARTRATE AND ACETAMINOPHEN 1 TABLET: 5; 325 TABLET ORAL at 08:03

## 2024-03-30 RX ADMIN — HYDROCODONE BITARTRATE AND ACETAMINOPHEN 1 TABLET: 5; 325 TABLET ORAL at 04:03

## 2024-03-30 RX ADMIN — CEFTRIAXONE SODIUM 1 G: 1 INJECTION, POWDER, FOR SOLUTION INTRAMUSCULAR; INTRAVENOUS at 11:03

## 2024-03-30 RX ADMIN — PANTOPRAZOLE SODIUM 40 MG: 40 TABLET, DELAYED RELEASE ORAL at 08:03

## 2024-03-30 RX ADMIN — SUCRALFATE 1 G: 1 TABLET ORAL at 11:03

## 2024-03-30 RX ADMIN — NYSTATIN 500000 UNITS: 100000 SUSPENSION ORAL at 12:03

## 2024-03-30 RX ADMIN — GUAIFENESIN 600 MG: 600 TABLET, EXTENDED RELEASE ORAL at 08:03

## 2024-03-30 RX ADMIN — PREGABALIN 200 MG: 75 CAPSULE ORAL at 03:03

## 2024-03-30 RX ADMIN — SUCRALFATE 1 G: 1 TABLET ORAL at 05:03

## 2024-03-30 RX ADMIN — AZITHROMYCIN DIHYDRATE 500 MG: 500 INJECTION, POWDER, LYOPHILIZED, FOR SOLUTION INTRAVENOUS at 12:03

## 2024-03-30 NOTE — RESPIRATORY THERAPY
03/29/24 1937   Patient Assessment/Suction   Level of Consciousness (AVPU) alert   Respiratory Effort Normal;Unlabored   Expansion/Accessory Muscles/Retractions no retractions;no use of accessory muscles   All Lung Fields Breath Sounds diminished   Rhythm/Pattern, Respiratory pattern regular;unlabored   Cough Frequency infrequent   Cough Type good;nonproductive   Skin Integrity   $ Wound Care Tech Time 15 min   Area Observed Left;Right;Behind ear;Cheek;Nares   Skin Appearance without discoloration   PRE-TX-O2   Device (Oxygen Therapy) nasal cannula   Flow (L/min) 2   SpO2 (!) 91 %   Pulse Oximetry Type Intermittent   $ Pulse Oximetry - Multiple Charge Pulse Oximetry - Multiple   Pulse 84   Resp 16   Aerosol Therapy   $ Aerosol Therapy Charges Aerosol Treatment   Daily Review of Necessity (SVN) completed   Respiratory Treatment Status (SVN) given   Treatment Route (SVN) mask;oxygen   Patient Position (SVN) HOB elevated   Post Treatment Assessment (SVN) breath sounds unchanged   Signs of Intolerance (SVN) none   Breath Sounds Post-Respiratory Treatment   Throughout All Fields Post-Treatment All Fields   Throughout All Fields Post-Treatment no change   Post-treatment Heart Rate (beats/min) 85   Post-treatment Resp Rate (breaths/min) 16   Incentive Spirometer   $ Incentive Spirometer Charges done independently per patient   Incentive Spirometer Predicted Level (mL) 1440   Administration (IS) proper technique demonstrated   Number of Repetitions (IS) 10   Level Incentive Spirometer (mL) 1250   Patient Tolerance (IS) good   Education   $ Education Bronchodilator;Other (see comment);15 min  (IS)

## 2024-03-30 NOTE — PLAN OF CARE
Plan of care reviewed with the pt at the beginning of the shift. Pt has remained free from injury and falls. Pt up to bedside commode with assistance Pt reports have generalized fatigue but does not require assistance with ambulation. Fall precautions maintained. Bed locked in lowest position, side rails up x2, non skid footwear on, personal belongings and call light within reach. Instructed pt to call for assistance as needed. Pt has remained afebrile at this time.  Telemetry monitored. Pain medication given for pain to left wrist. Oxygen titrated to 1 liter.

## 2024-03-30 NOTE — PLAN OF CARE
Problem: Infection  Goal: Absence of Infection Signs and Symptoms  Outcome: Ongoing, Progressing     Problem: Adult Inpatient Plan of Care  Goal: Plan of Care Review  Outcome: Ongoing, Progressing  Goal: Patient-Specific Goal (Individualized)  Outcome: Ongoing, Progressing  Goal: Absence of Hospital-Acquired Illness or Injury  Outcome: Ongoing, Progressing  Goal: Optimal Comfort and Wellbeing  Outcome: Ongoing, Progressing  Goal: Readiness for Transition of Care  Outcome: Ongoing, Progressing     Problem: Bariatric Environmental Safety  Goal: Safety Maintained with Care  Outcome: Ongoing, Progressing     Problem: Fall Injury Risk  Goal: Absence of Fall and Fall-Related Injury  Outcome: Ongoing, Progressing     Problem: Fluid Imbalance (Pneumonia)  Goal: Fluid Balance  Outcome: Ongoing, Progressing     Problem: Infection (Pneumonia)  Goal: Resolution of Infection Signs and Symptoms  Outcome: Ongoing, Progressing     Problem: Respiratory Compromise (Pneumonia)  Goal: Effective Oxygenation and Ventilation  Outcome: Ongoing, Progressing     Problem: Impaired Wound Healing  Goal: Optimal Wound Healing  Outcome: Ongoing, Progressing     Problem: Skin Injury Risk Increased  Goal: Skin Health and Integrity  Outcome: Ongoing, Progressing

## 2024-03-30 NOTE — PLAN OF CARE
03/30/24 0743   Patient Assessment/Suction   Level of Consciousness (AVPU) alert   Respiratory Effort Normal;Unlabored   Expansion/Accessory Muscles/Retractions no use of accessory muscles   All Lung Fields Breath Sounds Anterior:;Lateral:;diminished   Rhythm/Pattern, Respiratory pattern regular   Cough Frequency infrequent   Cough Type nonproductive   PRE-TX-O2   Device (Oxygen Therapy) nasal cannula   $ Is the patient on Low Flow Oxygen? Yes   Flow (L/min) 2   Oxygen Concentration (%) 28   SpO2 95 %   Pulse Oximetry Type Intermittent   $ Pulse Oximetry - Single Charge Pulse Oximetry - Single   Pulse 75   Resp 19   Positioning   Body Position position changed independently   Aerosol Therapy   $ Aerosol Therapy Charges Aerosol Treatment   Daily Review of Necessity (SVN) completed   Respiratory Treatment Status (SVN) given   Treatment Route (SVN) mask;oxygen   Patient Position (SVN) HOB elevated   Post Treatment Assessment (SVN) breath sounds improved   Signs of Intolerance (SVN) none   Breath Sounds Post-Respiratory Treatment   Throughout All Fields Post-Treatment All Fields   Throughout All Fields Post-Treatment aeration increased   Post-treatment Heart Rate (beats/min) 77   Post-treatment Resp Rate (breaths/min) 19   Incentive Spirometer   $ Incentive Spirometer Charges done with encouragement   Administration (IS) proper technique demonstrated   Number of Repetitions (IS) 10   Level Incentive Spirometer (mL) 1000   Patient Tolerance (IS) good   General Safety Checklist   Safety Promotion/Fall Prevention side rails raised   Respiratory Interventions   Cough And Deep Breathing done with encouragement   Ready to Wean/Extubation Screen   FIO2<=50 (chart decimal) 0.28   Education   $ Education Bronchodilator;15 min

## 2024-03-30 NOTE — ASSESSMENT & PLAN NOTE
Chronic, controlled.  Latest blood pressure and vitals reviewed-     Temp:  [97.2 °F (36.2 °C)-98.6 °F (37 °C)]   Pulse:  [70-91]   Resp:  [16-19]   BP: ()/(39-65)   SpO2:  [91 %-99 %] .   Home meds for hypertension were reviewed and noted below-  Hypertension Medications               quinapriL (ACCUPRIL) 40 MG tablet Take 40 mg by mouth once daily.            While in the hospital, will manage blood pressure as follows; Continue home antihypertensive regimen    Will utilize p.r.n. blood pressure medication only if patient's blood pressure greater than 180/110 and she develops symptoms such as worsening chest pain or shortness of breath.

## 2024-03-30 NOTE — SUBJECTIVE & OBJECTIVE
Interval History: resp infection panel negative. Intermittent asymptomatic hypotension noted last night @ 2305 BP was 79/54 . Held lisinopril. Still requiring supplemental O2 @ 2L NC. Cough is nonproductive and she is unable to give a sputum specimen for culture.    Review of Systems   Constitutional:  Positive for fatigue.   Respiratory:  Positive for cough.      Objective:     Vital Signs (Most Recent):  Temp: 97.2 °F (36.2 °C) (03/30/24 1143)  Pulse: 79 (03/30/24 1143)  Resp: 18 (03/30/24 1143)  BP: 99/62 (03/30/24 1147)  SpO2: 97 % (03/30/24 1143) Vital Signs (24h Range):  Temp:  [97.2 °F (36.2 °C)-98.6 °F (37 °C)] 97.2 °F (36.2 °C)  Pulse:  [70-91] 79  Resp:  [16-19] 18  SpO2:  [91 %-99 %] 97 %  BP: ()/(39-65) 99/62     Weight: 113.4 kg (250 lb)  Body mass index is 44.29 kg/m².    Intake/Output Summary (Last 24 hours) at 3/30/2024 1229  Last data filed at 3/30/2024 1142  Gross per 24 hour   Intake 540 ml   Output --   Net 540 ml         Physical Exam  Vitals and nursing note reviewed.   Constitutional:       Appearance: Normal appearance. She is obese.   HENT:      Head: Normocephalic and atraumatic.      Nose: Nose normal.      Mouth/Throat:      Mouth: Mucous membranes are moist.      Pharynx: Oropharynx is clear.   Eyes:      Extraocular Movements: Extraocular movements intact.      Pupils: Pupils are equal, round, and reactive to light.   Cardiovascular:      Rate and Rhythm: Normal rate and regular rhythm.      Pulses: Normal pulses.      Heart sounds: Normal heart sounds.   Pulmonary:      Effort: Pulmonary effort is normal.      Comments: Coarse in bilat bases  Abdominal:      General: Bowel sounds are normal.      Palpations: Abdomen is soft.   Musculoskeletal:         General: Normal range of motion.      Cervical back: Normal range of motion and neck supple.   Skin:     General: Skin is warm and dry.      Capillary Refill: Capillary refill takes less than 2 seconds.   Neurological:      General:  No focal deficit present.      Mental Status: She is alert and oriented to person, place, and time.   Psychiatric:         Mood and Affect: Mood normal.         Behavior: Behavior normal.             Significant Labs: All pertinent labs within the past 24 hours have been reviewed.  CBC:   Recent Labs   Lab 03/29/24  0545 03/30/24  0544   WBC 5.33 5.54   HGB 11.2* 11.0*   HCT 34.3* 34.9*    199     CMP:   Recent Labs   Lab 03/29/24  0545 03/30/24  0544   * 140   K 3.3* 3.7   CL 96 102   CO2 33* 33*   * 99   BUN 6* 9   CREATININE 0.6 0.5   CALCIUM 8.7 9.0   PROT 6.7 6.5   ALBUMIN 3.1* 3.0*   BILITOT 0.4 0.3   ALKPHOS 95 84   AST 27 28   ALT 9* 10   ANIONGAP 6* 5*       Significant Imaging: I have reviewed all pertinent imaging results/findings within the past 24 hours.    X-Ray Chest 1 View    Result Date: 3/29/2024  Portable chest x-ray at 8:35 AM is compared to prior study dated 3/28/2024 Clinical history is cough, pneumonia FINDINGS: There is hypoinflation. The heart is enlarged. There are stable bilateral interstitial and groundglass pulmonary infiltrates. There are no new infiltrates or pleural effusions. There are no acute osseous abnormalities. IMPRESSION: Stable bilateral interstitial and groundglass opacities suggestive of pneumonia Electronically signed by:  Shannon Ly MD  03/29/2024 09:16 AM CDT Workstation: [id number]    X-Ray Chest AP Portable    Result Date: 3/28/2024  Chest single view CLINICAL DATA: Cough FINDINGS: AP view is compared to March 25. Heart size is normal. The mediastinum is unremarkable. Asymmetric right hilar prominence is unchanged. Multifocal bilateral pulmonary infiltrates are again noted. There is slight increase at the right lung apex, with no other changes identified when allowing for slight differences in technique. There are no pleural effusions. No acute osseous abnormalities are identified. IMPRESSION: 1. Multifocal bilateral pulmonary infiltrates,  slightly increased at the right lung apex compared to March 25. No other significant changes. Electronically signed by:  Vicenet Jesus MD  03/28/2024 08:08 AM CDT Workstation: [id number]    US Lower Extremity Veins Bilateral    Result Date: 3/27/2024  US LOWER EXTREMITY VEINS BILATERAL CLINICAL HISTORY: 69 years Female swelling/immobility FINDINGS: Grayscale, color and spectral Doppler analysis of the bilateral lower extremity deep venous system was performed. There is normal compressibility, with normal flow by color and spectral Doppler analysis in the bilateral lower extremity deep venous system, with normal augmentation and no evidence of deep venous thrombosis. IMPRESSION: Negative for bilateral lower extremity DVT. Electronically signed by:  Kolton Mayfield MD  03/27/2024 04:43 PM CDT Workstation: [id number]    CT Chest With Contrast    Result Date: 3/25/2024  EXAM DESCRIPTION: CT CHEST WITH CONTRAST CLINICAL HISTORY: 69 years  Female;  resp failure; Shortness of Breath (SOB since this morning. ); Weakness (Right sided weakness starting yesterday.) TECHNIQUE: CT of the chest using intravenous contrast. All CT scans at this facility use dose modulation, iterative reconstruction, and/or weight based dosing when appropriate to reduce radiation dose to as low as reasonably achievable. COMPARISON: None. FINDINGS: Chest: Lungs: Patchy airspace disease throughout the bilateral lungs, predominantly involving the bilateral lower lobes and lingula with consistent with multifocal pneumonia. Scattered bilateral nodular densities measuring up to 9 mm likely infectious/inflammatory and related to pneumonia.. Mediastinum:Mildly prominent mediastinal and bilateral hilar lymph nodes likely reactive.. Mediastinal vascular structures are unremarkable.  Heart is normal in size. Bones:No acute bone findings. Small hiatal hernia. Prior gastric bypass and cholecystectomy. No acute findings in the visualized upper abdomen.  IMPRESSION: 1.  Findings as above suggest multifocal pneumonia. 2. Scattered bilateral nodular densities are likely infectious/inflammatory and related to multifocal pneumonia. 3 month follow-up CT is recommended to document resolution 3. Probable reactive mediastinal and hilar lymph nodes. Attention on follow-up is recommended Electronically signed by:  Italo Dent MD  03/25/2024 11:41 PM CDT Workstation: [id number]    X-Ray Chest AP Portable    Result Date: 3/25/2024  XR CHEST AP PORTABLE RPID: XR CHEST 1 VIEW  3/25/2024 9:31 PM CDT CLINICAL HISTORY: 69 years old Female with Sepsis COMPARISON: Chest 6/3/2023 FINDINGS: The inspiration is shallow. There are new patchy bilateral pulmonary parenchymal opacities these are somewhat nodular in the right perihilar region and left upper lobe. No pleural fluid. Cardiac mediastinal silhouette is accentuated by shallow inspiration. IMPRESSION: There are bilateral nodular patchy parenchymal opacities which are new consider correlation with contrast-enhanced chest CT examination. Electronically signed by:  Chidi Harris MD  03/25/2024 09:33 PM CDT Workstation: [id number]

## 2024-03-30 NOTE — PROGRESS NOTES
[facility]  Progress Note    Patient Name: Megha Robb  MRN: [id number]  Patient Class: IP- Inpatient   Admission Date: 3/25/2024  Length of Stay: 5 days  Attending Physician: Clarissa Marie MD  Primary Care Provider: Cyrus Patel MD (Inactive)        Subjective:     Principal Problem:Acute hypoxic respiratory failure        HPI:  This is a 68 y/o  female w/ pmh of left forearm fx (s/p recent surgery), HTN who p/w CC of SOB and cough. Patient reports 1-day h/o SOB associated w/ a dry cough; denies being on home O2. Patient denies headache, blurry vision, chest pain, abdominal pain, N/V and dysuria. Patient's tmax is 100; hemodynamically stable; requiring 4 L of O2 via NC. Procalcitonin noted to be elevated at 3.592. Patient tested negative for Influenza and Covid. CXR manifested bilateral nodular patchy parenchymal opacities which are new consider correlation with contrast-enhanced chest CT examination.     Overview/Hospital Course:  69 y.o. F with recent history of radial head fracture, presents with worsening shortness of breath. CT chest revealed findings suggestive of multifocal pneumonia, scattered bilateral nodular densities likely infectious/inflammatory process - recommend 3 month follow up CT after treatment. Patient noted to have allergy to PCN and vanc - therefore was started on Linezolid and cefepime. Patient required supplemental oxygen.  Blood cultures obtained, NGTD.  Sputum culture ordered and pending.  Consult to Ortho, patient is to follow up as an outpatient per Dr Fierro.  Respiratory status slowly improving, supplemental oxygen has been titrated down to 2 L.  Will obtain MRSA nasal swab.  Prednisone added.  IS added.     Interval History: resp infection panel negative. Intermittent asymptomatic hypotension noted last night @ 2305 BP was 79/54 . Held lisinopril. Still requiring supplemental O2 @ 2L NC. Cough is nonproductive and she is  unable to give a sputum specimen for culture.    Review of Systems   Constitutional:  Positive for fatigue.   Respiratory:  Positive for cough.      Objective:     Vital Signs (Most Recent):  Temp: 97.2 °F (36.2 °C) (03/30/24 1143)  Pulse: 79 (03/30/24 1143)  Resp: 18 (03/30/24 1143)  BP: 99/62 (03/30/24 1147)  SpO2: 97 % (03/30/24 1143) Vital Signs (24h Range):  Temp:  [97.2 °F (36.2 °C)-98.6 °F (37 °C)] 97.2 °F (36.2 °C)  Pulse:  [70-91] 79  Resp:  [16-19] 18  SpO2:  [91 %-99 %] 97 %  BP: ()/(39-65) 99/62     Weight: 113.4 kg (250 lb)  Body mass index is 44.29 kg/m².    Intake/Output Summary (Last 24 hours) at 3/30/2024 1229  Last data filed at 3/30/2024 1142  Gross per 24 hour   Intake 540 ml   Output --   Net 540 ml         Physical Exam  Vitals and nursing note reviewed.   Constitutional:       Appearance: Normal appearance. She is obese.   HENT:      Head: Normocephalic and atraumatic.      Nose: Nose normal.      Mouth/Throat:      Mouth: Mucous membranes are moist.      Pharynx: Oropharynx is clear.   Eyes:      Extraocular Movements: Extraocular movements intact.      Pupils: Pupils are equal, round, and reactive to light.   Cardiovascular:      Rate and Rhythm: Normal rate and regular rhythm.      Pulses: Normal pulses.      Heart sounds: Normal heart sounds.   Pulmonary:      Effort: Pulmonary effort is normal.      Comments: Coarse in bilat bases  Abdominal:      General: Bowel sounds are normal.      Palpations: Abdomen is soft.   Musculoskeletal:         General: Normal range of motion.      Cervical back: Normal range of motion and neck supple.   Skin:     General: Skin is warm and dry.      Capillary Refill: Capillary refill takes less than 2 seconds.   Neurological:      General: No focal deficit present.      Mental Status: She is alert and oriented to person, place, and time.   Psychiatric:         Mood and Affect: Mood normal.         Behavior: Behavior normal.             Significant Labs:  All pertinent labs within the past 24 hours have been reviewed.  CBC:   Recent Labs   Lab 03/29/24  0545 03/30/24  0544   WBC 5.33 5.54   HGB 11.2* 11.0*   HCT 34.3* 34.9*    199     CMP:   Recent Labs   Lab 03/29/24  0545 03/30/24  0544   * 140   K 3.3* 3.7   CL 96 102   CO2 33* 33*   * 99   BUN 6* 9   CREATININE 0.6 0.5   CALCIUM 8.7 9.0   PROT 6.7 6.5   ALBUMIN 3.1* 3.0*   BILITOT 0.4 0.3   ALKPHOS 95 84   AST 27 28   ALT 9* 10   ANIONGAP 6* 5*       Significant Imaging: I have reviewed all pertinent imaging results/findings within the past 24 hours.    X-Ray Chest 1 View    Result Date: 3/29/2024  Portable chest x-ray at 8:35 AM is compared to prior study dated 3/28/2024 Clinical history is cough, pneumonia FINDINGS: There is hypoinflation. The heart is enlarged. There are stable bilateral interstitial and groundglass pulmonary infiltrates. There are no new infiltrates or pleural effusions. There are no acute osseous abnormalities. IMPRESSION: Stable bilateral interstitial and groundglass opacities suggestive of pneumonia Electronically signed by:  Shannon Ly MD  03/29/2024 09:16 AM CDT Workstation: [id number]    X-Ray Chest AP Portable    Result Date: 3/28/2024  Chest single view CLINICAL DATA: Cough FINDINGS: AP view is compared to March 25. Heart size is normal. The mediastinum is unremarkable. Asymmetric right hilar prominence is unchanged. Multifocal bilateral pulmonary infiltrates are again noted. There is slight increase at the right lung apex, with no other changes identified when allowing for slight differences in technique. There are no pleural effusions. No acute osseous abnormalities are identified. IMPRESSION: 1. Multifocal bilateral pulmonary infiltrates, slightly increased at the right lung apex compared to March 25. No other significant changes. Electronically signed by:  Vicente Jesus MD  03/28/2024 08:08 AM CDT Workstation: [id number]    US Lower Extremity Veins  Bilateral    Result Date: 3/27/2024  US LOWER EXTREMITY VEINS BILATERAL CLINICAL HISTORY: 69 years Female swelling/immobility FINDINGS: Grayscale, color and spectral Doppler analysis of the bilateral lower extremity deep venous system was performed. There is normal compressibility, with normal flow by color and spectral Doppler analysis in the bilateral lower extremity deep venous system, with normal augmentation and no evidence of deep venous thrombosis. IMPRESSION: Negative for bilateral lower extremity DVT. Electronically signed by:  Kolton Mayfield MD  03/27/2024 04:43 PM CDT Workstation: [id number]    CT Chest With Contrast    Result Date: 3/25/2024  EXAM DESCRIPTION: CT CHEST WITH CONTRAST CLINICAL HISTORY: 69 years  Female;  resp failure; Shortness of Breath (SOB since this morning. ); Weakness (Right sided weakness starting yesterday.) TECHNIQUE: CT of the chest using intravenous contrast. All CT scans at this facility use dose modulation, iterative reconstruction, and/or weight based dosing when appropriate to reduce radiation dose to as low as reasonably achievable. COMPARISON: None. FINDINGS: Chest: Lungs: Patchy airspace disease throughout the bilateral lungs, predominantly involving the bilateral lower lobes and lingula with consistent with multifocal pneumonia. Scattered bilateral nodular densities measuring up to 9 mm likely infectious/inflammatory and related to pneumonia.. Mediastinum:Mildly prominent mediastinal and bilateral hilar lymph nodes likely reactive.. Mediastinal vascular structures are unremarkable.  Heart is normal in size. Bones:No acute bone findings. Small hiatal hernia. Prior gastric bypass and cholecystectomy. No acute findings in the visualized upper abdomen. IMPRESSION: 1.  Findings as above suggest multifocal pneumonia. 2. Scattered bilateral nodular densities are likely infectious/inflammatory and related to multifocal pneumonia. 3 month follow-up CT is recommended to document  resolution 3. Probable reactive mediastinal and hilar lymph nodes. Attention on follow-up is recommended Electronically signed by:  Italo Dent MD  03/25/2024 11:41 PM CDT Workstation: [id number]    X-Ray Chest AP Portable    Result Date: 3/25/2024  XR CHEST AP PORTABLE RPID: XR CHEST 1 VIEW  3/25/2024 9:31 PM CDT CLINICAL HISTORY: 69 years old Female with Sepsis COMPARISON: Chest 6/3/2023 FINDINGS: The inspiration is shallow. There are new patchy bilateral pulmonary parenchymal opacities these are somewhat nodular in the right perihilar region and left upper lobe. No pleural fluid. Cardiac mediastinal silhouette is accentuated by shallow inspiration. IMPRESSION: There are bilateral nodular patchy parenchymal opacities which are new consider correlation with contrast-enhanced chest CT examination. Electronically signed by:  Chidi Harris MD  03/25/2024 09:33 PM CDT Workstation: [id number]       Assessment/Plan:      * Acute hypoxic respiratory failure secondary to Pneumonia  CT chest with multifocal PNA  Continue IV antibiotics   Blood cx x2- NGTD  Duonebs prn  Supplemental O2 as necessary and wean as tolerated  Antipyretic prn  Antitussive prn  Telemetry monitoring  Consider Pulmonology consult if no improvement  Monitor labs  Vital sign checks to help ensure hemodynamic stability    Bilateral lower extremity edema  Noted bilateral LE edema.  Does not seem to have pain.  US BLE negative for DVT   Recent CT chest with contrast showed multifocal pneumonia  Dose of IV lasix given      Multifocal pneumonia  IV antibiotics   Mucinex   Duonebs scheduled/PRN  Blood cultures NGTD  Sputum culture ordered and pending   Supplemental oxygen as needed  Repeat procalcitonin - downtrending  IS at bedside   Add short course of prednisone        Status post surgery (recent left forearm surgery)  Analgesic prn  lost to follow  will reach out to ortho - patient may have been lost to follow - has left arm splint since Jan  Per   "Vadim, pt OK to f/u OP  Pt states surgery per Dr. Medley    Physical deconditioning  PT/OT  Maintain fall precautions      HTN (hypertension)  Chronic, controlled.  Latest blood pressure and vitals reviewed-     Temp:  [97.2 °F (36.2 °C)-98.6 °F (37 °C)]   Pulse:  [70-91]   Resp:  [16-19]   BP: ()/(39-65)   SpO2:  [91 %-99 %] .   Home meds for hypertension were reviewed and noted below-  Hypertension Medications               quinapriL (ACCUPRIL) 40 MG tablet Take 40 mg by mouth once daily.            While in the hospital, will manage blood pressure as follows; Continue home antihypertensive regimen    Will utilize p.r.n. blood pressure medication only if patient's blood pressure greater than 180/110 and she develops symptoms such as worsening chest pain or shortness of breath.       VTE Risk Mitigation (From admission, onward)           Ordered     Place DAVON hose  Until discontinued         03/27/24 1510     enoxaparin injection 40 mg  Every 12 hours        Note to Pharmacy: Ht: 5' 3" (1.6 m)  Wt: 113.4 kg (250 lb)  Estimated Creatinine Clearance: 80.5 mL/min (based on SCr of 0.8 mg/dL).  Body mass index is 44.29 kg/m².    03/25/24 2252     IP VTE HIGH RISK PATIENT  Once         03/25/24 2244     Place sequential compression device  Until discontinued         03/25/24 2244                    Discharge Planning   JORGE L:      Code Status: Full Code   Is the patient medically ready for discharge?:     Reason for patient still in hospital (select all that apply): Patient trending condition  Discharge Plan A: Home with family                  Naomi Sharma NP  Department of Hospital Medicine   [facility]    "

## 2024-03-31 LAB
ALBUMIN SERPL BCP-MCNC: 3 G/DL (ref 3.5–5.2)
ALP SERPL-CCNC: 95 U/L (ref 55–135)
ALT SERPL W/O P-5'-P-CCNC: 18 U/L (ref 10–44)
ANION GAP SERPL CALC-SCNC: 6 MMOL/L (ref 8–16)
AST SERPL-CCNC: 36 U/L (ref 10–40)
BASOPHILS # BLD AUTO: 0.03 K/UL (ref 0–0.2)
BASOPHILS NFR BLD: 0.5 % (ref 0–1.9)
BILIRUB SERPL-MCNC: 0.3 MG/DL (ref 0.1–1)
BUN SERPL-MCNC: 9 MG/DL (ref 8–23)
CALCIUM SERPL-MCNC: 9 MG/DL (ref 8.7–10.5)
CHLORIDE SERPL-SCNC: 100 MMOL/L (ref 95–110)
CO2 SERPL-SCNC: 32 MMOL/L (ref 23–29)
CREAT SERPL-MCNC: 0.5 MG/DL (ref 0.5–1.4)
DIFFERENTIAL METHOD BLD: ABNORMAL
EOSINOPHIL # BLD AUTO: 0.3 K/UL (ref 0–0.5)
EOSINOPHIL NFR BLD: 5.6 % (ref 0–8)
ERYTHROCYTE [DISTWIDTH] IN BLOOD BY AUTOMATED COUNT: 14 % (ref 11.5–14.5)
EST. GFR  (NO RACE VARIABLE): >60 ML/MIN/1.73 M^2
GLUCOSE SERPL-MCNC: 129 MG/DL (ref 70–110)
GLUCOSE SERPL-MCNC: 215 MG/DL (ref 70–110)
GLUCOSE SERPL-MCNC: 99 MG/DL (ref 70–110)
HCT VFR BLD AUTO: 36.3 % (ref 37–48.5)
HGB BLD-MCNC: 11.4 G/DL (ref 12–16)
IMM GRANULOCYTES # BLD AUTO: 0.09 K/UL (ref 0–0.04)
IMM GRANULOCYTES NFR BLD AUTO: 1.6 % (ref 0–0.5)
LYMPHOCYTES # BLD AUTO: 1.7 K/UL (ref 1–4.8)
LYMPHOCYTES NFR BLD: 30.8 % (ref 18–48)
MAGNESIUM SERPL-MCNC: 2.1 MG/DL (ref 1.6–2.6)
MCH RBC QN AUTO: 30.7 PG (ref 27–31)
MCHC RBC AUTO-ENTMCNC: 31.4 G/DL (ref 32–36)
MCV RBC AUTO: 98 FL (ref 82–98)
MONOCYTES # BLD AUTO: 0.5 K/UL (ref 0.3–1)
MONOCYTES NFR BLD: 8.9 % (ref 4–15)
NEUTROPHILS # BLD AUTO: 2.9 K/UL (ref 1.8–7.7)
NEUTROPHILS NFR BLD: 52.6 % (ref 38–73)
NRBC BLD-RTO: 0 /100 WBC
PLATELET # BLD AUTO: 223 K/UL (ref 150–450)
PMV BLD AUTO: 9 FL (ref 9.2–12.9)
POTASSIUM SERPL-SCNC: 3.5 MMOL/L (ref 3.5–5.1)
PROT SERPL-MCNC: 6.5 G/DL (ref 6–8.4)
RBC # BLD AUTO: 3.71 M/UL (ref 4–5.4)
SODIUM SERPL-SCNC: 138 MMOL/L (ref 136–145)
WBC # BLD AUTO: 5.49 K/UL (ref 3.9–12.7)

## 2024-03-31 PROCEDURE — 94761 N-INVAS EAR/PLS OXIMETRY MLT: CPT

## 2024-03-31 PROCEDURE — 63600175 PHARM REV CODE 636 W HCPCS: Performed by: NURSE PRACTITIONER

## 2024-03-31 PROCEDURE — 80053 COMPREHEN METABOLIC PANEL: CPT | Performed by: INTERNAL MEDICINE

## 2024-03-31 PROCEDURE — 36415 COLL VENOUS BLD VENIPUNCTURE: CPT | Performed by: INTERNAL MEDICINE

## 2024-03-31 PROCEDURE — 85025 COMPLETE CBC W/AUTO DIFF WBC: CPT | Performed by: INTERNAL MEDICINE

## 2024-03-31 PROCEDURE — 83735 ASSAY OF MAGNESIUM: CPT | Performed by: INTERNAL MEDICINE

## 2024-03-31 PROCEDURE — 99900031 HC PATIENT EDUCATION (STAT)

## 2024-03-31 PROCEDURE — 94760 N-INVAS EAR/PLS OXIMETRY 1: CPT

## 2024-03-31 PROCEDURE — 25000242 PHARM REV CODE 250 ALT 637 W/ HCPCS: Performed by: NURSE PRACTITIONER

## 2024-03-31 PROCEDURE — 99900035 HC TECH TIME PER 15 MIN (STAT)

## 2024-03-31 PROCEDURE — 25000003 PHARM REV CODE 250: Performed by: NURSE PRACTITIONER

## 2024-03-31 PROCEDURE — 12000002 HC ACUTE/MED SURGE SEMI-PRIVATE ROOM

## 2024-03-31 PROCEDURE — 27000221 HC OXYGEN, UP TO 24 HOURS

## 2024-03-31 PROCEDURE — 94640 AIRWAY INHALATION TREATMENT: CPT

## 2024-03-31 PROCEDURE — 25000003 PHARM REV CODE 250: Performed by: INTERNAL MEDICINE

## 2024-03-31 PROCEDURE — 94799 UNLISTED PULMONARY SVC/PX: CPT | Mod: XB

## 2024-03-31 PROCEDURE — 63600175 PHARM REV CODE 636 W HCPCS: Performed by: INTERNAL MEDICINE

## 2024-03-31 RX ADMIN — ENOXAPARIN SODIUM 40 MG: 40 INJECTION SUBCUTANEOUS at 08:03

## 2024-03-31 RX ADMIN — SUCRALFATE 1 G: 1 TABLET ORAL at 08:03

## 2024-03-31 RX ADMIN — GUAIFENESIN 600 MG: 600 TABLET, EXTENDED RELEASE ORAL at 08:03

## 2024-03-31 RX ADMIN — PREGABALIN 200 MG: 75 CAPSULE ORAL at 03:03

## 2024-03-31 RX ADMIN — FERROUS SULFATE TAB 325 MG (65 MG ELEMENTAL FE) 1 EACH: 325 (65 FE) TAB at 08:03

## 2024-03-31 RX ADMIN — CEFTRIAXONE SODIUM 1 G: 1 INJECTION, POWDER, FOR SOLUTION INTRAMUSCULAR; INTRAVENOUS at 11:03

## 2024-03-31 RX ADMIN — PREGABALIN 200 MG: 75 CAPSULE ORAL at 08:03

## 2024-03-31 RX ADMIN — IPRATROPIUM BROMIDE AND ALBUTEROL SULFATE 3 ML: 2.5; .5 SOLUTION RESPIRATORY (INHALATION) at 08:03

## 2024-03-31 RX ADMIN — IPRATROPIUM BROMIDE AND ALBUTEROL SULFATE 3 ML: 2.5; .5 SOLUTION RESPIRATORY (INHALATION) at 12:03

## 2024-03-31 RX ADMIN — HYDROCODONE BITARTRATE AND ACETAMINOPHEN 1 TABLET: 5; 325 TABLET ORAL at 07:03

## 2024-03-31 RX ADMIN — IPRATROPIUM BROMIDE AND ALBUTEROL SULFATE 3 ML: 2.5; .5 SOLUTION RESPIRATORY (INHALATION) at 07:03

## 2024-03-31 RX ADMIN — PANTOPRAZOLE SODIUM 40 MG: 40 TABLET, DELAYED RELEASE ORAL at 08:03

## 2024-03-31 RX ADMIN — NYSTATIN 500000 UNITS: 100000 SUSPENSION ORAL at 08:03

## 2024-03-31 RX ADMIN — HYDROCODONE BITARTRATE AND ACETAMINOPHEN 1 TABLET: 5; 325 TABLET ORAL at 08:03

## 2024-03-31 RX ADMIN — AZITHROMYCIN DIHYDRATE 500 MG: 500 INJECTION, POWDER, LYOPHILIZED, FOR SOLUTION INTRAVENOUS at 11:03

## 2024-03-31 RX ADMIN — IPRATROPIUM BROMIDE AND ALBUTEROL SULFATE 3 ML: 2.5; .5 SOLUTION RESPIRATORY (INHALATION) at 01:03

## 2024-03-31 RX ADMIN — SUCRALFATE 1 G: 1 TABLET ORAL at 11:03

## 2024-03-31 RX ADMIN — PREDNISONE 20 MG: 20 TABLET ORAL at 08:03

## 2024-03-31 RX ADMIN — NYSTATIN 500000 UNITS: 100000 SUSPENSION ORAL at 12:03

## 2024-03-31 RX ADMIN — SUCRALFATE 1 G: 1 TABLET ORAL at 03:03

## 2024-03-31 RX ADMIN — SUCRALFATE 1 G: 1 TABLET ORAL at 05:03

## 2024-03-31 RX ADMIN — NYSTATIN 500000 UNITS: 100000 SUSPENSION ORAL at 04:03

## 2024-03-31 NOTE — SUBJECTIVE & OBJECTIVE
Interval History: Patient seen in bed.  NAD.  Denies shortness of breath today.  Remains on 2 L O2 nasal cannula.  Repeat CXR stable.  Home oxygen evaluation ordered and pending.     Review of Systems   Constitutional:  Positive for fatigue.   HENT:  Negative for congestion.    Respiratory:  Positive for cough. Negative for shortness of breath and wheezing.    Cardiovascular:  Positive for leg swelling (improving). Negative for chest pain.   Gastrointestinal:  Negative for abdominal pain, nausea and vomiting.     Objective:     Vital Signs (Most Recent):  Temp: 97.7 °F (36.5 °C) (03/31/24 1029)  Pulse: 78 (03/31/24 1029)  Resp: 18 (03/31/24 1029)  BP: 113/72 (03/31/24 1029)  SpO2: 98 % (03/31/24 1029) Vital Signs (24h Range):  Temp:  [97.2 °F (36.2 °C)-97.8 °F (36.6 °C)] 97.7 °F (36.5 °C)  Pulse:  [69-96] 78  Resp:  [16-20] 18  SpO2:  [94 %-100 %] 98 %  BP: ()/(39-76) 113/72     Weight: 113.4 kg (250 lb)  Body mass index is 44.29 kg/m².    Intake/Output Summary (Last 24 hours) at 3/31/2024 1140  Last data filed at 3/30/2024 1613  Gross per 24 hour   Intake 300 ml   Output 200 ml   Net 100 ml           Physical Exam  Vitals and nursing note reviewed.   Constitutional:       General: She is not in acute distress.  HENT:      Head: Normocephalic.      Mouth/Throat:      Mouth: Mucous membranes are moist.   Cardiovascular:      Rate and Rhythm: Normal rate and regular rhythm.   Pulmonary:      Effort: Pulmonary effort is normal. No respiratory distress.      Breath sounds: No wheezing.      Comments: Diminished throughout   Abdominal:      Palpations: Abdomen is soft.      Tenderness: There is no abdominal tenderness.   Musculoskeletal:      Right lower leg: Edema present.      Left lower leg: Edema present.      Comments: Ace wrap to left forearm   Skin:     General: Skin is warm and dry.   Neurological:      General: No focal deficit present.      Mental Status: She is alert.   Psychiatric:         Mood and  "Affect: Mood normal.             Significant Labs: All pertinent labs within the past 24 hours have been reviewed.  BMP:   Recent Labs   Lab 03/31/24  0802   GLU 99      K 3.5      CO2 32*   BUN 9   CREATININE 0.5   CALCIUM 9.0   MG 2.1       CBC:   Recent Labs   Lab 03/30/24  0544 03/31/24  0802   WBC 5.54 5.49   HGB 11.0* 11.4*   HCT 34.9* 36.3*    223       CMP:   Recent Labs   Lab 03/30/24  0544 03/31/24  0802    138   K 3.7 3.5    100   CO2 33* 32*   GLU 99 99   BUN 9 9   CREATININE 0.5 0.5   CALCIUM 9.0 9.0   PROT 6.5 6.5   ALBUMIN 3.0* 3.0*   BILITOT 0.3 0.3   ALKPHOS 84 95   AST 28 36   ALT 10 18   ANIONGAP 5* 6*       Magnesium:   Recent Labs   Lab 03/30/24  0544 03/31/24  0802   MG 2.2 2.1       Troponin: No results for input(s): "TROPONINI", "TROPONINIHS" in the last 48 hours.    Significant Imaging: I have reviewed all pertinent imaging results/findings within the past 24 hours.  "

## 2024-03-31 NOTE — PROGRESS NOTES
[facility]  Progress Note    Patient Name: Megha Robb  MRN: [id number]  Patient Class: IP- Inpatient   Admission Date: 3/25/2024  Length of Stay: 6 days  Attending Physician: Clarissa Marie MD  Primary Care Provider: Cyrus Patel MD (Inactive)        Subjective:     Principal Problem:Acute hypoxic respiratory failure        HPI:  This is a 70 y/o  female w/ pmh of left forearm fx (s/p recent surgery), HTN who p/w CC of SOB and cough. Patient reports 1-day h/o SOB associated w/ a dry cough; denies being on home O2. Patient denies headache, blurry vision, chest pain, abdominal pain, N/V and dysuria. Patient's tmax is 100; hemodynamically stable; requiring 4 L of O2 via NC. Procalcitonin noted to be elevated at 3.592. Patient tested negative for Influenza and Covid. CXR manifested bilateral nodular patchy parenchymal opacities which are new consider correlation with contrast-enhanced chest CT examination.     Overview/Hospital Course:  69 y.o. F with recent history of radial head fracture, presents with worsening shortness of breath. CT chest revealed findings suggestive of multifocal pneumonia, scattered bilateral nodular densities likely infectious/inflammatory process - recommend 3 month follow up CT after treatment. Patient noted to have allergy to PCN and vanc - therefore was started on Linezolid and cefepime. Patient required supplemental oxygen.  Blood cultures obtained, NGTD.  Sputum culture ordered and pending.  Consult to Ortho, patient is to follow up as an outpatient per Dr Fierro.  Respiratory status slowly improving, supplemental oxygen has been titrated down to 2 L.  Respiratory infection panel negative.  MRSA nasal swab negative.  Antibiotics changed to rocephin/azithromycin.  Prednisone added for wheezing.  IS added.  Home oxygen evaluation ordered.     Interval History: Patient seen in bed.  NAD.  Denies shortness of breath today.  Remains on  2 L O2 nasal cannula.  Repeat CXR stable.  Home oxygen evaluation ordered and pending.     Review of Systems   Constitutional:  Positive for fatigue.   HENT:  Negative for congestion.    Respiratory:  Positive for cough. Negative for shortness of breath and wheezing.    Cardiovascular:  Positive for leg swelling (improving). Negative for chest pain.   Gastrointestinal:  Negative for abdominal pain, nausea and vomiting.     Objective:     Vital Signs (Most Recent):  Temp: 97.7 °F (36.5 °C) (03/31/24 1029)  Pulse: 78 (03/31/24 1029)  Resp: 18 (03/31/24 1029)  BP: 113/72 (03/31/24 1029)  SpO2: 98 % (03/31/24 1029) Vital Signs (24h Range):  Temp:  [97.2 °F (36.2 °C)-97.8 °F (36.6 °C)] 97.7 °F (36.5 °C)  Pulse:  [69-96] 78  Resp:  [16-20] 18  SpO2:  [94 %-100 %] 98 %  BP: ()/(39-76) 113/72     Weight: 113.4 kg (250 lb)  Body mass index is 44.29 kg/m².    Intake/Output Summary (Last 24 hours) at 3/31/2024 1140  Last data filed at 3/30/2024 1613  Gross per 24 hour   Intake 300 ml   Output 200 ml   Net 100 ml           Physical Exam  Vitals and nursing note reviewed.   Constitutional:       General: She is not in acute distress.  HENT:      Head: Normocephalic.      Mouth/Throat:      Mouth: Mucous membranes are moist.   Cardiovascular:      Rate and Rhythm: Normal rate and regular rhythm.   Pulmonary:      Effort: Pulmonary effort is normal. No respiratory distress.      Breath sounds: No wheezing.      Comments: Diminished throughout   Abdominal:      Palpations: Abdomen is soft.      Tenderness: There is no abdominal tenderness.   Musculoskeletal:      Right lower leg: Edema present.      Left lower leg: Edema present.      Comments: Ace wrap to left forearm   Skin:     General: Skin is warm and dry.   Neurological:      General: No focal deficit present.      Mental Status: She is alert.   Psychiatric:         Mood and Affect: Mood normal.             Significant Labs: All pertinent labs within the past 24 hours  "have been reviewed.  BMP:   Recent Labs   Lab 03/31/24  0802   GLU 99      K 3.5      CO2 32*   BUN 9   CREATININE 0.5   CALCIUM 9.0   MG 2.1       CBC:   Recent Labs   Lab 03/30/24  0544 03/31/24  0802   WBC 5.54 5.49   HGB 11.0* 11.4*   HCT 34.9* 36.3*    223       CMP:   Recent Labs   Lab 03/30/24  0544 03/31/24  0802    138   K 3.7 3.5    100   CO2 33* 32*   GLU 99 99   BUN 9 9   CREATININE 0.5 0.5   CALCIUM 9.0 9.0   PROT 6.5 6.5   ALBUMIN 3.0* 3.0*   BILITOT 0.3 0.3   ALKPHOS 84 95   AST 28 36   ALT 10 18   ANIONGAP 5* 6*       Magnesium:   Recent Labs   Lab 03/30/24  0544 03/31/24  0802   MG 2.2 2.1       Troponin: No results for input(s): "TROPONINI", "TROPONINIHS" in the last 48 hours.    Significant Imaging: I have reviewed all pertinent imaging results/findings within the past 24 hours.    Assessment/Plan:      * Acute hypoxic respiratory failure secondary to Pneumonia  CT chest with multifocal PNA  Continue IV antibiotics   Blood cx x2- NGTD  Duonebs prn  Supplemental O2 as necessary and wean as tolerated  Antipyretic prn  Antitussive prn  Telemetry monitoring  Consider Pulmonology consult if no improvement  Monitor labs  Vital sign checks to help ensure hemodynamic stability    Bilateral lower extremity edema  Noted bilateral LE edema.  Does not seem to have pain.  US BLE negative for DVT   Recent CT chest with contrast showed multifocal pneumonia  Dose of IV lasix given      Multifocal pneumonia  IV antibiotics   Mucinex   Duonebs scheduled/PRN  Blood cultures NGTD  Sputum culture ordered and pending collection - nonproductive cough, unable to collect   Supplemental oxygen as needed  Repeat procalcitonin - downtrending  IS at bedside   Add short course of prednisone  MRSA nasal swab negative   Respiratory infection panel negative         Status post surgery (recent left forearm surgery)  Analgesic prn  lost to follow  will reach out to ortho - patient may have been lost " "to follow - has left arm splint since Jan  Per Dr. Fierro, pt OK to f/u OP  Pt states surgery per Dr. Medley    Physical deconditioning  PT/OT  Maintain fall precautions      HTN (hypertension)  Chronic, controlled.  Latest blood pressure and vitals reviewed-     Temp:  [97.5 °F (36.4 °C)-97.8 °F (36.6 °C)]   Pulse:  [69-96]   Resp:  [16-20]   BP: ()/(62-76)   SpO2:  [94 %-100 %] .   Home meds for hypertension were reviewed and noted below-  Hypertension Medications               quinapriL (ACCUPRIL) 40 MG tablet Take 40 mg by mouth once daily.            Will utilize p.r.n. blood pressure medication only if patient's blood pressure greater than 180/110 and she develops symptoms such as worsening chest pain or shortness of breath.     Lisinopril held due to slight hypotension; resume when able.       VTE Risk Mitigation (From admission, onward)           Ordered     Place DAVON hose  Until discontinued         03/27/24 1510     enoxaparin injection 40 mg  Every 12 hours        Note to Pharmacy: Ht: 5' 3" (1.6 m)  Wt: 113.4 kg (250 lb)  Estimated Creatinine Clearance: 80.5 mL/min (based on SCr of 0.8 mg/dL).  Body mass index is 44.29 kg/m².    03/25/24 2252     IP VTE HIGH RISK PATIENT  Once         03/25/24 2244     Place sequential compression device  Until discontinued         03/25/24 2244                    Discharge Planning   JORGE L:      Code Status: Full Code   Is the patient medically ready for discharge?:     Reason for patient still in hospital (select all that apply): Patient trending condition and Treatment  Discharge Plan A: Home with family                  Pat Au NP  Department of Hospital Medicine   [facility]    "

## 2024-03-31 NOTE — RESPIRATORY THERAPY
03/30/24 1952   Patient Assessment/Suction   Level of Consciousness (AVPU) alert   Respiratory Effort Normal;Unlabored   Expansion/Accessory Muscles/Retractions no retractions;no use of accessory muscles   All Lung Fields Breath Sounds clear;diminished   Rhythm/Pattern, Respiratory pattern regular;unlabored   Cough Frequency infrequent   Cough Type nonproductive   Skin Integrity   $ Wound Care Tech Time 15 min   Area Observed Left;Right;Behind ear;Cheek;Nares   Skin Appearance without discoloration   PRE-TX-O2   Device (Oxygen Therapy) nasal cannula   Flow (L/min) 2   SpO2 95 %   Pulse Oximetry Type Intermittent   $ Pulse Oximetry - Multiple Charge Pulse Oximetry - Multiple   Pulse 74   Resp 16   Temp 97.7 °F (36.5 °C)   BP (!) 144/76   Aerosol Therapy   $ Aerosol Therapy Charges Aerosol Treatment   Daily Review of Necessity (SVN) completed   Respiratory Treatment Status (SVN) given   Treatment Route (SVN) mask;oxygen   Patient Position (SVN) HOB elevated   Post Treatment Assessment (SVN) breath sounds unchanged   Signs of Intolerance (SVN) none   Breath Sounds Post-Respiratory Treatment   Throughout All Fields Post-Treatment All Fields   Throughout All Fields Post-Treatment no change   Post-treatment Heart Rate (beats/min) 72   Post-treatment Resp Rate (breaths/min) 16   Incentive Spirometer   $ Incentive Spirometer Charges done with encouragement   Incentive Spirometer Predicted Level (mL) 1440   Administration (IS) self-administered;proper technique demonstrated   Number of Repetitions (IS) 10   Level Incentive Spirometer (mL) 1000   Patient Tolerance (IS) good   Education   $ Education Bronchodilator;Other (see comment);15 min  (IS)

## 2024-03-31 NOTE — ASSESSMENT & PLAN NOTE
IV antibiotics   Mucinex   Duonebs scheduled/PRN  Blood cultures NGTD  Sputum culture ordered and pending collection - nonproductive cough, unable to collect   Supplemental oxygen as needed  Repeat procalcitonin - downtrending  IS at bedside   Add short course of prednisone  MRSA nasal swab negative   Respiratory infection panel negative

## 2024-03-31 NOTE — ASSESSMENT & PLAN NOTE
Chronic, controlled.  Latest blood pressure and vitals reviewed-     Temp:  [97.5 °F (36.4 °C)-97.8 °F (36.6 °C)]   Pulse:  [69-96]   Resp:  [16-20]   BP: ()/(62-76)   SpO2:  [94 %-100 %] .   Home meds for hypertension were reviewed and noted below-  Hypertension Medications               quinapriL (ACCUPRIL) 40 MG tablet Take 40 mg by mouth once daily.            Will utilize p.r.n. blood pressure medication only if patient's blood pressure greater than 180/110 and she develops symptoms such as worsening chest pain or shortness of breath.     Lisinopril held due to slight hypotension; resume when able.

## 2024-03-31 NOTE — CARE UPDATE
03/31/24 1144   Room Air SpO2 At Rest   Room Air SpO2 At Rest (!) 81 %   Ambulation SpO2 Evaluation on O2   SpO2 During Ambulation on O2 92 %   Heart Rate on O2 86 bpm   Ambulation O2 LPM 3 LPM   SpO2 On Post Ambulation   SpO2 Post Ambulation 91 %   Post Ambulation Heart Rate 81 bpm   Post Ambulation O2 LPM 3 LPM   Home O2 Eval Comments Pt needs 3LPM

## 2024-04-01 VITALS
SYSTOLIC BLOOD PRESSURE: 96 MMHG | HEART RATE: 89 BPM | DIASTOLIC BLOOD PRESSURE: 61 MMHG | WEIGHT: 250 LBS | HEIGHT: 63 IN | RESPIRATION RATE: 16 BRPM | OXYGEN SATURATION: 99 % | TEMPERATURE: 98 F | BODY MASS INDEX: 44.3 KG/M2

## 2024-04-01 LAB
ALBUMIN SERPL BCP-MCNC: 3.1 G/DL (ref 3.5–5.2)
ALP SERPL-CCNC: 93 U/L (ref 55–135)
ALT SERPL W/O P-5'-P-CCNC: 16 U/L (ref 10–44)
ANION GAP SERPL CALC-SCNC: 6 MMOL/L (ref 8–16)
AST SERPL-CCNC: 27 U/L (ref 10–40)
BASOPHILS # BLD AUTO: 0.04 K/UL (ref 0–0.2)
BASOPHILS NFR BLD: 0.6 % (ref 0–1.9)
BILIRUB SERPL-MCNC: 0.2 MG/DL (ref 0.1–1)
BUN SERPL-MCNC: 13 MG/DL (ref 8–23)
CALCIUM SERPL-MCNC: 8.8 MG/DL (ref 8.7–10.5)
CHLORIDE SERPL-SCNC: 102 MMOL/L (ref 95–110)
CO2 SERPL-SCNC: 32 MMOL/L (ref 23–29)
CREAT SERPL-MCNC: 0.5 MG/DL (ref 0.5–1.4)
DIFFERENTIAL METHOD BLD: ABNORMAL
EOSINOPHIL # BLD AUTO: 0.3 K/UL (ref 0–0.5)
EOSINOPHIL NFR BLD: 4.8 % (ref 0–8)
ERYTHROCYTE [DISTWIDTH] IN BLOOD BY AUTOMATED COUNT: 13.9 % (ref 11.5–14.5)
EST. GFR  (NO RACE VARIABLE): >60 ML/MIN/1.73 M^2
GLUCOSE SERPL-MCNC: 102 MG/DL (ref 70–110)
HCT VFR BLD AUTO: 36.1 % (ref 37–48.5)
HGB BLD-MCNC: 11.3 G/DL (ref 12–16)
IMM GRANULOCYTES # BLD AUTO: 0.12 K/UL (ref 0–0.04)
IMM GRANULOCYTES NFR BLD AUTO: 1.8 % (ref 0–0.5)
LYMPHOCYTES # BLD AUTO: 2 K/UL (ref 1–4.8)
LYMPHOCYTES NFR BLD: 30.3 % (ref 18–48)
MAGNESIUM SERPL-MCNC: 2.2 MG/DL (ref 1.6–2.6)
MCH RBC QN AUTO: 30.6 PG (ref 27–31)
MCHC RBC AUTO-ENTMCNC: 31.3 G/DL (ref 32–36)
MCV RBC AUTO: 98 FL (ref 82–98)
MONOCYTES # BLD AUTO: 0.6 K/UL (ref 0.3–1)
MONOCYTES NFR BLD: 9.2 % (ref 4–15)
NEUTROPHILS # BLD AUTO: 3.5 K/UL (ref 1.8–7.7)
NEUTROPHILS NFR BLD: 53.3 % (ref 38–73)
NRBC BLD-RTO: 0 /100 WBC
PLATELET # BLD AUTO: 262 K/UL (ref 150–450)
PMV BLD AUTO: 9 FL (ref 9.2–12.9)
POTASSIUM SERPL-SCNC: 4.1 MMOL/L (ref 3.5–5.1)
PROT SERPL-MCNC: 6.5 G/DL (ref 6–8.4)
RBC # BLD AUTO: 3.69 M/UL (ref 4–5.4)
SODIUM SERPL-SCNC: 140 MMOL/L (ref 136–145)
WBC # BLD AUTO: 6.64 K/UL (ref 3.9–12.7)

## 2024-04-01 PROCEDURE — 83735 ASSAY OF MAGNESIUM: CPT | Performed by: INTERNAL MEDICINE

## 2024-04-01 PROCEDURE — 80053 COMPREHEN METABOLIC PANEL: CPT | Performed by: INTERNAL MEDICINE

## 2024-04-01 PROCEDURE — 25000003 PHARM REV CODE 250: Performed by: NURSE PRACTITIONER

## 2024-04-01 PROCEDURE — 25000003 PHARM REV CODE 250: Performed by: INTERNAL MEDICINE

## 2024-04-01 PROCEDURE — 99900031 HC PATIENT EDUCATION (STAT)

## 2024-04-01 PROCEDURE — 99900035 HC TECH TIME PER 15 MIN (STAT)

## 2024-04-01 PROCEDURE — 94640 AIRWAY INHALATION TREATMENT: CPT

## 2024-04-01 PROCEDURE — 63600175 PHARM REV CODE 636 W HCPCS: Performed by: NURSE PRACTITIONER

## 2024-04-01 PROCEDURE — 63600175 PHARM REV CODE 636 W HCPCS: Performed by: INTERNAL MEDICINE

## 2024-04-01 PROCEDURE — 94761 N-INVAS EAR/PLS OXIMETRY MLT: CPT

## 2024-04-01 PROCEDURE — 27000221 HC OXYGEN, UP TO 24 HOURS

## 2024-04-01 PROCEDURE — 85025 COMPLETE CBC W/AUTO DIFF WBC: CPT | Performed by: INTERNAL MEDICINE

## 2024-04-01 PROCEDURE — 36415 COLL VENOUS BLD VENIPUNCTURE: CPT | Performed by: INTERNAL MEDICINE

## 2024-04-01 PROCEDURE — 97116 GAIT TRAINING THERAPY: CPT

## 2024-04-01 PROCEDURE — 97535 SELF CARE MNGMENT TRAINING: CPT

## 2024-04-01 PROCEDURE — 25000242 PHARM REV CODE 250 ALT 637 W/ HCPCS: Performed by: NURSE PRACTITIONER

## 2024-04-01 RX ORDER — CEFUROXIME AXETIL 250 MG/1
250 TABLET ORAL EVERY 12 HOURS
Qty: 10 TABLET | Refills: 0 | Status: SHIPPED | OUTPATIENT
Start: 2024-04-01 | End: 2024-04-08

## 2024-04-01 RX ORDER — AZITHROMYCIN 250 MG/1
250 TABLET, FILM COATED ORAL DAILY
Qty: 5 TABLET | Refills: 0 | Status: SHIPPED | OUTPATIENT
Start: 2024-04-01 | End: 2024-04-08

## 2024-04-01 RX ORDER — GUAIFENESIN 600 MG/1
600 TABLET, EXTENDED RELEASE ORAL 2 TIMES DAILY
Qty: 20 TABLET | Refills: 0 | Status: SHIPPED | OUTPATIENT
Start: 2024-04-01 | End: 2024-04-11

## 2024-04-01 RX ORDER — PREDNISONE 20 MG/1
20 TABLET ORAL DAILY
Qty: 2 TABLET | Refills: 0 | Status: SHIPPED | OUTPATIENT
Start: 2024-04-02 | End: 2024-04-05

## 2024-04-01 RX ORDER — ALBUTEROL SULFATE 90 UG/1
2 AEROSOL, METERED RESPIRATORY (INHALATION) EVERY 6 HOURS PRN
Qty: 8.5 G | Refills: 0 | Status: SHIPPED | OUTPATIENT
Start: 2024-04-01 | End: 2024-05-03

## 2024-04-01 RX ADMIN — AZITHROMYCIN DIHYDRATE 500 MG: 500 INJECTION, POWDER, LYOPHILIZED, FOR SOLUTION INTRAVENOUS at 12:04

## 2024-04-01 RX ADMIN — PANTOPRAZOLE SODIUM 40 MG: 40 TABLET, DELAYED RELEASE ORAL at 09:04

## 2024-04-01 RX ADMIN — GUAIFENESIN 600 MG: 600 TABLET, EXTENDED RELEASE ORAL at 09:04

## 2024-04-01 RX ADMIN — IPRATROPIUM BROMIDE AND ALBUTEROL SULFATE 3 ML: 2.5; .5 SOLUTION RESPIRATORY (INHALATION) at 07:04

## 2024-04-01 RX ADMIN — CEFTRIAXONE SODIUM 1 G: 1 INJECTION, POWDER, FOR SOLUTION INTRAMUSCULAR; INTRAVENOUS at 12:04

## 2024-04-01 RX ADMIN — FERROUS SULFATE TAB 325 MG (65 MG ELEMENTAL FE) 1 EACH: 325 (65 FE) TAB at 09:04

## 2024-04-01 RX ADMIN — PREDNISONE 20 MG: 20 TABLET ORAL at 09:04

## 2024-04-01 RX ADMIN — SUCRALFATE 1 G: 1 TABLET ORAL at 04:04

## 2024-04-01 RX ADMIN — NYSTATIN 500000 UNITS: 100000 SUSPENSION ORAL at 12:04

## 2024-04-01 RX ADMIN — ENOXAPARIN SODIUM 40 MG: 40 INJECTION SUBCUTANEOUS at 09:04

## 2024-04-01 RX ADMIN — SUCRALFATE 1 G: 1 TABLET ORAL at 06:04

## 2024-04-01 RX ADMIN — SUCRALFATE 1 G: 1 TABLET ORAL at 12:04

## 2024-04-01 RX ADMIN — HYDROCODONE BITARTRATE AND ACETAMINOPHEN 1 TABLET: 5; 325 TABLET ORAL at 03:04

## 2024-04-01 RX ADMIN — PREGABALIN 200 MG: 75 CAPSULE ORAL at 09:04

## 2024-04-01 RX ADMIN — NYSTATIN 500000 UNITS: 100000 SUSPENSION ORAL at 09:04

## 2024-04-01 RX ADMIN — PREGABALIN 200 MG: 75 CAPSULE ORAL at 03:04

## 2024-04-01 RX ADMIN — IPRATROPIUM BROMIDE AND ALBUTEROL SULFATE 3 ML: 2.5; .5 SOLUTION RESPIRATORY (INHALATION) at 02:04

## 2024-04-01 NOTE — NURSING
RN Navigator met with patient at bedside to discuss scheduling tcc/hospital follow up appt. upon discharge. Pt is A&Ox4 and lying in bed w/hob slightly elevated.    Pt is agreeable to schedule hospital follow up appt at [facility]. RN Navigator informed pt of scheduled appointment date and time, and patient reminded to bring portable home O2 if used, as well as all medication bottles to Discharge Clinic follow up appointment. She Lakisha who is the person who drives her to her appts on her cell phone to okay date and time of appt. Discharge Clinic information handout, appointment card/letter provided to patient.  Pt. reminded to call [facility] Contact number, [phone], with any questions or if appointment needs to   rescheduled. Transportation:by Lkaisha  who is a friend who drives and accompanies her to her medical appts.

## 2024-04-01 NOTE — PLAN OF CARE
03/31/24 2046   Patient Assessment/Suction   Level of Consciousness (AVPU) alert   Respiratory Effort Normal;Unlabored   Expansion/Accessory Muscles/Retractions no use of accessory muscles   All Lung Fields Breath Sounds clear   Rhythm/Pattern, Respiratory depth regular;pattern regular;unlabored   Cough Frequency infrequent   Cough Type dry;nonproductive   Skin Integrity   $ Wound Care Tech Time 15 min   Area Observed Left;Right;Behind ear;Nares   Skin Appearance without discoloration   PRE-TX-O2   Device (Oxygen Therapy) nasal cannula   Flow (L/min) 3   SpO2 95 %   Pulse Oximetry Type Intermittent   $ Pulse Oximetry - Multiple Charge Pulse Oximetry - Multiple   Pulse 87   Resp 18   Aerosol Therapy   $ Aerosol Therapy Charges Aerosol Treatment   Daily Review of Necessity (SVN) completed   Respiratory Treatment Status (SVN) given   Treatment Route (SVN) mask;oxygen   Patient Position (SVN) Andersen's;HOB elevated   Post Treatment Assessment (SVN) breath sounds improved   Signs of Intolerance (SVN) none   Incentive Spirometer   $ Incentive Spirometer Charges done with encouragement   Administration (IS) mouthpiece utilized;proper technique demonstrated;self-administered   Number of Repetitions (IS) 10   Level Incentive Spirometer (mL) 1000   Patient Tolerance (IS) good;no adverse signs/symptoms present   Education   $ Education Bronchodilator;15 min

## 2024-04-01 NOTE — PLAN OF CARE
Patient cleared for discharge from case management standpoint.    Follow up appointments scheduled and added to AVS.    Chart and discharge orders reviewed.  Patient discharged home with no further case management needs.    Pt awaiting delivery of home oxygen. Per Yokasta (Mal), oxygen en route to the hospital.    Addendum 1343 - [facility] out of network with Pt's insurance, per Yokasta. Oxygen order forwarded to [facility]; Mal to deliver cane to Pt's home.       04/01/24 1230   Final Note   Assessment Type Final Discharge Note   Anticipated Discharge Disposition Home   What phone number can be called within the next 1-3 days to see how you are doing after discharge? [phone]   Hospital Resources/Appts/Education Provided Provided patient/caregiver with written discharge plan information;Post-Acute resouces added to AVS;Appointments scheduled and added to AVS   Post-Acute Status   Post-Acute Authorization Home Health;HME   HME Status (!) Pending Delivery   Home Health Status Patient declined/refused   Coverage Payor:   [employer] MANAGED MEDICARE - [employer] [facility] HMO PPO SPECIAL NEEDS -   Discharge Delays None known at this time

## 2024-04-01 NOTE — CARE UPDATE
04/01/24 0738   Patient Assessment/Suction   Level of Consciousness (AVPU) alert   Respiratory Effort Normal;Unlabored   Expansion/Accessory Muscles/Retractions no use of accessory muscles;no retractions   All Lung Fields Breath Sounds Anterior:;Posterior:;diminished;clear;Lateral:   Rhythm/Pattern, Respiratory unlabored   PRE-TX-O2   Device (Oxygen Therapy) nasal cannula   $ Is the patient on Low Flow Oxygen? Yes   Flow (L/min) 3   SpO2 97 %   Pulse Oximetry Type Intermittent   $ Pulse Oximetry - Multiple Charge Pulse Oximetry - Multiple   Pulse 76   Resp 18   Aerosol Therapy   $ Aerosol Therapy Charges Aerosol Treatment   Daily Review of Necessity (SVN) completed   Respiratory Treatment Status (SVN) given   Treatment Route (SVN) oxygen;mask   Patient Position (SVN) Andersen's;HOB elevated   Post Treatment Assessment (SVN) breath sounds improved   Signs of Intolerance (SVN) none   Breath Sounds Post-Respiratory Treatment   Throughout All Fields Post-Treatment All Fields   Throughout All Fields Post-Treatment aeration increased;Posterior:;Anterior:;Lateral:   Post-treatment Heart Rate (beats/min) 76   Post-treatment Resp Rate (breaths/min) 18   Incentive Spirometer   $ Incentive Spirometer Charges done independently per patient   Incentive Spirometer Predicted Level (mL) 1440   Administration (IS) self-administered   Number of Repetitions (IS) 10   Level Incentive Spirometer (mL) 1500   Patient Tolerance (IS) good;no adverse signs/symptoms present   Education   $ Education Bronchodilator;15 min

## 2024-04-01 NOTE — PLAN OF CARE
Oxygen referral sent to [facility]. Quad cane sent to [facility] for review.    Pt declined home health.      Addendum 1207 - Per Yokasta (Mal), [facility] (Mal) to deliver oxygen to Pt at bedside.       04/01/24 1138   Post-Acute Status   Post-Acute Authorization Home Health;[facility] Status Referrals Sent   Home Health Status Patient declined/refused   Discharge Plan   Discharge Plan A Home with family   Discharge Plan B Home with family

## 2024-04-01 NOTE — PT/OT/SLP PROGRESS
"Occupational Therapy   Treatment    Name: Megha Robb  MRN: [id number]  Admitting Diagnosis:  Acute hypoxic respiratory failure       Recommendations:     Discharge Recommendations: Low Intensity Therapy  Discharge Equipment Recommendations:  none  Barriers to discharge: decreased activity tolerance     Assessment:     Megha Robb is a 69 y.o. female with a medical diagnosis of Acute hypoxic respiratory failure.  Performance deficits affecting function are weakness, impaired endurance, impaired self care skills, impaired functional mobility, gait instability, decreased safety awareness, impaired cardiopulmonary response to activity, decreased lower extremity function, decreased upper extremity function. Patient reports she has not scheduled a post surgical follow up appointment for the left upper extremity.    Rehab Prognosis:  Fair; patient would benefit from acute skilled OT services to address these deficits and reach maximum level of function.       Plan:     Patient to be seen 3 x/week to address the above listed problems via self-care/home management, therapeutic activities, therapeutic exercises  Plan of Care Expires: 04/26/24  Plan of Care Reviewed with: patient    Subjective     Chief Complaint: "ready to leave and wanting chocolate candy"  Patient/Family Comments/goals: return home   Pain/Comfort:  Pain Rating 1: 0/10  Pain Rating Post-Intervention 1: 0/10    Objective:     Communicated with: nurse prior to session.  Patient found HOB elevated with oxygen, peripheral IV, telemetry, bed alarm upon OT entry to room.    General Precautions: Standard, fall    Orthopedic Precautions:N/A  Braces: N/A  Respiratory Status: Nasal cannula, flow 3 L/min     Occupational Performance:     Bed Mobility:    Patient completed Rolling/Turning to Left with  stand by assistance  Patient completed Scooting/Bridging with stand by assistance  Patient completed Supine to Sit with stand by assistance     Activities of " Daily Living:  Lower Body Dressing: stand by assistance/setup for donning socks while seated at edge of bed     Treatment & Education:  Patient was encouraged to continue home health therapy and reviewed the importance of activity each day, discharge planning and equipment needs and reviewed use of call bell for assistance.    Patient left HOB elevated with all lines intact, call button in reach, and bed alarm on    GOALS:   Multidisciplinary Problems       Occupational Therapy Goals          Problem: Occupational Therapy    Goal Priority Disciplines Outcome Interventions   Occupational Therapy Goal     OT, PT/OT Ongoing, Progressing    Description: Goals to be met by: 4/26/24     Patient will increase functional independence with ADLs by performing:    UE Dressing with Supervision.  LE Dressing with Supervision.  Grooming while standing at sink with Supervision.  Toileting from toilet with Supervision for hygiene and clothing management.   Toilet transfer to toilet with Supervision.                         Time Tracking:     OT Date of Treatment: 04/01/24  OT Start Time: 0942  OT Stop Time: 0953  OT Total Time (min): 11 min    Billable Minutes:Self Care/Home Management 11    OT/MERLY: OT          4/1/2024

## 2024-04-01 NOTE — PLAN OF CARE
Per Yokasta (Mal), agency out of network with Pt's insurance. Referral forwarded to [facility].  confirmed delivery of oxygen to Pt at bedside.     Quad cane to be delivered to Pt at home, per Yokasta.    Pt awaiting family transportation.    Patient with no further case management needs.       04/01/24 1615   Final Note   Assessment Type Final Discharge Note   Anticipated Discharge Disposition Home   Post-Acute Status   Post-Acute Authorization HME   HME Status Set-up Complete/Auth obtained   Discharge Delays None known at this time

## 2024-04-01 NOTE — PROGRESS NOTES
Per Riana hernandez/[facility] pt navigator, patient scheduled at discharge clinic on 4/9/24 at 11:00 am   04/01/24 1224   Post-Acute Status   Hospital Resources/Appts/Education Provided Appointments scheduled and added to AVS

## 2024-04-01 NOTE — DISCHARGE SUMMARY
[facility]  Discharge Summary      Patient Name: Megha Robb  MRN: [id number]  PHANI: [id number]  Patient Class: IP- Inpatient  Admission Date: 3/25/2024  Hospital Length of Stay: 7 days  Discharge Date and Time:  04/01/2024 1:24 PM  Attending Physician: Clarissa Marie MD   Discharging Provider: Pat Au NP  Primary Care Provider: No primary care provider on file.    Primary Care Team: Networked reference to record PCT     HPI:   This is a 70 y/o  female w/ pmh of left forearm fx (s/p recent surgery), HTN who p/w CC of SOB and cough. Patient reports 1-day h/o SOB associated w/ a dry cough; denies being on home O2. Patient denies headache, blurry vision, chest pain, abdominal pain, N/V and dysuria. Patient's tmax is 100; hemodynamically stable; requiring 4 L of O2 via NC. Procalcitonin noted to be elevated at 3.592. Patient tested negative for Influenza and Covid. CXR manifested bilateral nodular patchy parenchymal opacities which are new consider correlation with contrast-enhanced chest CT examination.     * No surgery found *      Hospital Course:   69 y.o. F with recent history of radial head fracture, presents with worsening shortness of breath. CT chest revealed findings suggestive of multifocal pneumonia, scattered bilateral nodular densities likely infectious/inflammatory process - recommend 3 month follow up CT after treatment. Patient noted to have allergy to PCN and vanc - therefore was started on Linezolid and cefepime. Patient required supplemental oxygen.  Blood cultures obtained, NGTD.  Sputum culture ordered and pending.  Consult to Ortho, patient is to follow up as an outpatient per Dr Fierro.  Respiratory status slowly improving, supplemental oxygen has been titrated down to 2 L.  Respiratory infection panel negative.  MRSA nasal swab negative.  Antibiotics changed to rocephin/azithromycin.  Prednisone added for wheezing.  IS added.  Home  oxygen evaluation done and patient qualified for home oxygen which has been ordered.  Patient seen and examined day of discharge.  She is stable for discharge home with home health once oxygen for home use is delivered.  She is to follow up with PCP, Ortho, and Pulmonology in 1 week.        Goals of Care Treatment Preferences:  Code Status: Full Code      Consults:   Consults (From admission, onward)          Status Ordering Provider     Inpatient consult to Midline team  Once        Provider:  (Not yet assigned)    Completed ASHELY RAMIREZ            No new Assessment & Plan notes have been filed under this hospital service since the last note was generated.  Service: Hospital Medicine    Final Active Diagnoses:    Diagnosis Date Noted POA    PRINCIPAL PROBLEM:  Acute hypoxic respiratory failure secondary to Pneumonia [J96.01] 03/25/2024 Yes    Multifocal pneumonia [J18.9] 03/27/2024 Yes    Bilateral lower extremity edema [R60.0] 03/27/2024 Yes    Physical deconditioning [R53.81] 03/25/2024 Yes    Status post surgery (recent left forearm surgery) [Z98.890] 03/25/2024 Not Applicable    HTN (hypertension) [I10] 11/08/2012 Yes      Problems Resolved During this Admission:       Discharged Condition: stable    Disposition: Home-Health Care [facility] [place] [facility]    Follow Up:   Follow-up Information       oBwen Fierro MD Follow up in 1 week(s).    Specialty: Orthopedic Surgery  Contact information:  [address] [place] [id number]  [phone]               [facility]. Go on 4/9/2024.    Specialty: Primary Care  Why: Hospital follow up scheduled for 11:00 a.m. on 4/9.  Contact information:  [address], [address] [phone]  Additional information:  [address]                         Patient Instructions:      OXYGEN FOR HOME USE     Order Specific Question Answer Comments   Liter Flow 3    Duration Continuous    Qualifying Test Performed at: [facility]    Oxygen  "saturation: 81    Portable mode: continuous    Route nasal cannula    Device: home concentrator with portable tanks    Length of need (in months): 99 mos    Patient condition with qualifying saturation Other - List qualifying diagnosis and code    Select a diagnosis & list the code in the comments Hypoxic respiratory failure [2187134]    Select a diagnosis & list the code in the comments Pneumonia [704357]    Height: 5' 3" (1.6 m)    Weight: 113.4 kg (250 lb)    Alternative treatment measures have been tried or considered and deemed clinically ineffective. Yes      CANE FOR HOME USE     Order Specific Question Answer Comments   Type of Cane: Narrow Quad    Height: 5' 3" (1.6 m)    Weight: 113.4 kg (250 lb)    Does patient have medical equipment at home? shower chair    Length of need (1-99 months): 99    Please check all that apply: Patient's condition impairs ambulation.      Ambulatory referral/consult to Home Health   Standing Status: Future   Referral Priority: Routine Referral Type: Home Health   Referral Reason: Specialty Services Required   Requested Specialty: Home Health Services   Number of Visits Requested: 1     Ambulatory referral/consult to Pulmonology   Standing Status: Future   Referral Priority: Routine Referral Type: Consultation   Referral Reason: Specialty Services Required   Requested Specialty: Pulmonary Disease   Number of Visits Requested: 1     Diet Cardiac     Notify your health care provider if you experience any of the following:  temperature >100.4     Notify your health care provider if you experience any of the following:  persistent nausea and vomiting or diarrhea     Notify your health care provider if you experience any of the following:  severe uncontrolled pain     Notify your health care provider if you experience any of the following:  difficulty breathing or increased cough     Notify your health care provider if you experience any of the following:  persistent dizziness, " light-headedness, or visual disturbances     Notify your health care provider if you experience any of the following:  increased confusion or weakness     Activity as tolerated       Significant Diagnostic Studies: Labs: BMP:   Recent Labs   Lab 03/31/24  0802 04/01/24  0752   GLU 99 102    140   K 3.5 4.1    102   CO2 32* 32*   BUN 9 13   CREATININE 0.5 0.5   CALCIUM 9.0 8.8   MG 2.1 2.2   , CMP   Recent Labs   Lab 03/31/24  0802 04/01/24  0752    140   K 3.5 4.1    102   CO2 32* 32*   GLU 99 102   BUN 9 13   CREATININE 0.5 0.5   CALCIUM 9.0 8.8   PROT 6.5 6.5   ALBUMIN 3.0* 3.1*   BILITOT 0.3 0.2   ALKPHOS 95 93   AST 36 27   ALT 18 16   ANIONGAP 6* 6*   , CBC   Recent Labs   Lab 03/31/24  0802 04/01/24  0752   WBC 5.49 6.64   HGB 11.4* 11.3*   HCT 36.3* 36.1*    262   , and All labs within the past 24 hours have been reviewed    Pending Diagnostic Studies:       None           Medications:  Reconciled Home Medications:      Medication List        START taking these medications      albuterol 90 mcg/actuation inhaler  Commonly known as: VENTOLIN HFA  Inhale 2 puffs into the lungs every 6 (six) hours as needed for Wheezing. Rescue     azithromycin 250 MG tablet  Commonly known as: Z-KEKE  Take 1 tablet (250 mg total) by mouth once daily. for 5 days     cefUROXime 250 MG tablet  Commonly known as: CEFTIN  Take 1 tablet (250 mg total) by mouth every 12 (twelve) hours. for 5 days     guaiFENesin 600 mg 12 hr tablet  Commonly known as: MUCINEX  Take 1 tablet (600 mg total) by mouth 2 (two) times daily. for 10 days     predniSONE 20 MG tablet  Commonly known as: DELTASONE  Take 1 tablet (20 mg total) by mouth once daily. for 2 days  Start taking on: April 2, 2024            CONTINUE taking these medications      ALPRAZolam 0.25 MG tablet  Commonly known as: XANAX  Take 0.25 mg by mouth 2 (two) times daily as needed. pateint unsure of dosage     amitriptyline 25 MG tablet  Commonly known as:  ELAVIL  Take 50 mg by mouth every evening.     busPIRone 10 MG tablet  Commonly known as: BUSPAR  Take 10 mg by mouth 2 (two) times daily.     CARAFATE 1 gram tablet  Generic drug: sucralfate  Take 1 g by mouth 4 (four) times daily before meals and nightly.     carisoprodoL 350 MG tablet  Commonly known as: SOMA  Take 350 mg by mouth 4 (four) times daily as needed. Patient unsure of mg or dosage     ferrous sulfate 325 (65 FE) MG EC tablet  Take 325 mg by mouth once daily.     FLUVIRIN [id number] 45 mcg (15 mcg x 3)/0.5 mL Susp  Generic drug: flu vaccine ts 2015-16(4yr,up)     HYDROcodone-acetaminophen  mg per tablet  Commonly known as: NORCO  Take 1 tablet by mouth 4 (four) times daily as needed for Pain.     hydrOXYzine pamoate 50 MG Cap  Commonly known as: VISTARIL  Take 50 mg by mouth once daily.     LYRICA 200 MG Cap  Generic drug: pregabalin  Take 200 mg by mouth 3 (three) times daily.     multivitamin per tablet  Commonly known as: THERAGRAN  Take 1 tablet by mouth once daily.     pantoprazole 40 MG tablet  Commonly known as: PROTONIX  Take 1 tablet (40 mg total) by mouth once daily.            STOP taking these medications      quinapriL 40 MG tablet  Commonly known as: ACCUPRIL              Indwelling Lines/Drains at time of discharge:   Lines/Drains/Airways       None                   Time spent on the discharge of patient: 35 minutes         Pat Au NP  Department of Hospital Medicine  [facility]

## 2024-04-01 NOTE — PT/OT/SLP PROGRESS
"Physical Therapy Treatment    Patient Name:  Megha Robb   MRN:  [id number]    Recommendations:     Discharge Recommendations: Low Intensity Therapy  Discharge Equipment Recommendations: none (pt has RW and cane)  Barriers to discharge: None    Assessment:     Megha Robb is a 69 y.o. female admitted with a medical diagnosis of Acute hypoxic respiratory failure.  She presents with the following impairments/functional limitations: weakness, impaired endurance, impaired functional mobility, impaired self care skills, gait instability, decreased safety awareness, impaired cardiopulmonary response to activity.    Pt found HOB elevated and agreeable to PT session. Pt reported she hopes to go home today. Pt tolerated session fairly well with vc needed during gait training for safe use of RW/avoiding rushing during activity for safety.    Rehab Prognosis: Fair; patient would benefit from acute skilled PT services to address these deficits and reach maximum level of function.    Recent Surgery: * No surgery found *      Plan:     During this hospitalization, patient to be seen 5 x/week to address the identified rehab impairments via gait training, therapeutic activities, therapeutic exercises and progress toward the following goals:    Plan of Care Expires:  04/26/24    Subjective     Chief Complaint: Pt reported her R knee "slips up" sometimes  Patient/Family Comments/goals: home with son/DIL on discharge  Pain/Comfort:  Pain Rating 1: 0/10  Pain Rating Post-Intervention 1: 0/10      Objective:     Communicated with ALEJANDRA Liang prior to session.  Patient found HOB elevated with oxygen, peripheral IV, telemetry, bed alarm upon PT entry to room.     General Precautions: Standard, fall  Orthopedic Precautions: N/A  Braces: N/A  Respiratory Status: Nasal cannula, flow 2 L/min     Functional Mobility:  Bed Mobility:     Scooting: independence  Supine to Sit: independence  Sit to Supine: independence  Transfers:     Sit to " Stand:  stand by assistance with rolling walker  Gait: x 60' with RW and SBA /vc for safety with RW and taking her time for increased safety(pt moving at a fast pace/rushing through activity.      AM-PAC 6 CLICK MOBILITY          Treatment & Education:  Pt was educated on the following: call light use, importance of OOB activity and functional mobility to negate the negative effects of prolonged bed rest during this hospitalization, safe transfers/ambulation and discharge planning recommendations/options.      Patient left HOB elevated with all lines intact, call button in reach, bed alarm on, and RN notified..    GOALS:   Multidisciplinary Problems       Physical Therapy Goals       Not on file              Multidisciplinary Problems (Resolved)          Problem: Physical Therapy    Goal Priority Disciplines Outcome Goal Variances Interventions   Physical Therapy Goal   (Resolved)     PT, PT/OT Met     Description: Goals to be met by: 24     Patient will increase functional independence with mobility by performin. Supine to sit with Supervision  2. Sit to stand transfer with Supervision  3. Bed to chair transfer with Supervision using Rolling Walker  4. Gait  x 150 feet with Supervision using Rolling Walker.                              Time Tracking:     PT Received On: 24  PT Start Time: 1053     PT Stop Time: 1102  PT Total Time (min): 9 min     Billable Minutes: Gait Training 9    Treatment Type: Treatment  PT/PTA: PT     Number of PTA visits since last PT visit: 0     2024

## 2024-04-03 ENCOUNTER — OFFICE VISIT (OUTPATIENT)
Dept: PULMONOLOGY | Facility: CLINIC | Age: 69
End: 2024-04-03
Payer: MEDICARE

## 2024-04-03 VITALS
DIASTOLIC BLOOD PRESSURE: 80 MMHG | OXYGEN SATURATION: 97 % | BODY MASS INDEX: 41.99 KG/M2 | HEART RATE: 84 BPM | HEIGHT: 63 IN | SYSTOLIC BLOOD PRESSURE: 110 MMHG | WEIGHT: 237 LBS

## 2024-04-03 DIAGNOSIS — R09.02 HYPOXIA: ICD-10-CM

## 2024-04-03 DIAGNOSIS — J18.9 MULTIFOCAL PNEUMONIA: ICD-10-CM

## 2024-04-03 PROCEDURE — 1111F DSCHRG MED/CURRENT MED MERGE: CPT | Mod: CPTII,S$GLB,, | Performed by: NURSE PRACTITIONER

## 2024-04-03 PROCEDURE — 3008F BODY MASS INDEX DOCD: CPT | Mod: CPTII,S$GLB,, | Performed by: NURSE PRACTITIONER

## 2024-04-03 PROCEDURE — 3074F SYST BP LT 130 MM HG: CPT | Mod: CPTII,S$GLB,, | Performed by: NURSE PRACTITIONER

## 2024-04-03 PROCEDURE — 99203 OFFICE O/P NEW LOW 30 MIN: CPT | Mod: S$GLB,,, | Performed by: NURSE PRACTITIONER

## 2024-04-03 PROCEDURE — 1125F AMNT PAIN NOTED PAIN PRSNT: CPT | Mod: CPTII,S$GLB,, | Performed by: NURSE PRACTITIONER

## 2024-04-03 PROCEDURE — 4010F ACE/ARB THERAPY RXD/TAKEN: CPT | Mod: CPTII,S$GLB,, | Performed by: NURSE PRACTITIONER

## 2024-04-03 PROCEDURE — 3079F DIAST BP 80-89 MM HG: CPT | Mod: CPTII,S$GLB,, | Performed by: NURSE PRACTITIONER

## 2024-04-03 PROCEDURE — 1159F MED LIST DOCD IN RCRD: CPT | Mod: CPTII,S$GLB,, | Performed by: NURSE PRACTITIONER

## 2024-04-03 NOTE — PROGRESS NOTES
SUBJECTIVE:    Patient ID: Megha Robb is a 69 y.o. female.    Chief Complaint: Establish Care, Shortness of Breath, and Pneumonia    HPI  Patient here today for hospital follow up for multifocal pneumonia.  The patient states she had a cold the week prior but then began feeling worse, coughing more and becoming short of breath.  She is a never smoker, no asthma history.  She was discharged with oxygen. She was discharged with antibiotics Monday night but has not received them yet from the pharmacy.  CT scan done in the hospital showed multifocal pneumonia with adenopathy.  She states she is not a person to get sick frequently.  She has no difficulty swallowing, no question of aspiration.  She denies anymore fever, feels like she is improving.    Past Medical History:   Diagnosis Date    Anemia     Cancer     female    Diverticulosis     Hypertension     Osteoarthritis of midfoot 09/11/2017     Past Surgical History:   Procedure Laterality Date    APPENDECTOMY      breast reduction      CHOLECYSTECTOMY      COLONOSCOPY N/A 7/28/2023    Procedure: COLONOSCOPY;  Surgeon: Bryson Srinivasan MD;  Location: [facility];  Service: Endoscopy;  Laterality: N/A;    ESOPHAGOGASTRODUODENOSCOPY N/A 8/18/2023    Procedure: EGD (ESOPHAGOGASTRODUODENOSCOPY);  Surgeon: Bryson Srinivasan MD;  Location: [facility];  Service: Endoscopy;  Laterality: N/A;    EYE SURGERY Bilateral     PHACO    GALLBLADDER SURGERY      GASTRIC RESTRICTION SURGERY      HYSTERECTOMY      JOINT REPLACEMENT      left knee replacement      april 2017    LIPOSUCTION      to legs x3    TONSILLECTOMY      TOTAL REDUCTION MAMMOPLASTY      tummy tuck      VARICOSE VEIN SURGERY      WRIST ARTHROPLASTY       Family History   Problem Relation Age of Onset    Heart attack Mother     Heart attack Father     Stomach cancer Other     Rectal cancer Other     Liver cancer Other     Esophageal cancer Other     Crohn's disease Other     Colon polyps Other     Colon  cancer Other         Social History:   Marital Status: Single  Occupation: Data Unavailable  Alcohol History:  reports current alcohol use of about 2.0 standard drinks of alcohol per week.  Tobacco History:  reports that she has never smoked. She has never used smokeless tobacco.  Drug History:  reports no history of drug use.    Review of patient's allergies indicates:   Allergen Reactions    Bee sting [allergen ext-venom-honey bee] Shortness Of Breath    Pcn [penicillins] Hives    Vancomycin analogues Rash       Current Outpatient Medications   Medication Sig Dispense Refill    amitriptyline (ELAVIL) 25 MG tablet Take 50 mg by mouth every evening.      busPIRone (BUSPAR) 10 MG tablet Take 10 mg by mouth 2 (two) times daily.      CARAFATE 1 gram tablet Take 1 g by mouth 4 (four) times daily before meals and nightly.      ferrous sulfate 325 (65 FE) MG EC tablet Take 325 mg by mouth once daily.       HYDROcodone-acetaminophen (NORCO)  mg per tablet Take 1 tablet by mouth 4 (four) times daily as needed for Pain.      hydrOXYzine pamoate (VISTARIL) 50 MG Cap Take 50 mg by mouth once daily.  1    LYRICA 200 mg Cap Take 200 mg by mouth 3 (three) times daily.      multivitamin (THERAGRAN) per tablet Take 1 tablet by mouth once daily.        pantoprazole (PROTONIX) 40 MG tablet Take 1 tablet (40 mg total) by mouth once daily. 90 tablet 1    albuterol (VENTOLIN HFA) 90 mcg/actuation inhaler Inhale 2 puffs into the lungs every 6 (six) hours as needed for Wheezing. Rescue 8.5 g 0    alprazolam (XANAX) 0.25 MG tablet Take 0.25 mg by mouth 2 (two) times daily as needed. pateint unsure of dosage      azithromycin (Z-KEKE) 250 MG tablet Take 1 tablet (250 mg total) by mouth once daily. for 5 days 5 tablet 0    carisoprodol (SOMA) 350 MG tablet Take 350 mg by mouth 4 (four) times daily as needed. Patient unsure of mg or dosage       cefUROXime (CEFTIN) 250 MG tablet Take 1 tablet (250 mg total) by mouth every 12 (twelve)  "hours. for 5 days 10 tablet 0    FLUVIRIN [id number] 45 mcg (15 mcg x 3)/0.5 mL Susp   0    guaiFENesin (MUCINEX) 600 mg 12 hr tablet Take 1 tablet (600 mg total) by mouth 2 (two) times daily. for 10 days 20 tablet 0    predniSONE (DELTASONE) 20 MG tablet Take 1 tablet (20 mg total) by mouth once daily. for 2 days 2 tablet 0     No current facility-administered medications for this visit.             03/25/2024  IMPRESSION:  1.  Findings as above suggest multifocal pneumonia.     2. Scattered bilateral nodular densities are likely infectious/inflammatory and related to multifocal pneumonia. 3 month follow-up CT is recommended to document resolution     3. Probable reactive mediastinal and hilar lymph nodes. Attention on follow-up is recommended       Review of Systems  General: feeling better, no fever   Eyes: Vision is good.  ENT:  No sinusitis or pharyngitis.   Heart:: No chest pain or palpitations.  Lungs: breathing improving   GI: No Nausea, vomiting, constipation, diarrhea, or reflux.  : No dysuria, hesitancy, or nocturia.  Musculoskeletal: chronic back pain   Skin: No lesions or rashes.  Neuro: No headaches or neuropathy.  Lymph: No edema or adenopathy.  Psych: No anxiety or depression.  Endo: No weight change.    OBJECTIVE:      /80 (BP Location: Right arm, Patient Position: Sitting, BP Method: Large (Manual))   Pulse 84   Ht 5' 3" (1.6 m)   Wt 107.5 kg (237 lb)   SpO2 97% Comment: 2lpm  BMI 41.98 kg/m²     Physical Exam  GENERAL: Older patient in no distress.  HEENT: Pupils equal and reactive. Extraocular movements intact. Nose intact.      Pharynx moist.  NECK: Supple.   HEART: Regular rate and rhythm. No murmur or gallop auscultated.  LUNGS: Clear to auscultation and percussion. Lung excursion symmetrical. No change in fremitus. No adventitial noises.  ABDOMEN: Bowel sounds present. Non-tender, no masses palpated.  EXTREMITIES: Normal muscle tone and joint movement, no cyanosis or clubbing. "   LYMPHATICS: No adenopathy palpated, no edema.  SKIN: Dry, intact, no lesions.   NEURO: Cranial nerves II-XII intact. Motor strength 5/5 bilaterally, upper and lower extremities.  PSYCH: Appropriate affect.    Assessment:       1. Hypoxia    2. Multifocal pneumonia          Plan:         Follow up in about 2 weeks (around 4/17/2024).    Go  the antibiotics and start them  Will reassess if needs oxygen after her next follow up   Follow up CT scan of chest with contrast  in 3 months

## 2024-04-08 ENCOUNTER — PATIENT OUTREACH (OUTPATIENT)
Dept: ADMINISTRATIVE | Facility: CLINIC | Age: 69
End: 2024-04-08
Payer: MEDICARE

## 2024-04-08 NOTE — PROGRESS NOTES
C3 nurse attempted to contact Megha Robb for a TCC post hospital discharge follow up call. No answer. Left voicemail with callback information. The patient has a scheduled HOSFU appointment with [facility] on 04/10/24 @ 1100.

## 2024-04-09 NOTE — PROGRESS NOTES
3rd Attempt made to reach patient for TCC call. Left voicemail please call [phone] leave first name, last name, and  Malena will return your call.

## 2024-04-13 LAB
OHS QRS DURATION: 86 MS
OHS QTC CALCULATION: 414 MS

## 2024-05-28 ENCOUNTER — HOSPITAL ENCOUNTER (EMERGENCY)
Facility: HOSPITAL | Age: 69
Discharge: HOME | End: 2024-05-28
Attending: STUDENT IN AN ORGANIZED HEALTH CARE EDUCATION/TRAINING PROGRAM
Payer: MEDICARE

## 2024-05-28 VITALS
BODY MASS INDEX: 41.99 KG/M2 | WEIGHT: 237 LBS | HEART RATE: 68 BPM | OXYGEN SATURATION: 98 % | HEIGHT: 63 IN | DIASTOLIC BLOOD PRESSURE: 77 MMHG | TEMPERATURE: 98 F | SYSTOLIC BLOOD PRESSURE: 122 MMHG | RESPIRATION RATE: 17 BRPM

## 2024-05-28 DIAGNOSIS — M25.532 LEFT WRIST PAIN: ICD-10-CM

## 2024-05-28 DIAGNOSIS — M25.552 PAIN OF LEFT HIP: Primary | ICD-10-CM

## 2024-05-28 DIAGNOSIS — T14.90XA INJURY: ICD-10-CM
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
## 2024-05-28 PROCEDURE — 99284 EMERGENCY DEPT VISIT MOD MDM: CPT | Mod: 25

## 2024-05-28 PROCEDURE — 25000003 PHARM REV CODE 250: Performed by: STUDENT IN AN ORGANIZED HEALTH CARE EDUCATION/TRAINING PROGRAM

## 2024-05-28 RX ORDER — HYDROCODONE BITARTRATE AND ACETAMINOPHEN 5; 325 MG/1; MG/1
1 TABLET ORAL
Status: COMPLETED | OUTPATIENT
Start: 2024-05-28 | End: 2024-05-28

## 2024-05-28 RX ADMIN — HYDROCODONE BITARTRATE AND ACETAMINOPHEN 1 TABLET: 5; 325 TABLET ORAL at 08:05

## 2024-05-28 NOTE — FIRST PROVIDER EVALUATION
Emergency Department TeleTriage Encounter Note      CHIEF COMPLAINT    Chief Complaint   Patient presents with    Hip Pain     Pt had a fall and is c/o left hip down to her knee pain.        VITAL SIGNS   Initial Vitals [05/28/24 1429]   BP Pulse Resp Temp SpO2   (!) 147/102 102 18 98.2 °F (36.8 °C) (!) 93 %      MAP       --            ALLERGIES    Review of patient's allergies indicates:   Allergen Reactions    Bee sting [allergen ext-venom-honey bee] Shortness Of Breath    Pcn [penicillins] Hives    Vancomycin analogues Rash       PROVIDER TRIAGE NOTE  Verbal consent for the teletriage evaluation was given by the patient at the start of the evaluation.  All efforts will be made to maintain patient's privacy during the evaluation.      This is a teletriage evaluation of a 69 y.o. female presenting to the ED with c/o slip and fall, injured left hip, left upper leg, and left knee pain.  Also injured left forearm. Limited physical exam via telehealth: The patient is awake, alert, answering questions appropriately and is not in respiratory distress.  As the Teletriage provider, I performed an initial assessment and ordered appropriate labs and imaging studies, if any, to facilitate the patient's care once placed in the ED. Once a room is available, care and a full evaluation will be completed by an alternate ED provider.  Any additional orders and the final disposition will be determined by that provider.  All imaging and labs will not be followed-up by the Teletriage Team, including myself.      Repeat SPO2 96%    ORDERS  Labs Reviewed - No data to display    ED Orders (720h ago, onward)      Start Ordered     Status Ordering Provider    05/28/24 1440 05/28/24 1440  X-Ray Hip 2 or 3 views Left with Pelvis when performed  1 time imaging         Ordered RONIT SALAS    05/28/24 1440 05/28/24 1440  X-Ray Femur Ap/Lat Left  1 time imaging         Ordered RONIT SALAS    05/28/24 1440 05/28/24 1440  X-Ray Knee 3 View  Left  1 time imaging         Ordered RONIT SALAS    05/28/24 1440 05/28/24 1440  X-Ray Forearm Left  1 time imaging         Ordered RONIT SALAS              Virtual Visit Note: The provider triage portion of this emergency department evaluation and documentation was performed via [employer], a HIPAA-compliant telemedicine application, in concert with a tele-presenter in the room. A face to face patient evaluation with one of my colleagues will occur once the patient is placed in an emergency department room.      DISCLAIMER: This note was prepared with [employer] voice recognition transcription software. Garbled syntax, mangled pronouns, and other bizarre constructions may be attributed to that software system.

## 2024-05-29 NOTE — ED PROVIDER NOTES
Encounter Date: 5/28/2024       History     Chief Complaint   Patient presents with    Hip Pain     Pt had a fall and is c/o left hip down to her knee pain.      69 year old female presents for evaluation of left wrist pain, knee pain, left hip pain after a mechanical fall. She has been ambulatory since the incident. She denies other complaints at this time.      Review of patient's allergies indicates:   Allergen Reactions    Bee sting [allergen ext-venom-honey bee] Shortness Of Breath    Pcn [penicillins] Hives    Vancomycin analogues Rash     Past Medical History:   Diagnosis Date    Anemia     Cancer     female    Diverticulosis     Hypertension     Osteoarthritis of midfoot 09/11/2017     Past Surgical History:   Procedure Laterality Date    APPENDECTOMY      breast reduction      CHOLECYSTECTOMY      COLONOSCOPY N/A 7/28/2023    Procedure: COLONOSCOPY;  Surgeon: Bryson Srinivasan MD;  Location: [facility];  Service: Endoscopy;  Laterality: N/A;    ESOPHAGOGASTRODUODENOSCOPY N/A 8/18/2023    Procedure: EGD (ESOPHAGOGASTRODUODENOSCOPY);  Surgeon: Bryson Srinivasan MD;  Location: [facility];  Service: Endoscopy;  Laterality: N/A;    EYE SURGERY Bilateral     PHACO    GALLBLADDER SURGERY      GASTRIC RESTRICTION SURGERY      HYSTERECTOMY      JOINT REPLACEMENT      left knee replacement      april 2017    LIPOSUCTION      to legs x3    TONSILLECTOMY      TOTAL REDUCTION MAMMOPLASTY      tummy tuck      VARICOSE VEIN SURGERY      WRIST ARTHROPLASTY       Family History   Problem Relation Name Age of Onset    Heart attack Mother      Heart attack Father      Stomach cancer Other      Rectal cancer Other      Liver cancer Other      Esophageal cancer Other      Crohn's disease Other      Colon polyps Other      Colon cancer Other       Social History     Tobacco Use    Smoking status: Never    Smokeless tobacco: Never   Substance Use Topics    Alcohol use: Yes     Alcohol/week: 2.0 standard drinks of alcohol      Types: 1 Cans of beer, 1 Shots of liquor per week     Comment: weekends    Drug use: No     Review of Systems   Constitutional:  Negative for activity change, appetite change, chills, fever and unexpected weight change.   HENT:  Negative for dental problem and drooling.    Eyes:  Negative for discharge and itching.   Respiratory:  Negative for cough, chest tightness, shortness of breath, wheezing and stridor.    Cardiovascular:  Negative for chest pain, palpitations and leg swelling.   Gastrointestinal:  Negative for abdominal distention, abdominal pain, diarrhea and nausea.   Genitourinary:  Negative for difficulty urinating, dysuria, frequency and urgency.   Musculoskeletal:  Positive for arthralgias. Negative for back pain, gait problem and joint swelling.   Neurological:  Negative for dizziness, syncope, numbness and headaches.   Psychiatric/Behavioral:  Negative for agitation, behavioral problems and confusion.        Physical Exam     Initial Vitals [05/28/24 1429]   BP Pulse Resp Temp SpO2   (!) 147/102 102 18 98.2 °F (36.8 °C) (!) 93 %      MAP       --         Physical Exam    Nursing note and vitals reviewed.  Constitutional: She appears well-developed and well-nourished. She is not diaphoretic.   HENT:   Head: Normocephalic and atraumatic.   Mouth/Throat: Oropharynx is clear and moist.   Eyes: EOM are normal. Pupils are equal, round, and reactive to light. Right eye exhibits no discharge. Left eye exhibits no discharge.   Neck: No tracheal deviation present.   Normal range of motion.  Cardiovascular:  Normal rate, regular rhythm and intact distal pulses.           Pulmonary/Chest: No respiratory distress. She has no wheezes. She exhibits no tenderness.   Abdominal: Abdomen is soft. She exhibits no distension. There is no abdominal tenderness.   Musculoskeletal:         General: No tenderness or edema. Normal range of motion.      Cervical back: Normal range of motion.     Neurological: She is alert and  oriented to person, place, and time. She has normal strength. No cranial nerve deficit or sensory deficit. GCS eye subscore is 4. GCS verbal subscore is 5. GCS motor subscore is 6.   Skin: Skin is warm and dry. No rash noted.   Psychiatric: She has a normal mood and affect. Her behavior is normal. Thought content normal.         ED Course   Procedures  Labs Reviewed - No data to display       Imaging Results              X-Ray Knee Complete 4 or more Views Left (Final result)  Result time 05/28/24 20:10:30   Procedure changed from X-Ray Knee 3 View Left     Final result by Choco Chavarria MD (05/28/24 20:10:30)                   Impression:      Postoperative change of left total knee arthroplasty.  No definite radiographic evidence of acute displaced fracture or dislocation of the left knee.      Electronically signed by: Choco Chavarria MD  Date:    05/28/2024  Time:    20:10               Narrative:    EXAMINATION:  XR KNEE COMP 4 OR MORE VIEWS LEFT    CLINICAL HISTORY:  Injury, unspecified, initial encounterpain;pain;    TECHNIQUE:  AP, Lateral, Sunrise and Tunnel views of the left knee were preformed    COMPARISON:  07/09/2018    FINDINGS:  There is postoperative change of prior left total knee arthroplasty.  Hardware appears intact.  No definite acute displaced fracture or dislocation identified.  No large suprapatellar joint effusion appreciated radiographically.  There is nonspecific soft tissue edema about the left knee.                                       X-Ray Forearm Left (Final result)  Result time 05/28/24 18:50:05      Final result by Choco Chavarria MD (05/28/24 18:50:05)                   Impression:      No definite radiographic evidence of acute displaced fracture of the left wrist.  Interval fixation of previously demonstrated distal left radius fracture.      Electronically signed by: Choco Chavarria MD  Date:    05/28/2024  Time:    18:50               Narrative:    EXAMINATION:  XR  FOREARM LEFT    CLINICAL HISTORY:  Injury, unspecified, initial encounter    TECHNIQUE:  AP and lateral views of the left forearm were performed.    COMPARISON:  01/27/2024    FINDINGS:  There is postoperative change relating to interval internal fixation of previously demonstrated distal radius fracture.  Hardware appears intact without definite fracture.  No definite new acute fracture appreciated left forearm.  There is a previously demonstrated deformity of the ulnar styloid.  There are degenerative changes of the left wrist.  Scattered vascular calcifications are noted within the soft tissues.                                       X-Ray Femur Ap/Lat Left (Final result)  Result time 05/28/24 19:24:13      Final result by Choco Chavarria MD (05/28/24 19:24:13)                   Impression:      No definite radiographic evidence of acute displaced fracture or dislocation.      Electronically signed by: Choco Chavarria MD  Date:    05/28/2024  Time:    19:24               Narrative:    EXAMINATION:  XR HIP WITH PELVIS WHEN PERFORMED 2 OR 3 VIEWS LEFT; XR FEMUR 2 VIEW LEFT    CLINICAL HISTORY:  Injury, unspecified, initial encounter    TECHNIQUE:  AP view of the pelvis and frog leg lateral view of the left hip were performed.  AP and lateral views left femur were performed.    COMPARISON:  None    FINDINGS:  No definite radiographic evidence of acute displaced fracture or dislocation of the bony pelvis.  The bilateral femoral heads appear appropriately seated within the acetabula.  The ilioischial and iliopectineal lines appear maintained.  There are postoperative changes of the lumbosacral spine.    The more distal left femur is intact without definite acute displaced fracture.  There is postoperative change of prior left knee arthroplasty.                                       X-Ray Hip 2 or 3 views Left with Pelvis when performed (Final result)  Result time 05/28/24 19:24:13      Final result by Deon  Choco YOUNG MD (05/28/24 19:24:13)                   Impression:      No definite radiographic evidence of acute displaced fracture or dislocation.      Electronically signed by: Choco Chavarria MD  Date:    05/28/2024  Time:    19:24               Narrative:    EXAMINATION:  XR HIP WITH PELVIS WHEN PERFORMED 2 OR 3 VIEWS LEFT; XR FEMUR 2 VIEW LEFT    CLINICAL HISTORY:  Injury, unspecified, initial encounter    TECHNIQUE:  AP view of the pelvis and frog leg lateral view of the left hip were performed.  AP and lateral views left femur were performed.    COMPARISON:  None    FINDINGS:  No definite radiographic evidence of acute displaced fracture or dislocation of the bony pelvis.  The bilateral femoral heads appear appropriately seated within the acetabula.  The ilioischial and iliopectineal lines appear maintained.  There are postoperative changes of the lumbosacral spine.    The more distal left femur is intact without definite acute displaced fracture.  There is postoperative change of prior left knee arthroplasty.                                    X-Rays:   Independently Interpreted Readings:   Other Readings:  Left hip xr reviewed by me, no evidence of acute fracture    Medications   HYDROcodone-acetaminophen 5-325 mg per tablet 1 tablet (1 tablet Oral Given 5/28/24 2025)     Medical Decision Making  69 year old female presents for left knee, hip and wrist pain after a mechanical fall. Vitals normal. Exam notable for mild bruising to the left hip and left wrist, patient has full range of motion of all extremities. XR w/o evidence of fracture to the left knee, left femur, left hip or left wrist. Patient is ambulatory without difficulty, doubt occult fracture and she is stable for discharge with outpatient follow up and return precautions for worsening symptoms.     Risk  Prescription drug management.               ED Course as of 05/29/24 0437   Wed May 29, 2024   0435 X-Ray Knee Complete 4 or more Views Left  [BS]   0435 X-Ray Hip 2 or 3 views Left with Pelvis when performed [BS]   0435 X-Ray Femur Ap/Lat Left [BS]   0435 X-Ray Forearm Left [BS]      ED Course User Index  [BS] Jude Ellis MD                           Clinical Impression:  Final diagnoses:  [T14.90XA] Injury  [M25.552] Pain of left hip (Primary)  [W19.XXXA] Fall, initial encounter  [M25.532] Left wrist pain          ED Disposition Condition    Discharge Stable          ED Prescriptions    None       Follow-up Information       Follow up With Specialties Details Why Contact Info    Rahul Carolina MD Family Medicine Call in 1 day To set up a follow-up appointment, To recheck today's symptoms [address] [facility] [place] [id number]  [phone]               Jude Ellis MD  05/29/24 0437

## 2024-05-29 NOTE — DISCHARGE INSTRUCTIONS

## 2024-07-01 PROBLEM — J96.01 ACUTE HYPOXIC RESPIRATORY FAILURE: Status: RESOLVED | Noted: 2024-03-25 | Resolved: 2024-07-01

## 2024-07-01 PROBLEM — J18.9 MULTIFOCAL PNEUMONIA: Status: RESOLVED | Noted: 2024-03-27 | Resolved: 2024-07-01

## 2024-07-26 ENCOUNTER — OFFICE VISIT (OUTPATIENT)
Dept: PLASTIC SURGERY | Facility: CLINIC | Age: 69
End: 2024-07-26
Payer: MEDICARE

## 2024-07-26 VITALS
WEIGHT: 232 LBS | HEIGHT: 63 IN | HEART RATE: 79 BPM | BODY MASS INDEX: 41.11 KG/M2 | SYSTOLIC BLOOD PRESSURE: 132 MMHG | DIASTOLIC BLOOD PRESSURE: 76 MMHG

## 2024-07-26 DIAGNOSIS — N62 HYPERTROPHY OF BREAST: Primary | ICD-10-CM

## 2024-07-26 PROCEDURE — 99999 PR PBB SHADOW E&M-EST. PATIENT-LVL III: CPT | Mod: PBBFAC,,, | Performed by: SURGERY

## 2024-07-26 NOTE — PROGRESS NOTES
Patient presents to Plastic surgery Clinic with a chief complaint of heavy pendulous breasts.  Patient gives history of having a breast reduction in the past he is now seeking to have another reduction.  I have had a lengthy discussion with the patient regarding her previous surgery.  I am not certain as to whether she has had an inferior pedicle the superior pedicle on her breasts reduction.  I have discussed with the patient that before we develop any further into consideration for her reduction that I need to have the old operative note from [facility].  I have discussed with her that there is a high likelihood of nipple loss in revision area surgery and the previous operative note is key.  The patient has many medical problems as well as well as obesity.  Patient has had a gastric bypass in his I believe lost over 100 lb and still was around 260 lb.  I think it would be a surgery that we would have to have full medical clearance probably some weight loss prior to this and most important is the previous operative note.  Patient can return to the clinic when she obtains the operative note and we can further discuss her surgery.

## 2024-08-20 NOTE — PROGRESS NOTES
Subjective:      Patient ID: Megha Robb is a 62 y.o. female.    Chief Complaint: Post-op Evaluation of the Right Foot (DOS: 9-11-17/Bone spur excision )    Doing fairly well today s/p removal of bone spur right dorsal midfoot. She rates her pain as 6/10 today.    Social History     Occupational History    Not on file.     Social History Main Topics    Smoking status: Never Smoker    Smokeless tobacco: Never Used    Alcohol use 1.2 oz/week     1 Shots of liquor, 1 Cans of beer per week      Comment: weekends    Drug use: No    Sexual activity: Not on file            Objective:    Ortho Exam     RLE: Neurovascularly intact, incisions well healed, mild swelling, sutures are intact. No signs of infection. no drainage, mild  tenderness to palpation.     Assessment:     s/p removal of bone spur right dorsal midfoot.          Plan:       She wants to continue antibiotics as she says it is helping heal all of her other non-healing wounds of her body. I told her Dr. Patel will have to do that for her.   Weight bearing as tolerated.  No restrictions. Due to her h/o wound healing issues I do not recommend any further surgical  treatment.        
Myself/PA

## 2024-10-25 ENCOUNTER — OFFICE VISIT (OUTPATIENT)
Facility: CLINIC | Age: 69
End: 2024-10-25
Payer: MEDICARE

## 2024-10-25 VITALS
WEIGHT: 231.94 LBS | SYSTOLIC BLOOD PRESSURE: 109 MMHG | HEART RATE: 80 BPM | BODY MASS INDEX: 41.1 KG/M2 | HEIGHT: 63 IN | DIASTOLIC BLOOD PRESSURE: 73 MMHG

## 2024-10-25 DIAGNOSIS — M25.511 CHRONIC PAIN IN RIGHT SHOULDER: ICD-10-CM

## 2024-10-25 DIAGNOSIS — G89.29 CHRONIC PAIN IN RIGHT SHOULDER: ICD-10-CM

## 2024-10-25 DIAGNOSIS — M25.512 CHRONIC PAIN IN LEFT SHOULDER: ICD-10-CM

## 2024-10-25 DIAGNOSIS — M54.2 CERVICALGIA: ICD-10-CM

## 2024-10-25 DIAGNOSIS — N62 HYPERTROPHY OF BREAST: Primary | ICD-10-CM

## 2024-10-25 DIAGNOSIS — G89.29 CHRONIC PAIN IN LEFT SHOULDER: ICD-10-CM

## 2024-10-25 PROCEDURE — 99999 PR PBB SHADOW E&M-EST. PATIENT-LVL III: CPT | Mod: PBBFAC,,, | Performed by: SURGERY

## 2024-10-25 NOTE — PROGRESS NOTES
"History & Physical    SUBJECTIVE:   Chief complaint: large breasts    History of Present Illness:  Megha Robb re-presents to [facility] on 10/25/2024 for evaluation for bilateral breast reduction secondary to symptomatic bilateral large pendulous breast. She has a chief complaint of chronic neck and back pain for over 10 years. She has tried tylenol, ibuprofen, aleve without alleviation of pain. Pt also reports that she has had back surgery without any relief of back and neck pain. She sees pain management who rx muscle relaxants and narcotics without relief of symptoms. She also complains of deep shoulder grooving from her bra straps.  She currently reports a bra size of greater than DDD. She is disabled. She denies smoking tobacco or the use of any nicotine containing products.     Of note, pt had a previous breast reduction over 30 years ago at [facility]. She was instructed to bring her operative note. She states that she cannot find the operative report but she would like to proceed with discussing surgery    PMH:    Review of Systems:   HENT: Positive for neck pain.    Musculoskeletal: Positive for back pain.   Neurological: Negative for headaches or dizziness      OBJECTIVE:     /73   Pulse 80   Ht 5' 3" (1.6 m)   Wt 105.2 kg (231 lb 14.8 oz)   BMI 41.08 kg/m²       Physical Exam:  WD WN NAD  VSS  Normal resp effort  Chest: Effort normal and breath sounds normal. Bilaterally enlarged breasts, evidence of previous rashes, shoulder grooving, no palpable masses, nipple everted      Last MMG: Pt reports she had a mammogram in 2022 and it was normal.    ASSESSMENT/PLAN:     1.Symptomatic Bilateral Macromastia  2. Chronic neck pain  3. Chronic back pain  4. Chronic breast rashes    PLAN:Plan    -Pt was seen and evaluated by myself and Dr. Bowen Cortes.   -Plan for bilateral breast reduction and will need approximately 1800 gm reduction per side with the intention of " symptomatic relief. We also discussed the possibility of removing bilateral nipples. Pt and daughter fully understands that she is at an increased risk of losing her nipples as we do not have the operative report. Pt reports that she would still like to proceed with surgery. She states that she would be happy to get a nipple tattoo after surgery.   -Pt has agreed to pain contract: we will supply 1 week of pain medication and pain management will take over afterwards.   -Will need PCP clearance  -Will submit paper work for insurance approval  -Photos to be obtained  -Risk, benefits, and alternatives explained. She understands that the risks include but are not limited to bleeding, scarring, infection, pain, numbness, asymmetry, deformity, open wound, skin necrosis, wound dehiscence, permanent or temporary loss of sensation to the nipple, partial or total nipple loss requiring removal, nipple malposition, poor cosmetic outcome, hematoma, seroma and pulmonary emobolus. Discussed with patient that we will not remove any axillary breast tissue or any tissue towards the back as we will not flip the patient during surgery. The patient fully understands that she may have axillary fullness and would like to proceed.   - Patient understands that this may not relieve her of all of her symptoms; however, she wishes to proceed.   - Patient would like to proceed with scheduling bilateral breast reduction pending insurance authorization  - My time with the patient includes face to face time and non-face to face time preparing to see the patient (eg, review of tests), obtaining and/or reviewing separately obtained history, documenting clinical information in the electronic or other health record, independently interpreting results and communicating results to the patient/family/caregiver, or care coordinator.    All questions were answered. The patient was advised to call the clinic with any questions or concerns prior to their  next visit.     Phyllis Flores PA-C  Plastic and Reconstructive Surgery   [phone]

## 2024-11-15 ENCOUNTER — HOSPITAL ENCOUNTER (EMERGENCY)
Facility: HOSPITAL | Age: 69
Discharge: HOME | End: 2024-11-15
Attending: EMERGENCY MEDICINE
Payer: MEDICARE

## 2024-11-15 VITALS
HEART RATE: 102 BPM | OXYGEN SATURATION: 99 % | WEIGHT: 245.69 LBS | SYSTOLIC BLOOD PRESSURE: 134 MMHG | DIASTOLIC BLOOD PRESSURE: 65 MMHG | RESPIRATION RATE: 20 BRPM | TEMPERATURE: 100 F | BODY MASS INDEX: 45.21 KG/M2 | HEIGHT: 62 IN

## 2024-11-15 DIAGNOSIS — R05.1 ACUTE COUGH: Primary | ICD-10-CM

## 2024-11-15 DIAGNOSIS — R06.02 SOB (SHORTNESS OF BREATH): ICD-10-CM

## 2024-11-15 DIAGNOSIS — R10.32 LLQ ABDOMINAL PAIN: ICD-10-CM

## 2024-11-15 DIAGNOSIS — R06.02 SHORTNESS OF BREATH: ICD-10-CM

## 2024-11-15 DIAGNOSIS — K57.92 DIVERTICULITIS: ICD-10-CM

## 2024-11-15 LAB
ALBUMIN SERPL BCP-MCNC: 3.7 G/DL (ref 3.5–5.2)
ALP SERPL-CCNC: 127 U/L (ref 55–135)
ALT SERPL W/O P-5'-P-CCNC: 29 U/L (ref 10–44)
ANION GAP SERPL CALC-SCNC: 7 MMOL/L (ref 8–16)
AST SERPL-CCNC: 38 U/L (ref 10–40)
BASOPHILS # BLD AUTO: 0.02 K/UL (ref 0–0.2)
BASOPHILS NFR BLD: 0.3 % (ref 0–1.9)
BILIRUB SERPL-MCNC: 0.7 MG/DL (ref 0.1–1)
BNP SERPL-MCNC: 63 PG/ML (ref 0–99)
BUN SERPL-MCNC: 10 MG/DL (ref 8–23)
CALCIUM SERPL-MCNC: 9 MG/DL (ref 8.7–10.5)
CHLORIDE SERPL-SCNC: 102 MMOL/L (ref 95–110)
CO2 SERPL-SCNC: 29 MMOL/L (ref 23–29)
CREAT SERPL-MCNC: 0.6 MG/DL (ref 0.5–1.4)
DIFFERENTIAL METHOD BLD: ABNORMAL
EOSINOPHIL # BLD AUTO: 0.2 K/UL (ref 0–0.5)
EOSINOPHIL NFR BLD: 1.9 % (ref 0–8)
ERYTHROCYTE [DISTWIDTH] IN BLOOD BY AUTOMATED COUNT: 13.3 % (ref 11.5–14.5)
EST. GFR  (NO RACE VARIABLE): >60 ML/MIN/1.73 M^2
GLUCOSE SERPL-MCNC: 101 MG/DL (ref 70–110)
HCT VFR BLD AUTO: 39.9 % (ref 37–48.5)
HGB BLD-MCNC: 12.4 G/DL (ref 12–16)
IMM GRANULOCYTES # BLD AUTO: 0.03 K/UL (ref 0–0.04)
IMM GRANULOCYTES NFR BLD AUTO: 0.4 % (ref 0–0.5)
INFLUENZA A, MOLECULAR: NEGATIVE
INFLUENZA B, MOLECULAR: NEGATIVE
LYMPHOCYTES # BLD AUTO: 1 K/UL (ref 1–4.8)
LYMPHOCYTES NFR BLD: 12.5 % (ref 18–48)
MCH RBC QN AUTO: 30.8 PG (ref 27–31)
MCHC RBC AUTO-ENTMCNC: 31.1 G/DL (ref 32–36)
MCV RBC AUTO: 99 FL (ref 82–98)
MONOCYTES # BLD AUTO: 0.7 K/UL (ref 0.3–1)
MONOCYTES NFR BLD: 8.8 % (ref 4–15)
NEUTROPHILS # BLD AUTO: 6 K/UL (ref 1.8–7.7)
NEUTROPHILS NFR BLD: 76.1 % (ref 38–73)
NRBC BLD-RTO: 0 /100 WBC
PLATELET # BLD AUTO: 221 K/UL (ref 150–450)
PMV BLD AUTO: 9.4 FL (ref 9.2–12.9)
POTASSIUM SERPL-SCNC: 3.8 MMOL/L (ref 3.5–5.1)
PROT SERPL-MCNC: 7.4 G/DL (ref 6–8.4)
RBC # BLD AUTO: 4.03 M/UL (ref 4–5.4)
SARS-COV-2 RDRP RESP QL NAA+PROBE: NEGATIVE
SODIUM SERPL-SCNC: 138 MMOL/L (ref 136–145)
SPECIMEN SOURCE: NORMAL
TROPONIN I SERPL HS-MCNC: 6.5 PG/ML (ref 0–14.9)
WBC # BLD AUTO: 7.87 K/UL (ref 3.9–12.7)

## 2024-11-15 PROCEDURE — 93005 ELECTROCARDIOGRAM TRACING: CPT | Performed by: GENERAL PRACTICE

## 2024-11-15 PROCEDURE — 84484 ASSAY OF TROPONIN QUANT: CPT | Performed by: NURSE PRACTITIONER

## 2024-11-15 PROCEDURE — 85025 COMPLETE CBC W/AUTO DIFF WBC: CPT | Performed by: NURSE PRACTITIONER

## 2024-11-15 PROCEDURE — 80053 COMPREHEN METABOLIC PANEL: CPT | Performed by: NURSE PRACTITIONER

## 2024-11-15 PROCEDURE — 93010 ELECTROCARDIOGRAM REPORT: CPT | Mod: ,,, | Performed by: GENERAL PRACTICE

## 2024-11-15 PROCEDURE — 25500020 PHARM REV CODE 255: Performed by: EMERGENCY MEDICINE

## 2024-11-15 PROCEDURE — 25000003 PHARM REV CODE 250: Performed by: EMERGENCY MEDICINE

## 2024-11-15 PROCEDURE — 87635 SARS-COV-2 COVID-19 AMP PRB: CPT | Performed by: NURSE PRACTITIONER

## 2024-11-15 PROCEDURE — 87502 INFLUENZA DNA AMP PROBE: CPT | Performed by: NURSE PRACTITIONER

## 2024-11-15 PROCEDURE — 83880 ASSAY OF NATRIURETIC PEPTIDE: CPT | Performed by: NURSE PRACTITIONER

## 2024-11-15 PROCEDURE — 99285 EMERGENCY DEPT VISIT HI MDM: CPT | Mod: 25

## 2024-11-15 RX ORDER — METRONIDAZOLE 500 MG/1
500 TABLET ORAL 2 TIMES DAILY
Qty: 19 TABLET | Refills: 0 | Status: SHIPPED | OUTPATIENT
Start: 2024-11-16 | End: 2024-11-26

## 2024-11-15 RX ORDER — ACETAMINOPHEN 500 MG
1000 TABLET ORAL
Status: COMPLETED | OUTPATIENT
Start: 2024-11-15 | End: 2024-11-15

## 2024-11-15 RX ORDER — LEVOFLOXACIN 750 MG/1
750 TABLET ORAL DAILY
Qty: 4 TABLET | Refills: 0 | Status: SHIPPED | OUTPATIENT
Start: 2024-11-16 | End: 2024-11-20

## 2024-11-15 RX ORDER — LEVOFLOXACIN 750 MG/1
750 TABLET ORAL
Status: COMPLETED | OUTPATIENT
Start: 2024-11-15 | End: 2024-11-15

## 2024-11-15 RX ORDER — METRONIDAZOLE 250 MG/1
500 TABLET ORAL
Status: COMPLETED | OUTPATIENT
Start: 2024-11-15 | End: 2024-11-15

## 2024-11-15 RX ADMIN — METRONIDAZOLE 500 MG: 250 TABLET ORAL at 08:11

## 2024-11-15 RX ADMIN — ACETAMINOPHEN 1000 MG: 500 TABLET, FILM COATED ORAL at 08:11

## 2024-11-15 RX ADMIN — IOHEXOL 100 ML: 350 INJECTION, SOLUTION INTRAVENOUS at 07:11

## 2024-11-15 RX ADMIN — LEVOFLOXACIN 750 MG: 750 TABLET, FILM COATED ORAL at 08:11

## 2024-11-15 NOTE — FIRST PROVIDER EVALUATION
Emergency Department TeleTriage Encounter Note      CHIEF COMPLAINT    Chief Complaint   Patient presents with    Shortness of Breath    Abdominal Pain       VITAL SIGNS   Initial Vitals [11/15/24 1626]   BP Pulse Resp Temp SpO2   (!) 153/78 90 20 99.6 °F (37.6 °C) 95 %      MAP       --            ALLERGIES    Review of patient's allergies indicates:   Allergen Reactions    Bee sting [allergen ext-venom-honey bee] Shortness Of Breath    Pcn [penicillins] Hives    Vancomycin analogues Rash       PROVIDER TRIAGE NOTE  Verbal consent for the teletriage evaluation was given by the patient at the start of the evaluation.  All efforts will be made to maintain patient's privacy during the evaluation.      This is a teletriage evaluation of a 69 y.o. female presenting to the ED with c/o cough, SOB, and left rib pain that started yesterday. Limited physical exam via telehealth: The patient is awake, alert, answering questions appropriately and is not in respiratory distress.  As the Teletriage provider, I performed an initial assessment and ordered appropriate labs and imaging studies, if any, to facilitate the patient's care once placed in the ED. Once a room is available, care and a full evaluation will be completed by an alternate ED provider.  Any additional orders and the final disposition will be determined by that provider.  All imaging and labs will not be followed-up by the Teletriage Team, including myself.          ORDERS  Labs Reviewed   CBC W/ AUTO DIFFERENTIAL   COMPREHENSIVE METABOLIC PANEL   SARS-COV-2 RNA AMPLIFICATION, QUAL   B-TYPE NATRIURETIC PEPTIDE   TROPONIN I HIGH SENSITIVITY   SARS-COV-2 RNA AMPLIFICATION, QUAL   INFLUENZA A AND B ANTIGEN       ED Orders (720h ago, onward)      Start Ordered     Status Ordering Provider    11/15/24 1634 11/15/24 1634  CBC Auto Differential  STAT         Ordered RONIT SALAS    11/15/24 1634 11/15/24 1634  Comprehensive Metabolic Panel  STAT         Ordered MARITZA  RONIT FLANAGAN    11/15/24 1634 11/15/24 1634  Pulse Oximetry Continuous  Continuous         Ordered RONIT SALAS    11/15/24 1634 11/15/24 1634  Cardiac Monitoring - Adult  Continuous         Ordered RONIT SALAS    11/15/24 1634 11/15/24 1634  EKG 12-lead  Once         Ordered RONIT SALAS    11/15/24 1634 11/15/24 1634  COVID-19 Rapid Screening  STAT         Ordered RONIT SALAS    11/15/24 1634 11/15/24 1634  Brain Natriuretic Peptide  STAT         Ordered RONIT SALAS    11/15/24 1634 11/15/24 1634  Troponin I High Sensitivity  STAT         Ordered RONIT SALAS    11/15/24 1634 11/15/24 1634  COVID-19 Rapid Screening  STAT         Ordered RONIT SALAS    11/15/24 1634 11/15/24 1634  Influenza antigen Nasopharyngeal Swab  Once         Ordered RONIT SALAS    11/15/24 1634 11/15/24 1634  X-Ray Chest AP Portable  1 time imaging         Ordered RONIT SALAS    11/15/24 1633 11/15/24 1634  Weigh patient  Once         Ordered RONIT SALAS              Virtual Visit Note: The provider triage portion of this emergency department evaluation and documentation was performed via gokit, a HIPAA-compliant telemedicine application, in concert with a tele-presenter in the room. A face to face patient evaluation with one of my colleagues will occur once the patient is placed in an emergency department room.      DISCLAIMER: This note was prepared with [employer] voice recognition transcription software. Garbled syntax, mangled pronouns, and other bizarre constructions may be attributed to that software system.

## 2024-11-15 NOTE — ED PROVIDER NOTES
Chief complaint:  Shortness of Breath and Abdominal Pain      HPI:  Megha Robb is a 69 y.o. female with hx htn, anemia, and prior appendex, cholex, gastric bypass presenting with multiple complaints of two days of nonproductive cough accompanied by one-week of left lower quadrant abdominal pain. Patient admitted 3/2024 with hypoxia and abnormal CXR treated as multifocal pna, d/c on home O2 at that time.  Patient states he no longer needs oxygen and has only mild shortness of breath when repeatedly coughing otherwise unrelated to position or exertion and absent at rest when not coughing.  She denies hemoptysis or production of sputum.  She denies measured fever.  Cough has been present for two days.  She denies travel or known sick contacts.  She had not followed up with pulmonology after last hospitalization with abnormal chest x-ray.  She denies associated hemoptysis, chest pain, pleuritic pain.  She does endorse one-week of nonradiating, non migrating, mild left lower quadrant abdominal pain.  She reports initially decreased bowel movements now normalized without diarrhea or blood in the stools.  No urinary complaints such as urinary frequency, urgency, dysuria, hematuria.  No associated fever.    ROS: As per HPI and below:  No back pain, new swelling, rash, vomiting, diarrhea.    Review of patient's allergies indicates:   Allergen Reactions    Bee sting [allergen ext-venom-honey bee] Shortness Of Breath    Pcn [penicillins] Hives    Vancomycin analogues Rash       Discharge Medication List as of 11/15/2024  8:26 PM        START taking these medications    Details   levoFLOXacin (LEVAQUIN) 750 MG tablet Take 1 tablet (750 mg total) by mouth once daily. for 4 days, Starting Sat 11/16/2024, Until Wed 11/20/2024, Normal      metroNIDAZOLE (FLAGYL) 500 MG tablet Take 1 tablet (500 mg total) by mouth 2 (two) times daily. for 10 days, Starting Sat 11/16/2024, Until Tue 11/26/2024, Normal           CONTINUE these  medications which have NOT CHANGED    Details   albuterol (VENTOLIN HFA) 90 mcg/actuation inhaler Inhale 2 puffs into the lungs every 6 (six) hours as needed for Wheezing. Rescue, Starting Mon 4/1/2024, Until Fri 5/3/2024 at 2359, Normal      alprazolam (XANAX) 0.25 MG tablet Take 0.25 mg by mouth 2 (two) times daily as needed. pateint unsure of dosage, Until Discontinued, Historical Med      amitriptyline (ELAVIL) 25 MG tablet Take 50 mg by mouth every evening., Starting Mon 3/25/2024, Historical Med      busPIRone (BUSPAR) 10 MG tablet Take 10 mg by mouth 2 (two) times daily., Historical Med      CARAFATE 1 gram tablet Take 1 g by mouth 4 (four) times daily before meals and nightly., Historical Med      carisoprodol (SOMA) 350 MG tablet Take 350 mg by mouth 4 (four) times daily as needed. Patient unsure of mg or dosage , Until Discontinued, Historical Med      ferrous sulfate 325 (65 FE) MG EC tablet Take 325 mg by mouth once daily. , Until Discontinued, Historical Med      FLUVIRIN [id number] 45 mcg (15 mcg x 3)/0.5 mL Susp Starting 10/29/2015, Until Discontinued, Historical Med      HYDROcodone-acetaminophen (NORCO)  mg per tablet Take 1 tablet by mouth 4 (four) times daily as needed for Pain., Historical Med      hydrOXYzine pamoate (VISTARIL) 50 MG Cap Take 50 mg by mouth once daily., Starting Wed 1/20/2016, Historical Med      LYRICA 200 mg Cap Take 200 mg by mouth 3 (three) times daily., Historical Med      multivitamin (THERAGRAN) per tablet Take 1 tablet by mouth once daily.  , Historical Med      pantoprazole (PROTONIX) 40 MG tablet Take 1 tablet (40 mg total) by mouth once daily., Starting Fri 7/21/2023, Until Sat 7/20/2024, Normal             PMH:  As per HPI and below:  Past Medical History:   Diagnosis Date    Anemia     Cancer     female    Diverticulosis     Hypertension     Osteoarthritis of midfoot 09/11/2017     Past Surgical History:   Procedure Laterality Date    APPENDECTOMY      breast  reduction      CHOLECYSTECTOMY      COLONOSCOPY N/A 7/28/2023    Procedure: COLONOSCOPY;  Surgeon: Bryson Srinivasan MD;  Location: [facility];  Service: Endoscopy;  Laterality: N/A;    ESOPHAGOGASTRODUODENOSCOPY N/A 8/18/2023    Procedure: EGD (ESOPHAGOGASTRODUODENOSCOPY);  Surgeon: Bryson Srinivasan MD;  Location: [facility];  Service: Endoscopy;  Laterality: N/A;    EYE SURGERY Bilateral     PHACO    GALLBLADDER SURGERY      GASTRIC RESTRICTION SURGERY      HYSTERECTOMY      JOINT REPLACEMENT      left knee replacement      april 2017    LIPOSUCTION      to legs x3    TONSILLECTOMY      TOTAL REDUCTION MAMMOPLASTY      tummy tuck      VARICOSE VEIN SURGERY      WRIST ARTHROPLASTY         Social History     Socioeconomic History    Marital status: Single   Tobacco Use    Smoking status: Never    Smokeless tobacco: Never   Substance and Sexual Activity    Alcohol use: Yes     Alcohol/week: 2.0 standard drinks of alcohol     Types: 1 Cans of beer, 1 Shots of liquor per week     Comment: weekends    Drug use: No       Family History   Problem Relation Name Age of Onset    Heart attack Mother      Heart attack Father      Stomach cancer Other      Rectal cancer Other      Liver cancer Other      Esophageal cancer Other      Crohn's disease Other      Colon polyps Other      Colon cancer Other         Physical Exam:    Vitals:    11/15/24 2030   BP: 134/65   Pulse: 102   Resp: 20   Temp:      GENERAL:  No apparent distress.  Alert.    HEENT:  Moist mucous membranes.  Normocephalic and atraumatic.    NECK:  No swelling.  Midline trachea.   CARDIOVASCULAR:  Regular rate and rhythm.  2+ radial pulses.  No murmur auscultated.  PULMONARY:  Occasional scattered rhonchi and slight expiratory wheeze.  No tachypnea or accessory muscle use.  No rales.  ABDOMEN:  Large body habitus with minimal left lower quadrant tenderness to palpation.  No voluntary or involuntary guarding, distention, palpable hernia or mass.  EXTREMITIES:   Warm and well perfused.  Brisk capillary refill.  Trace pitting pedal edema.  Legs are symmetric and nontender to palpation.  NEUROLOGICAL:  Normal mental status.  Appropriate and conversant.    SKIN:  No rashes or ecchymoses.    BACK:  Atraumatic.  No CVA tenderness to palpation.      Labs Reviewed   CBC W/ AUTO DIFFERENTIAL - Abnormal       Result Value    WBC 7.87      RBC 4.03      Hemoglobin 12.4      Hematocrit 39.9      MCV 99 (*)     MCH 30.8      MCHC 31.1 (*)     RDW 13.3      Platelets 221      MPV 9.4      Immature Granulocytes 0.4      Gran # (ANC) 6.0      Immature Grans (Abs) 0.03      Lymph # 1.0      Mono # 0.7      Eos # 0.2      Baso # 0.02      nRBC 0      Gran % 76.1 (*)     Lymph % 12.5 (*)     Mono % 8.8      Eosinophil % 1.9      Basophil % 0.3      Differential Method Automated     COMPREHENSIVE METABOLIC PANEL - Abnormal    Sodium 138      Potassium 3.8      Chloride 102      CO2 29      Glucose 101      BUN 10      Creatinine 0.6      Calcium 9.0      Total Protein 7.4      Albumin 3.7      Total Bilirubin 0.7      Alkaline Phosphatase 127      AST 38      ALT 29      eGFR >60.0      Anion Gap 7 (*)    SARS-COV-2 RNA AMPLIFICATION, QUAL    SARS-CoV-2 RNA, Amplification, Qual Negative     B-TYPE NATRIURETIC PEPTIDE    BNP 63     TROPONIN I HIGH SENSITIVITY    Troponin I High Sensitivity 6.5     INFLUENZA A AND B ANTIGEN    Influenza A, Molecular Negative      Influenza B, Molecular Negative      Flu A & B Source Nasal swab      Narrative:     Specimen Source->Nasopharyngeal Swab       Discharge Medication List as of 11/15/2024  8:26 PM        START taking these medications    Details   levoFLOXacin (LEVAQUIN) 750 MG tablet Take 1 tablet (750 mg total) by mouth once daily. for 4 days, Starting Sat 11/16/2024, Until Wed 11/20/2024, Normal      metroNIDAZOLE (FLAGYL) 500 MG tablet Take 1 tablet (500 mg total) by mouth 2 (two) times daily. for 10 days, Starting Sat 11/16/2024, Until Tue  11/26/2024, Normal           CONTINUE these medications which have NOT CHANGED    Details   albuterol (VENTOLIN HFA) 90 mcg/actuation inhaler Inhale 2 puffs into the lungs every 6 (six) hours as needed for Wheezing. Rescue, Starting Mon 4/1/2024, Until Fri 5/3/2024 at 2359, Normal      alprazolam (XANAX) 0.25 MG tablet Take 0.25 mg by mouth 2 (two) times daily as needed. pateint unsure of dosage, Until Discontinued, Historical Med      amitriptyline (ELAVIL) 25 MG tablet Take 50 mg by mouth every evening., Starting Mon 3/25/2024, Historical Med      busPIRone (BUSPAR) 10 MG tablet Take 10 mg by mouth 2 (two) times daily., Historical Med      CARAFATE 1 gram tablet Take 1 g by mouth 4 (four) times daily before meals and nightly., Historical Med      carisoprodol (SOMA) 350 MG tablet Take 350 mg by mouth 4 (four) times daily as needed. Patient unsure of mg or dosage , Until Discontinued, Historical Med      ferrous sulfate 325 (65 FE) MG EC tablet Take 325 mg by mouth once daily. , Until Discontinued, Historical Med      FLUVIRIN [id number] 45 mcg (15 mcg x 3)/0.5 mL Susp Starting 10/29/2015, Until Discontinued, Historical Med      HYDROcodone-acetaminophen (NORCO)  mg per tablet Take 1 tablet by mouth 4 (four) times daily as needed for Pain., Historical Med      hydrOXYzine pamoate (VISTARIL) 50 MG Cap Take 50 mg by mouth once daily., Starting Wed 1/20/2016, Historical Med      LYRICA 200 mg Cap Take 200 mg by mouth 3 (three) times daily., Historical Med      multivitamin (THERAGRAN) per tablet Take 1 tablet by mouth once daily.  , Historical Med      pantoprazole (PROTONIX) 40 MG tablet Take 1 tablet (40 mg total) by mouth once daily., Starting Fri 7/21/2023, Until Sat 7/20/2024, Normal             Orders Placed This Encounter   Procedures    X-Ray Chest AP Portable    CT Abdomen Pelvis With IV Contrast Oral Contrast for GI Bypass    CBC Auto Differential    Comprehensive Metabolic Panel    COVID-19 Rapid  Screening    Brain Natriuretic Peptide    Troponin I High Sensitivity    Influenza antigen Nasopharyngeal Swab    Weigh patient    Cardiac Monitoring - Adult    Pulse Oximetry Continuous    EKG 12-lead       Imaging Results              CT Abdomen Pelvis With IV Contrast Oral Contrast for GI Bypass (Final result)  Result time 11/15/24 20:05:37      Final result by Choco Chavarria MD (11/15/24 20:05:37)                   Impression:      Abnormal segment of sigmoid colon demonstrating wall thickening and pericolonic inflammatory change in a region of diverticula suggesting acute diverticulitis.  Consider colonoscopy follow-up clinically appropriate if not recently performed.    Remote postoperative change of the stomach with associated moderate sized hiatal hernia.    Cholecystectomy with stable intra and extrahepatic biliary ductal dilatation.    Additional findings as above.      Electronically signed by: Choco Chavarria MD  Date:    11/15/2024  Time:    20:05               Narrative:    EXAMINATION:  CT ABDOMEN PELVIS WITH IV CONTRAST    CLINICAL HISTORY:  LLQ abdominal pain;    TECHNIQUE:  Low dose axial images, sagittal and coronal reformations were obtained from the lung bases to the pubic symphysis following the IV administration of 100 mL of Omnipaque 350 .  Oral contrast was not given.    COMPARISON:  CT chest 03/25/2024, CT abdomen pelvis 06/03/2023    FINDINGS:  The visualized lung bases demonstrate interstitial opacities as well as mild patchy airspace opacities although overall improved compared to prior chest CT of 03/25/2024.  Findings may reflect interstitial lung disease.  Clinical correlation is advised.  No significant pleural fluid present.  There is mild nonspecific mediastinal lymph node enlargement.  There is no significant pericardial effusion.    The liver is normal in size.  No focal hepatic abnormality identified.  Gallbladder is surgically absent.  There is mild chronic intra and  extrahepatic biliary ductal dilatation present.    There is a moderate-sized hiatal hernia.  There is postoperative change of the stomach.  The spleen and adrenal glands appear within normal limits.  There is fatty involutional change of the pancreas.    The kidneys are normal in size and location and enhance symmetrically.  There is a left renal cyst.  There is no hydronephrosis.  Urinary bladder appears within normal limits.  Uterus is surgically absent.    The abdominal aorta is normal in course and caliber mild atherosclerotic calcification.  There is no retroperitoneal hematoma.    There is no evidence of small-bowel obstruction.  There is colonic diverticulosis.  There is an abnormal segment of sigmoid colon demonstrating wall thickening and pericolonic inflammatory change in a region of diverticula suggesting acute diverticulitis.  No evidence of abscess or free intraperitoneal air.  The appendix is not definitively visualized, however no localized inflammatory change is seen at its expected location.  There is no ascites present.  There are remote postoperative changes of the anterior abdominal wall.  There are scattered shotty small mesenteric and retroperitoneal lymph nodes.    There are remote postoperative changes of the lumbosacral spine.  There are degenerative change of the remaining visualized thoracolumbar spine.  The extraperitoneal soft tissues are unremarkable.                                       X-Ray Chest AP Portable (Final result)  Result time 11/15/24 17:42:01      Final result by Choco Chavarria MD (11/15/24 17:42:01)                   Impression:      Abnormal chest radiograph as above.      Electronically signed by: Choco Chavarria MD  Date:    11/15/2024  Time:    17:42               Narrative:    EXAMINATION:  XR CHEST AP PORTABLE    CLINICAL HISTORY:  Shortness of breath    TECHNIQUE:  Single frontal view of the chest was  performed.    COMPARISON:  03/31/2024    FINDINGS:  Cardiac monitoring leads overlie the chest.  Cardiac silhouette is stable in size and configuration.  There is aortic atherosclerosis.  Lungs demonstrate diffuse coarse interstitial attenuation and scattered bilateral subsegmental opacities.  This appears similar to prior study of 03/31/2024.  Findings are nonspecific and may reflect multifocal infection, edema, and/or chronic interstitial lung disease.  No significant volume of pleural fluid or pneumothorax appreciated.  Osseous structures demonstrate degenerative changes.                                      ED Course as of 11/15/24 2052   Fri Nov 15, 2024   2749 EKG:  Normal sinus rhythm at a rate of 92.  Normal intervals.  Normal axis.  No significant ST or T wave changes suggesting acute ischemia or infarction.  No significant change compared to prior.  (Independently interpreted by me)   [MR]   2026 Black box warning for fluoroquinolones issued by the FDA discussed in detail with patient prior to initiation.  Potential complications discussed include but are not limited to mental status changes including psychosis, hypoglycemia particularly in those on antihyperglycemic medical treatment, tendinitis or tendon rupture, and prolongation of the QT interval predisposing to cardiac arrhythmias and possibly sudden cardiac death.  Patient is able to repeat these back to me and understands with discussion of risks, benefits, and alternatives.   [MR]      ED Course User Index  [MR] Choco Simeon MD       MDM:    69 y.o. female with two days of cough absent significant dyspnea or hypoxia with chronically abnormal x-ray accompanied by separate complaint of one-week of left lower quadrant abdominal pain.  There is minimal tenderness on exam with CT ordered due to previous abdominal surgery history to exclude life-threatening process such as diverticulitis, abscess, obstruction, perforated viscus with low  suspicion.  No urinary complaints and I doubt UTI.  It is difficult to exclude bacterial pneumonia although viral process is certainly possible.  I do question chronic process given chest x-ray today appears very similar to previous eight months ago and these findings may not be new.  There is no definitive new infiltrate seen although limited secondary to baseline abnormalities.  I have very low suspicion for PE.  I doubt CHF.  I doubt ACS.  I doubt sepsis.  Patient continues to do well with antibiotics prescribed in case of pneumonia although chest x-ray chronically abnormal but stable.  IM once again recommending follow up with pulmonology as necessary she has some underlying interstitial disease.  Portions of lungs on CT actually look somewhat improved from previous but with chronic abnormalities.  There is question of diverticulitis with antibiotics also geared to cover for this.  There is no complication such as abscess.  She is appropriate for follow up with PCP.  I do not think she would benefit from observation in the hospital.  Detailed return precautions reviewed.    Diagnoses:    1. Cough  2. LLQ abdominal pain       Choco Simeon MD  11/15/24 2052

## 2024-11-16 LAB
OHS QRS DURATION: 98 MS
OHS QTC CALCULATION: 432 MS

## 2024-11-27 ENCOUNTER — OFFICE VISIT (OUTPATIENT)
Dept: FAMILY MEDICINE | Facility: CLINIC | Age: 69
End: 2024-11-27
Payer: MEDICARE

## 2024-11-27 ENCOUNTER — HOSPITAL ENCOUNTER (OUTPATIENT)
Dept: RADIOLOGY | Facility: CLINIC | Age: 69
Discharge: HOME | End: 2024-11-27
Payer: MEDICARE

## 2024-11-27 VITALS
RESPIRATION RATE: 18 BRPM | HEIGHT: 62 IN | BODY MASS INDEX: 45.19 KG/M2 | WEIGHT: 245.56 LBS | DIASTOLIC BLOOD PRESSURE: 82 MMHG | OXYGEN SATURATION: 94 % | HEART RATE: 85 BPM | SYSTOLIC BLOOD PRESSURE: 136 MMHG

## 2024-11-27 DIAGNOSIS — R05.3 PERSISTENT COUGH FOR 3 WEEKS OR LONGER: ICD-10-CM

## 2024-11-27 DIAGNOSIS — M19.91 PRIMARY OSTEOARTHRITIS, UNSPECIFIED SITE: ICD-10-CM

## 2024-11-27 DIAGNOSIS — I73.9 PERIPHERAL VASCULAR DISEASE, UNSPECIFIED: ICD-10-CM

## 2024-11-27 DIAGNOSIS — I10 HYPERTENSION, UNSPECIFIED TYPE: ICD-10-CM

## 2024-11-27 DIAGNOSIS — F32.0 MAJOR DEPRESSIVE DISORDER, SINGLE EPISODE, MILD: ICD-10-CM

## 2024-11-27 DIAGNOSIS — R05.3 PERSISTENT COUGH FOR 3 WEEKS OR LONGER: Primary | ICD-10-CM

## 2024-11-27 DIAGNOSIS — E66.01 MORBID OBESITY WITH BMI OF 40.0-44.9, ADULT: ICD-10-CM

## 2024-11-27 DIAGNOSIS — R06.2 WHEEZING: ICD-10-CM

## 2024-11-27 PROCEDURE — 3075F SYST BP GE 130 - 139MM HG: CPT | Mod: CPTII,S$GLB,,

## 2024-11-27 PROCEDURE — 1101F PT FALLS ASSESS-DOCD LE1/YR: CPT | Mod: CPTII,S$GLB,,

## 2024-11-27 PROCEDURE — 99999 PR PBB SHADOW E&M-EST. PATIENT-LVL V: CPT | Mod: PBBFAC,,,

## 2024-11-27 PROCEDURE — 3079F DIAST BP 80-89 MM HG: CPT | Mod: CPTII,S$GLB,,

## 2024-11-27 PROCEDURE — 71046 X-RAY EXAM CHEST 2 VIEWS: CPT | Mod: TC,FY,PO

## 2024-11-27 PROCEDURE — 99204 OFFICE O/P NEW MOD 45 MIN: CPT | Mod: S$GLB,,,

## 2024-11-27 PROCEDURE — 3288F FALL RISK ASSESSMENT DOCD: CPT | Mod: CPTII,S$GLB,,

## 2024-11-27 PROCEDURE — 1125F AMNT PAIN NOTED PAIN PRSNT: CPT | Mod: CPTII,S$GLB,,

## 2024-11-27 PROCEDURE — 1160F RVW MEDS BY RX/DR IN RCRD: CPT | Mod: CPTII,S$GLB,,

## 2024-11-27 PROCEDURE — 3008F BODY MASS INDEX DOCD: CPT | Mod: CPTII,S$GLB,,

## 2024-11-27 PROCEDURE — 71046 X-RAY EXAM CHEST 2 VIEWS: CPT | Mod: 26,,, | Performed by: STUDENT IN AN ORGANIZED HEALTH CARE EDUCATION/TRAINING PROGRAM

## 2024-11-27 PROCEDURE — 1159F MED LIST DOCD IN RCRD: CPT | Mod: CPTII,S$GLB,,

## 2024-11-27 RX ORDER — METHYLPREDNISOLONE 4 MG/1
TABLET ORAL
Qty: 21 EACH | Refills: 0 | Status: SHIPPED | OUTPATIENT
Start: 2024-11-27 | End: 2024-12-18

## 2024-11-27 RX ORDER — ALBUTEROL SULFATE 90 UG/1
2 INHALANT RESPIRATORY (INHALATION) EVERY 6 HOURS PRN
Qty: 8.5 G | Refills: 2 | Status: SHIPPED | OUTPATIENT
Start: 2024-11-27 | End: 2024-12-27

## 2024-11-27 RX ORDER — PROMETHAZINE HYDROCHLORIDE AND DEXTROMETHORPHAN HYDROBROMIDE 6.25; 15 MG/5ML; MG/5ML
5 SYRUP ORAL EVERY 6 HOURS PRN
Qty: 118 ML | Refills: 0 | Status: SHIPPED | OUTPATIENT
Start: 2024-11-27 | End: 2024-12-07

## 2024-11-27 RX ORDER — BENZONATATE 100 MG/1
100 CAPSULE ORAL 3 TIMES DAILY PRN
Qty: 30 CAPSULE | Refills: 0 | Status: SHIPPED | OUTPATIENT
Start: 2024-11-27 | End: 2024-12-07

## 2024-11-27 RX ORDER — DICLOFENAC SODIUM 10 MG/G
2 GEL TOPICAL 2 TIMES DAILY PRN
Qty: 200 G | Refills: 2 | Status: SHIPPED | OUTPATIENT
Start: 2024-11-27

## 2024-11-27 NOTE — PROGRESS NOTES
"Subjective:       Patient ID: Megha Robb is a 69 y.o. female.    Chief Complaint: Cough    Cough        History of Present Illness    CHIEF COMPLAINT:  Patient presents today with a persistent cough and wheezing.    RESPIRATORY SYMPTOMS:  She reports persistent cough and wheezing, particularly at night, disrupting her sleep. She experiences difficulty catching her breath and describes a whistling sound associated with the wheezing. She expresses frustration with the ongoing cough. She denies any history of asthma or COPD. Recently, she visited the emergency room where she received medication for her symptoms. She reports a previous diagnosis of multifocal pneumonia, with a chest XR showing diffuse interstitial attenuation. She denies any history of smoking.    MEDICATIONS:  She is currently using an Albuterol inhaler. She was previously prescribed Levofloxacin and Metronidazole for multifocal pneumonia. For arthritis pain, she uses Voltaren gel, which she applies frequently. She expresses a desire for a larger quantity of the medication due to high usage.    MUSCULOSKELETAL:  She reports significant knee pain and arthritis symptoms. She describes her knees as making audible "snap, crackle, pop" sounds.    SURGICAL HISTORY:  She underwent breast surgery in February.      ROS:  Respiratory: positive cough, positive wheezing  Musculoskeletal: positive joint pain  Psychiatric: positive sleep difficulty          Past Medical History:   Diagnosis Date    Anemia     Cancer     female    Diverticulosis     Hypertension     Osteoarthritis of midfoot 09/11/2017       Review of patient's allergies indicates:   Allergen Reactions    Bee sting [allergen ext-venom-honey bee] Shortness Of Breath    Pcn [penicillins] Hives    Vancomycin analogues Rash         Current Outpatient Medications:     amitriptyline (ELAVIL) 25 MG tablet, Take 50 mg by mouth every evening., Disp: , Rfl:     busPIRone (BUSPAR) 10 MG tablet, Take 10 mg " by mouth 2 (two) times daily., Disp: , Rfl:     CARAFATE 1 gram tablet, Take 1 g by mouth 4 (four) times daily before meals and nightly., Disp: , Rfl:     ferrous sulfate 325 (65 FE) MG EC tablet, Take 325 mg by mouth once daily. , Disp: , Rfl:     FLUVIRIN [id number] 45 mcg (15 mcg x 3)/0.5 mL Susp, , Disp: , Rfl: 0    HYDROcodone-acetaminophen (NORCO)  mg per tablet, Take 1 tablet by mouth 4 (four) times daily as needed for Pain., Disp: , Rfl:     hydrOXYzine pamoate (VISTARIL) 50 MG Cap, Take 50 mg by mouth once daily., Disp: , Rfl: 1    LYRICA 200 mg Cap, Take 200 mg by mouth 3 (three) times daily., Disp: , Rfl:     metroNIDAZOLE (FLAGYL) 500 MG tablet, Take 1 tablet (500 mg total) by mouth 2 (two) times daily. for 10 days, Disp: 19 tablet, Rfl: 0    multivitamin (THERAGRAN) per tablet, Take 1 tablet by mouth once daily.  , Disp: , Rfl:     pantoprazole (PROTONIX) 40 MG tablet, Take 1 tablet (40 mg total) by mouth once daily., Disp: 90 tablet, Rfl: 1    albuterol (VENTOLIN HFA) 90 mcg/actuation inhaler, Inhale 2 puffs into the lungs every 6 (six) hours as needed for Wheezing. Rescue, Disp: 8.5 g, Rfl: 2    alprazolam (XANAX) 0.25 MG tablet, Take 0.25 mg by mouth 2 (two) times daily as needed. pateint unsure of dosage (Patient not taking: Reported on 11/27/2024), Disp: , Rfl:     benzonatate (TESSALON) 100 MG capsule, Take 1 capsule (100 mg total) by mouth 3 (three) times daily as needed for Cough., Disp: 30 capsule, Rfl: 0    carisoprodol (SOMA) 350 MG tablet, Take 350 mg by mouth 4 (four) times daily as needed. Patient unsure of mg or dosage  (Patient not taking: Reported on 11/27/2024), Disp: , Rfl:     diclofenac sodium (VOLTAREN ARTHRITIS PAIN) 1 % Gel, Apply 2 g topically 2 (two) times daily as needed (arthritis pain)., Disp: 200 g, Rfl: 2    methylPREDNISolone (MEDROL DOSEPACK) 4 mg tablet, use as directed, Disp: 21 each, Rfl: 0    promethazine-dextromethorphan (PROMETHAZINE-DM) 6.25-15 mg/5 mL Syrp,  "Take 5 mLs by mouth every 6 (six) hours as needed (cough)., Disp: 118 mL, Rfl: 0    Review of Systems   Respiratory:  Positive for cough.        Objective:      /82 (BP Location: Right arm, Patient Position: Sitting)   Pulse 85   Resp 18   Ht 5' 2" (1.575 m)   Wt 111.4 kg (245 lb 9.5 oz)   SpO2 (!) 94%   BMI 44.92 kg/m²   Physical Exam  Vitals reviewed.   Constitutional:       General: She is not in acute distress.     Appearance: Normal appearance. She is obese. She is not ill-appearing, toxic-appearing or diaphoretic.   HENT:      Head: Normocephalic.      Right Ear: External ear normal.      Left Ear: External ear normal.      Nose: Nose normal. No congestion or rhinorrhea.      Mouth/Throat:      Mouth: Mucous membranes are moist.      Pharynx: Oropharynx is clear.   Eyes:      General: No scleral icterus.        Right eye: No discharge.         Left eye: No discharge.      Extraocular Movements: Extraocular movements intact.      Conjunctiva/sclera: Conjunctivae normal.   Cardiovascular:      Rate and Rhythm: Normal rate and regular rhythm.      Pulses: Normal pulses.      Heart sounds: Normal heart sounds. No murmur heard.     No friction rub. No gallop.   Pulmonary:      Effort: Pulmonary effort is normal. No respiratory distress.      Breath sounds: Decreased breath sounds present. No wheezing, rhonchi or rales.   Chest:      Chest wall: No tenderness.   Musculoskeletal:         General: Swelling and tenderness present. No deformity. Normal range of motion.      Cervical back: Normal range of motion.      Right lower leg: No edema.      Left lower leg: No edema.      Comments: Crepitus present R knee     Skin:     General: Skin is warm and dry.      Capillary Refill: Capillary refill takes less than 2 seconds.      Coloration: Skin is not jaundiced.      Findings: No bruising, erythema, lesion or rash.   Neurological:      Mental Status: She is alert and oriented to person, place, and time.      " Gait: Gait normal.   Psychiatric:         Mood and Affect: Mood normal.         Behavior: Behavior normal.         Thought Content: Thought content normal.         Judgment: Judgment normal.             Assessment:       1. Persistent cough for 3 weeks or longer    2. Wheezing    3. Major depressive disorder, single episode, mild    4. Peripheral vascular disease, unspecified    5. Primary osteoarthritis, unspecified site    6. Hypertension, unspecified type    7. Morbid obesity with BMI of 40.0-44.9, adult          Assessment & Plan    IMPRESSION:  - Evaluated patient for persistent cough and wheezing, considering history of multifocal pneumonia in March  - Assessed for possible asthma or COPD  - Reviewed previous chest XR from 2 weeks ago showing diffuse interstitial changes and bilateral opacities  - Considered progression of lung condition  - Evaluated for arthritis pain, particularly in knees    PLAN SUMMARY:  - Started oral steroid  - Prescribed Voltaren gel 200g for arthritis pain  - Ordered pulmonary function test  - Started albuterol inhaler  - Prescribed two cough medications  - Ordered chest XR  - Follow-up after pulmonary function test results    CHRONIC OBSTRUCTIVE PULMONARY DISEASE vs ILD: (?)  - Started albuterol inhaler.  - Started oral steroid.  - Chest XR ordered.  - Pulmonary function test ordered.    COUGH:  - Started 2 different cough medications.    POLYOSTEOARTHRITIS:  - Started Voltaren gel 200g for arthritis pain, to be applied daily as needed.    FOLLOW-UP:  - Follow up after pulmonary function test results are available.  - Contact the office for x-ray scheduling.          Plan:       Persistent cough for 3 weeks or longer  -     albuterol (VENTOLIN HFA) 90 mcg/actuation inhaler; Inhale 2 puffs into the lungs every 6 (six) hours as needed for Wheezing. Rescue  Dispense: 8.5 g; Refill: 2  -     promethazine-dextromethorphan (PROMETHAZINE-DM) 6.25-15 mg/5 mL Syrp; Take 5 mLs by mouth every 6  (six) hours as needed (cough).  Dispense: 118 mL; Refill: 0  -     X-Ray Chest PA And Lateral; Future; Expected date: 11/27/2024  -     benzonatate (TESSALON) 100 MG capsule; Take 1 capsule (100 mg total) by mouth 3 (three) times daily as needed for Cough.  Dispense: 30 capsule; Refill: 0  -     Complete PFT w/ bronchodilator; Future  -     methylPREDNISolone (MEDROL DOSEPACK) 4 mg tablet; use as directed  Dispense: 21 each; Refill: 0    Wheezing  -     albuterol (VENTOLIN HFA) 90 mcg/actuation inhaler; Inhale 2 puffs into the lungs every 6 (six) hours as needed for Wheezing. Rescue  Dispense: 8.5 g; Refill: 2  -     Complete PFT w/ bronchodilator; Future  -     methylPREDNISolone (MEDROL DOSEPACK) 4 mg tablet; use as directed  Dispense: 21 each; Refill: 0    Major depressive disorder, single episode, mild        -    Stable. Continue elavil, buspar. Will continue to monitor.     Peripheral vascular disease, unspecified        -    Stable. No concerning features at this time. Will continue to monitor.    Primary osteoarthritis, unspecified site  -     diclofenac sodium (VOLTAREN ARTHRITIS PAIN) 1 % Gel; Apply 2 g topically 2 (two) times daily as needed (arthritis pain).  Dispense: 200 g; Refill: 2    Hypertension, unspecified type        -    Stable. Not currently on Rx for this. Will continue to monitor.     Morbid obesity with BMI of 40.0-44.9, adult        -     Patient would benefit from healthy diet, exercise and weight loss. Overall health and wellbeing would likely improve.                     Jeet Ortega PA-C  Family Medicine Physician Assistant              I spent a total of 20 minutes on the day of the visit.This includes face to face time and non-face to face time preparing to see the patient (eg, review of tests), obtaining and/or reviewing separately obtained history, documenting clinical information in the electronic or other health record, independently interpreting results and communicating  results to the patient/family/caregiver, or care coordinator.      We have addressed [4] Moderate: 1 or more chronic illnesses with exacerbation, progression, or side effects of treatment / 2 or more stable chronic illnesses / 1 undiagnosed new problem with uncertain prognosis / 1 acute illness with systemic symptoms / 1 acute complicated injury  The complexity of the data reviewed and analyzed for this visit was [3] Limited (Reviewed prior external note, ordered unique testing or reviewed the results of each unique test)   The risk of complications and/or morbidity or mortality are [4] Moderate risk (I.e. prescription drug management / decision regarding minor surgery with identified pt or procedure risk factors / decision regarding elective major surgery without identified pt or procedure risk factors / diagnosis or treatment significantly limited by social determinants of health)   The level of Medical Decision Making for this visit is [4] Moderate     This note was generated with the assistance of ambient listening technology. Verbal consent was obtained by the patient and accompanying visitor(s) for the recording of patient appointment to facilitate this note. I attest to having reviewed and edited the generated note for accuracy, though some syntax or spelling errors may persist. Please contact the author of this note for any clarification.

## 2024-12-05 ENCOUNTER — HOSPITAL ENCOUNTER (EMERGENCY)
Facility: HOSPITAL | Age: 69
Discharge: HOME | End: 2024-12-05
Attending: EMERGENCY MEDICINE
Payer: MEDICARE

## 2024-12-05 VITALS
HEART RATE: 75 BPM | TEMPERATURE: 98 F | HEIGHT: 62 IN | WEIGHT: 245.56 LBS | RESPIRATION RATE: 17 BRPM | BODY MASS INDEX: 45.19 KG/M2 | OXYGEN SATURATION: 98 % | SYSTOLIC BLOOD PRESSURE: 103 MMHG | DIASTOLIC BLOOD PRESSURE: 66 MMHG

## 2024-12-05 DIAGNOSIS — M79.89 LEG SWELLING: ICD-10-CM

## 2024-12-05 DIAGNOSIS — L03.115 CELLULITIS OF RIGHT LEG: Primary | ICD-10-CM

## 2024-12-05 LAB
ALBUMIN SERPL BCP-MCNC: 3.4 G/DL (ref 3.5–5.2)
ALP SERPL-CCNC: 91 U/L (ref 55–135)
ALT SERPL W/O P-5'-P-CCNC: 12 U/L (ref 10–44)
ANION GAP SERPL CALC-SCNC: 4 MMOL/L (ref 8–16)
AST SERPL-CCNC: 26 U/L (ref 10–40)
BASOPHILS # BLD AUTO: 0.02 K/UL (ref 0–0.2)
BASOPHILS NFR BLD: 0.3 % (ref 0–1.9)
BILIRUB SERPL-MCNC: 0.3 MG/DL (ref 0.1–1)
BUN SERPL-MCNC: 11 MG/DL (ref 8–23)
CALCIUM SERPL-MCNC: 8.8 MG/DL (ref 8.7–10.5)
CHLORIDE SERPL-SCNC: 102 MMOL/L (ref 95–110)
CO2 SERPL-SCNC: 31 MMOL/L (ref 23–29)
CREAT SERPL-MCNC: 0.6 MG/DL (ref 0.5–1.4)
CRP SERPL-MCNC: 0.9 MG/DL
DIFFERENTIAL METHOD BLD: ABNORMAL
EOSINOPHIL # BLD AUTO: 0.2 K/UL (ref 0–0.5)
EOSINOPHIL NFR BLD: 2.8 % (ref 0–8)
ERYTHROCYTE [DISTWIDTH] IN BLOOD BY AUTOMATED COUNT: 14 % (ref 11.5–14.5)
ERYTHROCYTE [SEDIMENTATION RATE] IN BLOOD BY WESTERGREN METHOD: 52 MM/HR (ref 0–20)
EST. GFR  (NO RACE VARIABLE): >60 ML/MIN/1.73 M^2
GLUCOSE SERPL-MCNC: 78 MG/DL (ref 70–110)
HCT VFR BLD AUTO: 37.3 % (ref 37–48.5)
HCV AB SERPL QL IA: NEGATIVE
HGB BLD-MCNC: 12.3 G/DL (ref 12–16)
HIV 1+2 AB+HIV1 P24 AG SERPL QL IA: NEGATIVE
IMM GRANULOCYTES # BLD AUTO: 0.02 K/UL (ref 0–0.04)
IMM GRANULOCYTES NFR BLD AUTO: 0.3 % (ref 0–0.5)
LYMPHOCYTES # BLD AUTO: 1.5 K/UL (ref 1–4.8)
LYMPHOCYTES NFR BLD: 26 % (ref 18–48)
MCH RBC QN AUTO: 32.2 PG (ref 27–31)
MCHC RBC AUTO-ENTMCNC: 33 G/DL (ref 32–36)
MCV RBC AUTO: 98 FL (ref 82–98)
MONOCYTES # BLD AUTO: 0.6 K/UL (ref 0.3–1)
MONOCYTES NFR BLD: 9.5 % (ref 4–15)
NEUTROPHILS # BLD AUTO: 3.5 K/UL (ref 1.8–7.7)
NEUTROPHILS NFR BLD: 61.1 % (ref 38–73)
NRBC BLD-RTO: 0 /100 WBC
PLATELET # BLD AUTO: 246 K/UL (ref 150–450)
PMV BLD AUTO: 9.1 FL (ref 9.2–12.9)
POTASSIUM SERPL-SCNC: 3.9 MMOL/L (ref 3.5–5.1)
PROT SERPL-MCNC: 7.1 G/DL (ref 6–8.4)
RBC # BLD AUTO: 3.82 M/UL (ref 4–5.4)
SODIUM SERPL-SCNC: 137 MMOL/L (ref 136–145)
WBC # BLD AUTO: 5.76 K/UL (ref 3.9–12.7)

## 2024-12-05 PROCEDURE — 85651 RBC SED RATE NONAUTOMATED: CPT | Performed by: EMERGENCY MEDICINE

## 2024-12-05 PROCEDURE — 85025 COMPLETE CBC W/AUTO DIFF WBC: CPT | Performed by: EMERGENCY MEDICINE

## 2024-12-05 PROCEDURE — 80053 COMPREHEN METABOLIC PANEL: CPT | Performed by: EMERGENCY MEDICINE

## 2024-12-05 PROCEDURE — 96365 THER/PROPH/DIAG IV INF INIT: CPT

## 2024-12-05 PROCEDURE — 86803 HEPATITIS C AB TEST: CPT | Performed by: EMERGENCY MEDICINE

## 2024-12-05 PROCEDURE — 99285 EMERGENCY DEPT VISIT HI MDM: CPT | Mod: 25

## 2024-12-05 PROCEDURE — 86140 C-REACTIVE PROTEIN: CPT | Performed by: EMERGENCY MEDICINE

## 2024-12-05 PROCEDURE — 87389 HIV-1 AG W/HIV-1&-2 AB AG IA: CPT | Performed by: EMERGENCY MEDICINE

## 2024-12-05 PROCEDURE — 63600175 PHARM REV CODE 636 W HCPCS: Mod: JZ,JG | Performed by: EMERGENCY MEDICINE

## 2024-12-05 RX ORDER — DOXYCYCLINE 100 MG/1
100 CAPSULE ORAL 2 TIMES DAILY
Qty: 20 CAPSULE | Refills: 0 | Status: SHIPPED | OUTPATIENT
Start: 2024-12-05 | End: 2024-12-15

## 2024-12-05 RX ORDER — CLINDAMYCIN PHOSPHATE 900 MG/50ML
900 INJECTION, SOLUTION INTRAVENOUS
Status: COMPLETED | OUTPATIENT
Start: 2024-12-05 | End: 2024-12-05

## 2024-12-05 RX ADMIN — CLINDAMYCIN PHOSPHATE 900 MG: 900 INJECTION, SOLUTION INTRAVENOUS at 01:12

## 2024-12-05 NOTE — ED PROVIDER NOTES
Encounter Date: 12/5/2024       History     Chief Complaint   Patient presents with    Leg Pain     C/o rt leg pain, swelling     This is a 69-year-old female with history of anemia, diverticulosis, hypertension, osteoarthritis comes in complaining of right leg swelling.  Patient reports that symptoms started yesterday.  She noticed that her right leg was swollen and twice the size of her left leg.  She also noticed some warmth and erythema to it.  It has been painful.  She denies any trauma.  She denies any abrasions or injuries.  She denies any associated chest pain or shortness of breath.  No history of prolonged immobilization.      Review of patient's allergies indicates:   Allergen Reactions    Bee sting [allergen ext-venom-honey bee] Shortness Of Breath    Pcn [penicillins] Hives    Vancomycin analogues Rash     Past Medical History:   Diagnosis Date    Anemia     Cancer     female    Diverticulosis     Hypertension     Osteoarthritis of midfoot 09/11/2017     Past Surgical History:   Procedure Laterality Date    APPENDECTOMY      breast reduction      CHOLECYSTECTOMY      COLONOSCOPY N/A 7/28/2023    Procedure: COLONOSCOPY;  Surgeon: Bryson Srinivasan MD;  Location: [facility];  Service: Endoscopy;  Laterality: N/A;    ESOPHAGOGASTRODUODENOSCOPY N/A 8/18/2023    Procedure: EGD (ESOPHAGOGASTRODUODENOSCOPY);  Surgeon: Bryson Srinivasan MD;  Location: [facility];  Service: Endoscopy;  Laterality: N/A;    EYE SURGERY Bilateral     PHACO    GALLBLADDER SURGERY      GASTRIC RESTRICTION SURGERY      HYSTERECTOMY      JOINT REPLACEMENT      left knee replacement      april 2017    LIPOSUCTION      to legs x3    TONSILLECTOMY      TOTAL REDUCTION MAMMOPLASTY      tummy tuck      VARICOSE VEIN SURGERY      WRIST ARTHROPLASTY       Family History   Problem Relation Name Age of Onset    Heart attack Mother      Heart attack Father      Stomach cancer Other      Rectal cancer Other      Liver cancer Other      Esophageal  cancer Other      Crohn's disease Other      Colon polyps Other      Colon cancer Other       Social History     Tobacco Use    Smoking status: Never    Smokeless tobacco: Never   Substance Use Topics    Alcohol use: Yes     Alcohol/week: 2.0 standard drinks of alcohol     Types: 1 Cans of beer, 1 Shots of liquor per week     Comment: weekends    Drug use: No     Review of Systems   Constitutional:  Negative for chills and fever.   HENT:  Negative for congestion, sore throat and trouble swallowing.    Respiratory:  Negative for cough and shortness of breath.    Cardiovascular:  Negative for chest pain and palpitations.   Gastrointestinal:  Negative for abdominal pain, diarrhea, nausea and vomiting.   Genitourinary:  Negative for dysuria and flank pain.   Musculoskeletal:  Negative for back pain and neck pain.        Leg pain and swelling as per HPI.   Skin:         Skin redness as per HPI.   Neurological:  Negative for weakness, numbness and headaches.   Psychiatric/Behavioral:  Negative for agitation and confusion.    All other systems reviewed and are negative.      Physical Exam     Initial Vitals [12/05/24 1109]   BP Pulse Resp Temp SpO2   115/80 75 17 97.7 °F (36.5 °C) 97 %      MAP       --         Physical Exam    Nursing note and vitals reviewed.  Constitutional: Vital signs are normal. She appears well-developed and well-nourished.  Non-toxic appearance. No distress.   HENT:   Head: Normocephalic and atraumatic. Mouth/Throat: Oropharynx is clear and moist.   Eyes: Conjunctivae and EOM are normal. Pupils are equal, round, and reactive to light.   Neck: Neck supple.   Normal range of motion.  Cardiovascular:  Normal rate, regular rhythm and intact distal pulses.           Pulmonary/Chest: Breath sounds normal. She has no wheezes.   Abdominal: Abdomen is soft. Bowel sounds are normal. There is no abdominal tenderness.   Musculoskeletal:      Cervical back: Normal range of motion and neck supple. No rigidity.  No muscular tenderness.      Comments: Right greater than left leg swelling with multiple varicosities.  Warmth and erythema noted over the distal shin and calf area.  Mild tenderness to palpation.  2+ distal pulses.     Lymphadenopathy:     She has no cervical adenopathy.     She has no axillary adenopathy.   Neurological: She is alert and oriented to person, place, and time. She has normal strength. No cranial nerve deficit or sensory deficit. Gait normal.   Skin: Skin is warm, dry and intact.   Psychiatric: She has a normal mood and affect. Her behavior is normal.         ED Course   Procedures  Labs Reviewed   CBC W/ AUTO DIFFERENTIAL - Abnormal       Result Value    WBC 5.76      RBC 3.82 (*)     Hemoglobin 12.3      Hematocrit 37.3      MCV 98      MCH 32.2 (*)     MCHC 33.0      RDW 14.0      Platelets 246      MPV 9.1 (*)     Immature Granulocytes 0.3      Gran # (ANC) 3.5      Immature Grans (Abs) 0.02      Lymph # 1.5      Mono # 0.6      Eos # 0.2      Baso # 0.02      nRBC 0      Gran % 61.1      Lymph % 26.0      Mono % 9.5      Eosinophil % 2.8      Basophil % 0.3      Differential Method Automated      Narrative:     Release to patient->Immediate   COMPREHENSIVE METABOLIC PANEL - Abnormal    Sodium 137      Potassium 3.9      Chloride 102      CO2 31 (*)     Glucose 78      BUN 11      Creatinine 0.6      Calcium 8.8      Total Protein 7.1      Albumin 3.4 (*)     Total Bilirubin 0.3      Alkaline Phosphatase 91      AST 26      ALT 12      eGFR >60.0      Anion Gap 4 (*)     Narrative:     Release to patient->Immediate   SEDIMENTATION RATE - Abnormal    Sed Rate 52 (*)     Narrative:     Release to patient->Immediate   C-REACTIVE PROTEIN    CRP 0.90      Narrative:     Release to patient->Immediate   HEPATITIS C ANTIBODY   HIV 1 / 2 ANTIBODY          Imaging Results              US Lower Extremity Veins Right (Final result)  Result time 12/05/24 12:35:26      Final result by Jai Monreal DO  (12/05/24 12:35:26)                   Impression:      Negative for DVT throughout the right lower extremity veins.      Electronically signed by: Jai Monreal  Date:    12/05/2024  Time:    12:35               Narrative:    EXAMINATION:  US LOWER EXTREMITY VEINS RIGHT    CLINICAL HISTORY:  Other specified soft tissue disorders    COMPARISON:  None    FINDINGS:  Grayscale, color Doppler, and spectral Doppler analysis was performed of the right lower extremity veins.    Right common femoral, femoral, popliteal, and proximal great saphenous veins show normal compressibility, augmentation, and color Doppler flow.    Right posterior tibial, anterior tibial, and peroneal veins are unremarkable.                                       Medications   clindamycin in D5W 900 mg/50 mL IVPB 900 mg (0 mg Intravenous Stopped 12/5/24 1415)     Medical Decision Making  This is a 69-year-old female with history of anemia, diverticulosis, hypertension, osteoarthritis who comes in complaining of right greater than left leg swelling with warmth and erythema.  On examination patient's vitals are stable.  On physical exam she does have asymmetric swelling with multiple varicosities as well as warmth and erythema concerning for cellulitis.    Orders included CBC, CMP, ESR, CRP, DVT study of the leg was ordered.    Comorbidities contributing to patient's presentation include history of obesity, osteoarthritis and hypertension.  Acute exacerbation of chronic illness: None    I reviewed patient's external medical notes.  Patient was evaluated in family practice clinic a week ago for cough and wheezing.  Workup was unremarkable at that time.    Differential diagnosis includes cellulitis, DVT, thrombophlebitis, dependent edema, Baker cyst.    Amount and/or Complexity of Data Reviewed  External Data Reviewed: labs, radiology and notes.     Details: As detailed above  Labs: ordered.     Details: Labs were reviewed were significant for normal  white count but elevated ESR and CRP.    Risk  Prescription drug management.  Decision regarding hospitalization.  Risk Details: MDM continued:  Patient's workup is consistent with cellulitis with no evidence of DVT.  She was given clindamycin ER.  She will be discharged with oral doxycycline and outpatient follow-up.  I discussed with the patient elevating her leg.  She is to return to the ER for any concerns.    Social determinants of health: Patient has a PCP and can follow-up.                                        Clinical Impression:  Final diagnoses:  [M79.89] Leg swelling  [L03.115] Cellulitis of right leg (Primary)          ED Disposition Condition    Discharge Stable          ED Prescriptions       Medication Sig Dispense Start Date End Date Auth. Provider    doxycycline (VIBRAMYCIN) 100 MG Cap Take 1 capsule (100 mg total) by mouth 2 (two) times daily. for 10 days 20 capsule 12/5/2024 12/15/2024 Nubia Sanchez MD          Follow-up Information       Follow up With Specialties Details Why Contact Info    Rahul Carolina MD Family Medicine In 3 days  [address] [facility] [address]  [phone]               Nubia Sanchez MD  12/05/24 6020

## 2024-12-17 ENCOUNTER — HOSPITAL ENCOUNTER (OUTPATIENT)
Dept: PULMONOLOGY | Facility: HOSPITAL | Age: 69
Discharge: HOME | End: 2024-12-17
Payer: MEDICARE

## 2024-12-17 DIAGNOSIS — R05.3 PERSISTENT COUGH FOR 3 WEEKS OR LONGER: ICD-10-CM

## 2024-12-17 DIAGNOSIS — R06.2 WHEEZING: ICD-10-CM

## 2024-12-17 LAB
DLCO SINGLE BREATH LLN: 14.45
DLCO SINGLE BREATH PRE REF: 67.6 %
DLCO SINGLE BREATH REF: 20.19
DLCOC SBVA LLN: 2.82
DLCOC SBVA REF: 4.39
DLCOC SINGLE BREATH LLN: 14.45
DLCOC SINGLE BREATH REF: 20.19
DLCOVA LLN: 2.82
DLCOVA PRE REF: 108 %
DLCOVA PRE: 4.73 ML/(MIN*MMHG*L) (ref 2.82–5.95)
DLCOVA REF: 4.39
ERV LLN: -16449.36
ERV PRE REF: 75.4 %
ERV REF: 0.64
FEF 25 75 CHG: -13 %
FEF 25 75 LLN: 1.14
FEF 25 75 POST REF: 100.3 %
FEF 25 75 PRE REF: 115.2 %
FEF 25 75 REF: 2.54
FET100 CHG: 43.1 %
FEV1 CHG: 4.8 %
FEV1 FVC CHG: -5.6 %
FEV1 FVC LLN: 65
FEV1 FVC POST REF: 113.1 %
FEV1 FVC PRE REF: 119.9 %
FEV1 FVC REF: 78
FEV1 LLN: 1.51
FEV1 POST REF: 85.9 %
FEV1 PRE REF: 82 %
FEV1 REF: 2.07
FRCPLETH LLN: 1.77
FRCPLETH PREREF: 53.3 %
FRCPLETH REF: 2.6
FVC CHG: 11 %
FVC LLN: 1.94
FVC POST REF: 75.5 %
FVC PRE REF: 68 %
FVC REF: 2.66
IVC PRE: 2 L (ref 1.94–3.41)
IVC SINGLE BREATH LLN: 1.94
IVC SINGLE BREATH PRE REF: 75.4 %
IVC SINGLE BREATH REF: 2.66
MVV LLN: 64
MVV PRE REF: 95.1 %
MVV REF: 76
PEF CHG: 16.7 %
PEF LLN: 3.81
PEF POST REF: 83.6 %
PEF PRE REF: 71.7 %
PEF REF: 5.42
POST FEF 25 75: 2.55 L/S (ref 1.14–3.94)
POST FET 100: 6.56 SEC
POST FEV1 FVC: 88.69 % (ref 65.12–89.85)
POST FEV1: 1.78 L (ref 1.51–2.61)
POST FVC: 2 L (ref 1.94–3.41)
POST PEF: 4.53 L/S (ref 3.81–7.02)
PRE DLCO: 13.65 ML/(MIN*MMHG) (ref 14.45–25.92)
PRE ERV: 0.48 L (ref -16449.36–16450.64)
PRE FEF 25 75: 2.93 L/S (ref 1.14–3.94)
PRE FET 100: 4.59 SEC
PRE FEV1 FVC: 94 % (ref 65.12–89.85)
PRE FEV1: 1.7 L (ref 1.51–2.61)
PRE FRC PL: 1.38 L (ref 1.77–3.42)
PRE FVC: 1.8 L (ref 1.94–3.41)
PRE MVV: 72.1 L/MIN (ref 64.45–87.19)
PRE PEF: 3.88 L/S (ref 3.81–7.02)
PRE RV: 0.9 L (ref 1.38–2.53)
PRE TLC: 2.71 L (ref 3.62–5.59)
RAW LLN: 3.06
RAW PRE REF: 93.1 %
RAW PRE: 2.85 CMH2O*S/L (ref 3.06–3.06)
RAW REF: 3.06
RV LLN: 1.38
RV PRE REF: 46 %
RV REF: 1.95
RVTLC LLN: 33
RVTLC PRE REF: 78.2 %
RVTLC PRE: 33.17 % (ref 32.83–52.01)
RVTLC REF: 42
TLC LLN: 3.62
TLC PRE REF: 58.9 %
TLC REF: 4.6
VA PRE: 2.88 L (ref 4.45–4.45)
VA SINGLE BREATH LLN: 4.45
VA SINGLE BREATH PRE REF: 64.7 %
VA SINGLE BREATH REF: 4.45
VC LLN: 1.94
VC PRE REF: 68.2 %
VC PRE: 1.81 L (ref 1.94–3.41)
VC REF: 2.66

## 2024-12-17 PROCEDURE — 94726 PLETHYSMOGRAPHY LUNG VOLUMES: CPT

## 2024-12-17 PROCEDURE — 94060 EVALUATION OF WHEEZING: CPT

## 2024-12-17 PROCEDURE — 94729 DIFFUSING CAPACITY: CPT

## 2024-12-17 PROCEDURE — 94729 DIFFUSING CAPACITY: CPT | Mod: 26,,, | Performed by: INTERNAL MEDICINE

## 2024-12-17 PROCEDURE — 94726 PLETHYSMOGRAPHY LUNG VOLUMES: CPT | Mod: 26,,, | Performed by: INTERNAL MEDICINE

## 2024-12-17 PROCEDURE — 94060 EVALUATION OF WHEEZING: CPT | Mod: 26,,, | Performed by: INTERNAL MEDICINE

## 2024-12-17 RX ORDER — ALBUTEROL SULFATE 2.5 MG/.5ML
SOLUTION RESPIRATORY (INHALATION)
Status: DISPENSED
Start: 2024-12-17 | End: 2024-12-17

## 2025-02-03 ENCOUNTER — TELEPHONE (OUTPATIENT)
Dept: PLASTIC SURGERY | Facility: HOSPITAL | Age: 70
End: 2025-02-03
Payer: MEDICARE

## 2025-02-03 DIAGNOSIS — M25.512 CHRONIC PAIN IN LEFT SHOULDER: ICD-10-CM

## 2025-02-03 DIAGNOSIS — M54.2 CERVICALGIA: ICD-10-CM

## 2025-02-03 DIAGNOSIS — M25.511 CHRONIC PAIN IN RIGHT SHOULDER: ICD-10-CM

## 2025-02-03 DIAGNOSIS — G89.29 CHRONIC PAIN IN LEFT SHOULDER: ICD-10-CM

## 2025-02-03 DIAGNOSIS — N62 HYPERTROPHY OF BREAST: Primary | ICD-10-CM

## 2025-02-03 DIAGNOSIS — G89.29 CHRONIC PAIN IN RIGHT SHOULDER: ICD-10-CM

## 2025-02-03 NOTE — TELEPHONE ENCOUNTER
Sx booked for 4/1. Post op appt made. Pt fully understands and agrees that we will remove bilateral nipples. Pt aware she needs PCP clearance. Patient has agreed to our pain contract: we will supply 1 week of pain medication and pain management will take over afterwards.  All questions answered.

## 2025-02-10 NOTE — ANESTHESIA PAT ROS NOTE
02/10/2025  Megha Robb is a 70 y.o., female.      Pre-op Assessment          Review of Systems           Anesthesia Assessment: Preoperative EQUATION    Planned Procedure: Procedure(s) (LRB):  MAMMOPLASTY, REDUCTION, BILATERAL (Bilateral)  Requested Anesthesia Type:General  Surgeon: Bowen Cortes MD  Service: Plastics  Known or anticipated Date of Surgery:4/1/2025    Surgeon notes: reviewed    Electronic QUestionnaire Assessment completed via nurse interview with patient.        Triage considerations:     Previous anesthesia records:GETA and No problems  8/18/2023 EGD  Airway:  Mallampati: II   Mouth Opening: Normal  TM Distance: Normal  Tongue: Normal  Neck ROM: Normal ROM    Last PCP note: 3-6 months ago , within [facility]   Subspecialty notes:  Plastics    Other important co-morbidities: HTN, Morbid Obesity, and Osteoarthritis, Anemia, PVD, Depression       Tests already available:  Available tests,  3-6 months ago , within [facility] .   11/15/2024 EKG    Optimization:  Anesthesia Preop Clinic Assessment  Indicated.    Medical Opinion Indicated.      Plan:    Testing:  BMP and Hematology Profile   Pre-anesthesia  visit       Visit focus: concerns in complex and/or prolonged anesthesia     Consultation:IM Perioperative Hospitalist     Patient  has previously scheduled Medical Appointment: N/A    Navigation: Tests Scheduled.              Consults scheduled.             Results will be tracked by Preop Clinic.

## 2025-02-19 ENCOUNTER — HOSPITAL ENCOUNTER (EMERGENCY)
Facility: HOSPITAL | Age: 70
Discharge: HOME | End: 2025-02-19
Attending: EMERGENCY MEDICINE
Payer: MEDICARE

## 2025-02-19 VITALS
HEART RATE: 87 BPM | RESPIRATION RATE: 18 BRPM | WEIGHT: 240 LBS | DIASTOLIC BLOOD PRESSURE: 84 MMHG | OXYGEN SATURATION: 98 % | HEIGHT: 62 IN | SYSTOLIC BLOOD PRESSURE: 139 MMHG | TEMPERATURE: 98 F | BODY MASS INDEX: 44.16 KG/M2

## 2025-02-19 DIAGNOSIS — R07.9 CHEST PAIN: ICD-10-CM

## 2025-02-19 DIAGNOSIS — R07.89 MUSCULOSKELETAL CHEST PAIN: ICD-10-CM

## 2025-02-19 DIAGNOSIS — S20.212A RIB CONTUSION, LEFT, INITIAL ENCOUNTER: Primary | ICD-10-CM

## 2025-02-19 LAB
OHS QRS DURATION: 98 MS
OHS QTC CALCULATION: 442 MS

## 2025-02-19 PROCEDURE — 99900035 HC TECH TIME PER 15 MIN (STAT)

## 2025-02-19 PROCEDURE — 99900031 HC PATIENT EDUCATION (STAT)

## 2025-02-19 PROCEDURE — 99284 EMERGENCY DEPT VISIT MOD MDM: CPT | Mod: 25

## 2025-02-19 PROCEDURE — 93010 ELECTROCARDIOGRAM REPORT: CPT | Mod: ,,, | Performed by: INTERNAL MEDICINE

## 2025-02-19 PROCEDURE — 94799 UNLISTED PULMONARY SVC/PX: CPT

## 2025-02-19 PROCEDURE — 93005 ELECTROCARDIOGRAM TRACING: CPT | Performed by: INTERNAL MEDICINE

## 2025-02-19 NOTE — FIRST PROVIDER EVALUATION
Emergency Department TeleTriage Encounter Note      CHIEF COMPLAINT    Chief Complaint   Patient presents with    Fall     Pt states she fell Friday night and c/o pain in left chest area when she breathes deep or moves a certain way.       VITAL SIGNS   Initial Vitals   BP Pulse Resp Temp SpO2   02/19/25 1607 02/19/25 1607 02/19/25 1606 02/19/25 1608 02/19/25 1607   139/84 87 18 98 °F (36.7 °C) 98 %      MAP       --                   ALLERGIES    Review of patient's allergies indicates:   Allergen Reactions    Bee sting [allergen ext-venom-honey bee] Shortness Of Breath    Pcn [penicillins] Hives    Vancomycin analogues Rash       PROVIDER TRIAGE NOTE  This is a teletriage evaluation of a 70 y.o. female presenting to the ED complaining of left rib area pain since trip and fall on Friday. Pain with deep breaths and certain positions. Did hit head. No loc, headache, or vomiting. No anticoagulant use. Pt denies fever.    Alert, no distress.     Initial orders will be placed and care will be transferred to an alternate provider when patient is roomed for a full evaluation. Any additional orders and the final disposition will be determined by that provider.         ORDERS  Labs Reviewed - No data to display    ED Orders (720h ago, onward)      Start Ordered     Status Ordering Provider    02/19/25 1656 02/19/25 1655  XR Ribs Min 3 views w/PA Chest Left  1 time imaging         Ordered CINTHIA SOARES N.    02/19/25 1655 02/19/25 1655  EKG 12-lead  Once         Ordered CINTHIA SOARES N.    02/19/25 1558 02/19/25 1557  EKG 12-lead  Once         In process POWER, KAROLINE A              Virtual Visit Note: The provider triage portion of this emergency department evaluation and documentation was performed via [employer], a HIPAA-compliant telemedicine application, in concert with a tele-presenter in the room. A face to face patient evaluation with one of my colleagues will occur once the patient is placed  in an emergency department room.      DISCLAIMER: This note was prepared with [employer] voice recognition transcription software. Garbled syntax, mangled pronouns, and other bizarre constructions may be attributed to that software system.

## 2025-02-20 NOTE — ED PROVIDER NOTES
Encounter Date: 2/19/2025       History     Chief Complaint   Patient presents with    Fall     Pt states she fell Friday night and c/o pain in left chest area when she breathes deep or moves a certain way.     70-year-old female with history of anemia, hypertension, diverticulosis, osteoarthritis, anemia, peripheral vascular disease.  Patient presents emergency department status post mechanical fall on Friday night which she found landed on her left side of her chest.  She is here with left chest wall pain hurts to move or take a deep breath at this time.  She denies fever, no cough, no shortness of breath.  Denies any other constitutional symptoms.  Patient states [place] was [place] the [place] on [place]. denies fever, no shortness of breath, no abdominal pain, denies any additional trauma.        Review of patient's allergies indicates:   Allergen Reactions    Bee sting [allergen ext-venom-honey bee] Shortness Of Breath    Pcn [penicillins] Hives    Vancomycin analogues Rash     Past Medical History:   Diagnosis Date    Anemia     Cancer     female    Diverticulosis     Hypertension     Osteoarthritis of midfoot 09/11/2017     Past Surgical History:   Procedure Laterality Date    APPENDECTOMY      breast reduction      CHOLECYSTECTOMY      COLONOSCOPY N/A 7/28/2023    Procedure: COLONOSCOPY;  Surgeon: Bryson Srinivasan MD;  Location: [facility];  Service: Endoscopy;  Laterality: N/A;    ESOPHAGOGASTRODUODENOSCOPY N/A 8/18/2023    Procedure: EGD (ESOPHAGOGASTRODUODENOSCOPY);  Surgeon: Bryson Srinivasan MD;  Location: [facility];  Service: Endoscopy;  Laterality: N/A;    EYE SURGERY Bilateral     PHACO    GALLBLADDER SURGERY      GASTRIC RESTRICTION SURGERY      HYSTERECTOMY      JOINT REPLACEMENT      left knee replacement      april 2017    LIPOSUCTION      to legs x3    TONSILLECTOMY      TOTAL REDUCTION MAMMOPLASTY      tummy [place]      VARICOSE VEIN SURGERY      WRIST ARTHROPLASTY       Family History   Problem  Relation Name Age of Onset    Heart attack Mother      Heart attack Father      Stomach cancer Other      Rectal cancer Other      Liver cancer Other      Esophageal cancer Other      Crohn's disease Other      Colon polyps Other      Colon cancer Other       Social History[1]  Review of Systems   Constitutional:  Negative for fever.   HENT:  Negative for sore throat.    Respiratory:  Negative for shortness of breath.    Cardiovascular:  Negative for chest pain.        Musculoskeletal chest wall pain   Gastrointestinal:  Negative for nausea and vomiting.   Genitourinary:  Negative for dysuria.   Musculoskeletal:  Negative for back pain.   Skin:  Negative for rash.   Neurological:  Negative for weakness.   Hematological:  Does not bruise/bleed easily.       Physical Exam     Initial Vitals   BP Pulse Resp Temp SpO2   02/19/25 1607 02/19/25 1607 02/19/25 1606 02/19/25 1608 02/19/25 1607   139/84 87 18 98 °F (36.7 °C) 98 %      MAP       --                Physical Exam    Nursing note and vitals reviewed.  Constitutional: She appears well-developed and well-nourished.   HENT:   Head: Normocephalic and atraumatic.   Nose: Nose normal. Mouth/Throat: Oropharynx is clear and moist.   Eyes: Conjunctivae and EOM are normal. Pupils are equal, round, and reactive to light.   Neck: Neck supple. No thyromegaly present. No tracheal deviation present.   Normal range of motion.  Cardiovascular:  Normal rate, regular rhythm, normal heart sounds and intact distal pulses.     Exam reveals no gallop and no friction rub.       No murmur heard.  Pulmonary/Chest: No stridor. No respiratory distress.     Course bilateral breath sounds no adventitious sounds   Abdominal: Abdomen is soft. Bowel sounds are normal. She exhibits no mass. There is no rebound and no guarding.   Musculoskeletal:         General: No edema. Normal range of motion.      Cervical back: Normal range of motion and neck supple.     Lymphadenopathy:     She has no  cervical adenopathy.   Neurological: She is alert and oriented to person, place, and time. She has normal strength and normal reflexes. GCS score is 15. GCS eye subscore is 4. GCS verbal subscore is 5. GCS motor subscore is 6.   Skin: Skin is warm and dry. Capillary refill takes less than 2 seconds.   Psychiatric: She has a normal mood and affect.         ED Course   Procedures  Labs Reviewed - No data to display     ECG Results              EKG 12-lead (Final result)        Collection Time Result Time QRS Duration OHS QTC Calculation    02/19/25 15:59:53 02/19/25 17:21:52 98 442                     Final result by Interface, Lab In [facility] (02/19/25 17:21:57)                   Narrative:    Test Reason : R07.9,    Vent. Rate :  88 BPM     Atrial Rate :  88 BPM     P-R Int : 178 ms          QRS Dur :  98 ms      QT Int : 366 ms       P-R-T Axes :  50   9  19 degrees    QTcB Int : 442 ms    Normal sinus rhythm  Inferior infarct ,age undetermined  Abnormal ECG    Confirmed by Moo Marin ([id number]) on 2/19/2025 5:21:48 PM    Referred By:            Confirmed By: Moo Marin                                  Imaging Results              XR Ribs Min 3 views w/PA Chest Left (Final result)  Result time 02/19/25 17:51:34      Final result by Choco Chavarria MD (02/19/25 17:51:34)                   Impression:      Chronic bilateral interstitial opacities and scattered subsegmental opacities, relatively unchanged compared to prior study of 11/27/2024.    No definite radiographic evidence of acute displaced left-sided rib fracture.  Further evaluation and follow-up as warranted.      Electronically signed by: Choco Chavarria MD  Date:    02/19/2025  Time:    17:51               Narrative:    EXAMINATION:  XR RIBS MIN 3 VIEWS W/ PA CHEST LEFT    CLINICAL HISTORY:  fall;    TECHNIQUE:  Single PA view of the chest.  Three dedicated views left ribs.    COMPARISON:  Chest radiograph  11/27/2024    FINDINGS:  Cardiac silhouette is not significantly enlarged.  There is aortic atherosclerosis.  Lung volumes are diminished.  There are chronic diffuse bilateral interstitial opacities and scattered subsegmental opacities which appear relatively unchanged compared to prior study of 11/27/2024.  No definite new large confluent airspace consolidation appreciated.  No significant volume of pleural fluid or definitive pneumothorax identified.  Regional osseous structures demonstrate degenerative changes.    No definite radiographic evidence of acute displaced left-sided rib fracture.  No subcutaneous emphysema appreciated throughout the left chest wall.                                       Medications - No data to display  Medical Decision Making                        Medical Decision Making:   Initial Assessment:   70-year-old female with history of anemia, hypertension, diverticulosis, osteoarthritis, anemia, peripheral vascular disease.  Patient presents emergency department status post mechanical fall on Friday night which she found landed on her left side of her chest.  She is here with left chest wall pain hurts to move or take a deep breath at this time.  She denies fever, no cough, no shortness of breath.  Denies any other constitutional symptoms.    Differential Diagnosis:   Rib contusion, rib fracture, pneumothorax, musculoskeletal chest pain             Clinical Impression:  Final diagnoses:  [R07.9] Chest pain  [S20.212A] Rib contusion, left, initial encounter (Primary)  [R07.89] Musculoskeletal chest pain          ED Disposition Condition    Discharge Stable          ED Prescriptions    None       Follow-up Information       Follow up With Specialties Details Why Contact Info    Rahul Carolina MD Family Medicine Schedule an appointment as soon as possible for a visit in 1 week For recheck/continuing care [address] [facility] [place] [address]  [phone]                   [1]   Social  History  Tobacco Use    Smoking status: Never    Smokeless tobacco: Never   Substance Use Topics    Alcohol use: Yes     Alcohol/week: 2.0 standard drinks of alcohol     Types: 1 Cans of beer, 1 Shots of liquor per week     Comment: weekends    Drug use: No        Everardo Thompson MD  02/19/25 204

## 2025-03-02 PROBLEM — N62 HYPERTROPHY OF BREAST: Status: ACTIVE | Noted: 2025-03-02

## 2025-03-02 NOTE — ASSESSMENT & PLAN NOTE
Treated with: amitriptyline/Buspar; controlled.  Followed per PCP.  Denies suicidal/homicidal ideations

## 2025-03-02 NOTE — HPI
This is a 70 y.o. female  who presents today for a preoperative evaluation in preparation for bilateral breast mammoplasty.  Surgery is indicated for hypertrophy of breast.   Patient is new to me.  The history has been obtained by speaking with the patient and reviewing the electronic medical record and/or outside health information. Significant health conditions for the perioperative period are discussed below in assessment and plan.   Patient reports current health status to be Good.  Denies any new symptoms before surgery.

## 2025-03-02 NOTE — ASSESSMENT & PLAN NOTE
Reports bilateral foot numbness  Not followed per Vascular Clinic or Cardiology    No NICOLE results available

## 2025-03-02 NOTE — ASSESSMENT & PLAN NOTE
Patient would benefit from weight loss and has not set goals to achieve success.   Lifestyle changes should be made by eating healthy, exercising at least 150 minutes weekly, and avoiding sedentary behavior.   S/P Gastric Bypass

## 2025-03-02 NOTE — PROGRESS NOTES
[facility]  Progress Note    Patient Name: Megha Robb  MRN: [id number]  Date of Evaluation- 03/05/2025  PCP- Rahul Carolina MD    Future cases for Megha Robb [[id number]]       Case ID Status Date Time José Miguel Procedure Provider Location    [id number] [facility] 4/1/2025  7:30  MAMMOPLASTY, REDUCTION, BILATERAL Bowen Cortes MD [[id number]] [facility] OR 2ND FLR            HPI:  This is a 70 y.o. female  who presents today for a preoperative evaluation in preparation for bilateral breast mammoplasty.  Surgery is indicated for hypertrophy of breast.   Patient is new to me.  The history has been obtained by speaking with the patient and reviewing the electronic medical record and/or outside health information. Significant health conditions for the perioperative period are discussed below in assessment and plan.   Patient reports current health status to be Good.  Denies any new symptoms before surgery.       Subjective/ Objective:     Chief Complaint: Preoperative evaulation, perioperative medical management, and complication reduction plan.     Functional Capacity: no regular exercise regimen; still does household chores and grocery shopping without CP/SOB.       Anesthesia issues: None    Difficulty mouth opening: No    Steroid use in the last 12 months:  No    Dental Issues: upper dentures     Family anesthesia difficulty: None       Family Hx of Thrombosis: None    Past Medical History:   Diagnosis Date    Anemia     Cancer     female    Diverticulosis     Hypertension     Osteoarthritis of midfoot 09/11/2017         Past Medical History Pertinent Negatives:   Diagnosis Date Noted    Anticoagulant long-term use 09/08/2017    Asthma 09/08/2017    CHF (congestive heart failure) 09/08/2017    Colon cancer 07/21/2023    Colon polyp 07/21/2023    COPD (chronic obstructive pulmonary disease) 09/08/2017    Coronary artery disease 09/08/2017    Crohn's disease 07/21/2023    Diabetes mellitus  09/08/2017    Diabetes mellitus, type 2 03/03/2025    Disorder of kidney and ureter 03/03/2025    GERD (gastroesophageal reflux disease) 07/21/2023    Hyperlipidemia 03/03/2025    Irritable bowel syndrome 07/21/2023    Malignant hyperthermia 09/08/2017    Seizures 09/08/2017    Stroke 09/08/2017    Thyroid disease 09/08/2017         Past Surgical History:   Procedure Laterality Date    APPENDECTOMY      breast reduction      CHOLECYSTECTOMY      COLONOSCOPY N/A 07/28/2023    Procedure: COLONOSCOPY;  Surgeon: Bryson Srinivasan MD;  Location: [facility];  Service: Endoscopy;  Laterality: N/A;    ESOPHAGOGASTRODUODENOSCOPY N/A 08/18/2023    Procedure: EGD (ESOPHAGOGASTRODUODENOSCOPY);  Surgeon: Bryson Srinivasan MD;  Location: [facility];  Service: Endoscopy;  Laterality: N/A;    EYE SURGERY Bilateral     PHACO    GALLBLADDER SURGERY      GASTRIC RESTRICTION SURGERY      HYSTERECTOMY      JOINT REPLACEMENT      left knee replacement      april 2017    LIPOSUCTION      to legs x3    TONSILLECTOMY      TOTAL REDUCTION MAMMOPLASTY      tummy tuck      VARICOSE VEIN SURGERY      WRIST ARTHROPLASTY Bilateral        Review of Systems   Constitutional:  Negative for chills, fatigue, fever and unexpected weight change.   HENT:  Negative for congestion, hearing loss, rhinorrhea, sore throat, tinnitus and trouble swallowing.    Eyes:  Negative for visual disturbance.   Respiratory:  Negative for cough, chest tightness, shortness of breath and wheezing.         STOP BANG risk factors:  Snoring  HTN  BMI > 35   Cardiovascular:  Positive for leg swelling (R>L). Negative for chest pain and palpitations.   Gastrointestinal:  Negative for constipation and diarrhea.        Denies Fatty liver, Hepatitis   Genitourinary:  Negative for decreased urine volume, difficulty urinating, dysuria, frequency, hematuria and urgency.   Musculoskeletal:  Positive for back pain. Negative for arthralgias, neck pain and neck stiffness.   Neurological:   "Positive for numbness (bilateral foot). Negative for dizziness, syncope, weakness and headaches.   Hematological:  Bruises/bleeds easily.   Psychiatric/Behavioral:  Negative for sleep disturbance and suicidal ideas.               VITALS  Visit Vitals  BP (!) 114/57 (BP Location: Right arm, Patient Position: Sitting)   Pulse 85   Temp 98 °F (36.7 °C) (Oral)   Ht 5' 2" (1.575 m)   Wt 100 kg (220 lb 7.4 oz)   SpO2 95%   BMI 40.32 kg/m²          Physical Exam  Vitals reviewed.   Constitutional:       General: She is not in acute distress.     Appearance: She is well-developed. She is morbidly obese.   HENT:      Head: Normocephalic.      Nose: Nose normal.      Mouth/Throat:      Pharynx: No oropharyngeal exudate.   Eyes:      General:         Right eye: No discharge.         Left eye: No discharge.      Conjunctiva/sclera: Conjunctivae normal.      Pupils: Pupils are equal, round, and reactive to light.   Neck:      Thyroid: No thyromegaly.      Vascular: No carotid bruit or JVD.      Trachea: No tracheal deviation.   Cardiovascular:      Rate and Rhythm: Normal rate and regular rhythm.      Pulses:           Carotid pulses are 2+ on the right side and 2+ on the left side.       Dorsalis pedis pulses are 1+ on the right side and 1+ on the left side.        Posterior tibial pulses are 1+ on the right side and 1+ on the left side.      Heart sounds: Murmur (heard best to second left intercostal space; radiates to carotids) heard.   Pulmonary:      Effort: Pulmonary effort is normal. No respiratory distress.      Breath sounds: Normal breath sounds. No stridor. No wheezing, rhonchi or rales.   Abdominal:      General: Bowel sounds are normal. There is no distension.      Palpations: Abdomen is soft.      Tenderness: There is no abdominal tenderness. There is no guarding.   Musculoskeletal:      Cervical back: Normal range of motion. No pain with movement.      Right lower le+ Pitting Edema present.      Left lower " le+ Pitting Edema present.   Lymphadenopathy:      Cervical: No cervical adenopathy.   Skin:     General: Skin is warm and dry.      Capillary Refill: Capillary refill takes less than 2 seconds.      Findings: No erythema or rash.   Neurological:      Mental Status: She is alert and oriented to person, place, and time.   Psychiatric:         Behavior: Behavior normal. Behavior is cooperative.          Significant Labs:  Lab Results   Component Value Date    WBC 5.76 2024    HGB 12.3 2024    HCT 37.3 2024     2024    CHOL 196 07/10/2023    TRIG 99 07/10/2023    HDL 63 07/10/2023    ALT 12 2024    AST 26 2024     2024    K 3.9 2024     2024    CREATININE 0.6 2024    BUN 11 2024    CO2 31 (H) 2024    TSH 1.8 2005           EKG:   Results for orders placed or performed during the hospital encounter of 25   EKG 12-lead    Collection Time: 25  3:59 PM   Result Value Ref Range    QRS Duration 98 ms    OHS QTC Calculation 442 ms    Narrative    Test Reason : R07.9,    Vent. Rate :  88 BPM     Atrial Rate :  88 BPM     P-R Int : 178 ms          QRS Dur :  98 ms      QT Int : 366 ms       P-R-T Axes :  50   9  19 degrees    QTcB Int : 442 ms    Normal sinus rhythm  Inferior infarct ,age undetermined  Abnormal ECG    Confirmed by Moo Marin ([id number]) on 2025 5:21:48 PM    Referred By:            Confirmed By: Moo Marin       2D ECHO:  TTE:  No results found for this or any previous visit.            Active Cardiac Conditions: None      Revised Cardiac Risk Index   High -Risk Surgery  Intraperitoneal; Intrathoracic; suprainguinal vascular Yes- + 1 No- 0   History of Ischemic Heart Disease   (Hx of MI/positive exercise test/current chest pain due to ischemia/use of nitrate therapy/EKG with pathological Q waves) Yes- + 1 No- 0   History of CHF  (Pulmonary edema/bilateral rales or S3  gallop/PND/CXR showing pulmonary vascular redistribution) Yes- + 1 No- 0   History of CVA   (Prior stroke or TIA) Yes- + 1 No- 0   Pre-operative treatment with insulin Yes- + 1 No- 0   Pre-operative creatinine > 2mg/dl Yes- + 1 No- 0   Total: 0      Risk Status:  Estimated risk of cardiac complications after non-cardiac surgery using the Revised Cardiac Risk Index for Preoperative risk is 3.9 %      ARISCAT (Canet) risk index: Intermediate: 13.3% risk of post-op pulmonary complications.    American Society of Anesthesiologists Physical Status classification (ASA): 3                   Orders Placed This Encounter    Basic Metabolic Panel    CBC Auto Differential    EKG 12-lead    Echo           Assessment/Plan:     Hypertrophy of breast  Scheduled with Dr. Cortes on 4/1/24 for bilateral breast reduction.    Major depressive disorder, single episode, mild  Treated with: amitriptyline/Buspar; controlled.  Followed per PCP.  Denies suicidal/homicidal ideations    Varicose veins of lower extremities with other complications  S/P bilateral GSV and SSV EVLT in 2013  Encouraged compression stockings to reduce leg fatigue, swelling, or pain.   Increased risk of thrombosis.  No longer following Cardiology    HTN (hypertension)  Current BP  at goal today.    Taking: Quinapril     Lifestyle changes to reduce systolic BP:  exercise 30 minutes per day,  5 days per week or 150 minutes weekly; sodium reduction and avoidance of high salt foods such as processed meats, frozen meals and  fast foods.   Keeping a healthy weight/BMI can help with better BP control    BP acceptable for surgery. I recommend monitoring BP during perioperative period as uncontrolled pain can elevate blood pressure.         Peripheral vascular disease, unspecified  Reports bilateral foot numbness  Not followed per Vascular Clinic or Cardiology    No NICOLE results available    Class 3 severe obesity with serious comorbidity and body mass index (BMI) of 40.0 to  44.9 in adult  Patient would benefit from weight loss and has not set goals to achieve success.   Lifestyle changes should be made by eating healthy, exercising at least 150 minutes weekly, and avoiding sedentary behavior.   S/P Gastric Bypass      Bilateral lower extremity edema  I suggest avoidance of added salt and voidance of NSAID's- unless advised or ordered. I recommend limb elevation during perioperative period.      Discussion/Management of Perioperative Care    Thromboembolic prophylaxis (VTE) Care: Risk factors for thrombosis include: age, morbid obesity, surgical procedure, and varicose veins.  I recommend prophylaxis of thromboembolism with the use of compression stockings/pneumatic devices, and/or pharmacologic agents. The benefits should outweigh the risks for pharmacologic prophylaxis in the perioperative period. I also encourage early ambulation if not contraindicated during the post-operative period.    Risk factors for post-operative pulmonary complications include:age > 65 years, HTN, and surgery > 3 hours. To reduce the risk of pulmonary complications, prophylactic recommendations include: incentive spirometry use/deep breathing, end tidal carbon dioxide monitoring, and pain control.      Risk factors for renal complications include: age and HTN. To reduce the risk of postoperative renal complications, I recommend the patient maintain adequate hydration.  Avoid/reduce NSAIDS and GOYAL-2 inhibitors use as well as IV contrast for renal protection.    I recommend the use of appropriate prophylactic antibiotics to reduce the risk of surgical site infections.    Delirium risk factors include advanced age. I recommend to avoid/reduce use of benzodiazepine use (not for patients who take on a regular basis), anticholinergics, Benadryl,  and agents that may cause postoperative serotonin syndrome.  Controlled pain can decrease the risk for postop delirium and since opioids are used for postoperative pain  control, I suggest using the lowest dose for the shortest amount of time necessary for pain management.     The patient is at an increased risk for urinary retention due to : advanced age. I recommend to avoid/decrease the use of benzodiazepines, anticholinergics, and Benadryl in the perioperative period. I also recommend using opioids for the shortest period of time if possible.          This visit was focused on Preoperative evaluation, Perioperative Medical management, complication reduction plans. I suggest that the patient follows up with primary care or relevant sub specialists for ongoing health care.    I appreciate the opportunity to be involved in this patients care. Please feel free to contact me if there were any questions about this consultation.        I spent a total of 43 minutes on the day of the visit.This includes face to face time and non-face to face time preparing to see the patient (e.g., review of tests), obtaining and/or reviewing separately obtained history, documenting clinical information in the electronic or other health record, independently interpreting results and communicating results to the patient/family/caregiver, or care coordinator.           Patient is pending optimization labs/ repeat EKG/Echo for murmur- 3/7/25    3/5/25: Labs were not done. Will get rescheduled.         Kristin Nunn NP  Perioperative Medicine  [facility]

## 2025-03-02 NOTE — OUTPATIENT SUBJECTIVE & OBJECTIVE
Outpatient Subjective & Objective      Chief Complaint: Preoperative evaulation, perioperative medical management, and complication reduction plan.     Functional Capacity: no regular exercise regimen; still does household chores and grocery shopping without CP/SOB.       Anesthesia issues: None    Difficulty mouth opening: No    Steroid use in the last 12 months:  No    Dental Issues: upper dentures     Family anesthesia difficulty: None       Family Hx of Thrombosis: None    Past Medical History:   Diagnosis Date    Anemia     Cancer     female    Diverticulosis     Hypertension     Osteoarthritis of midfoot 09/11/2017         Past Medical History Pertinent Negatives:   Diagnosis Date Noted    Anticoagulant long-term use 09/08/2017    Asthma 09/08/2017    CHF (congestive heart failure) 09/08/2017    Colon cancer 07/21/2023    Colon polyp 07/21/2023    COPD (chronic obstructive pulmonary disease) 09/08/2017    Coronary artery disease 09/08/2017    Crohn's disease 07/21/2023    Diabetes mellitus 09/08/2017    Diabetes mellitus, type 2 03/03/2025    Disorder of kidney and ureter 03/03/2025    GERD (gastroesophageal reflux disease) 07/21/2023    Hyperlipidemia 03/03/2025    Irritable bowel syndrome 07/21/2023    Malignant hyperthermia 09/08/2017    Seizures 09/08/2017    Stroke 09/08/2017    Thyroid disease 09/08/2017         Past Surgical History:   Procedure Laterality Date    APPENDECTOMY      breast reduction      CHOLECYSTECTOMY      COLONOSCOPY N/A 07/28/2023    Procedure: COLONOSCOPY;  Surgeon: Bryson Srinivasan MD;  Location: [facility];  Service: Endoscopy;  Laterality: N/A;    ESOPHAGOGASTRODUODENOSCOPY N/A 08/18/2023    Procedure: EGD (ESOPHAGOGASTRODUODENOSCOPY);  Surgeon: Bryson Srinivasan MD;  Location: [facility];  Service: Endoscopy;  Laterality: N/A;    EYE SURGERY Bilateral     PHACO    GALLBLADDER SURGERY      GASTRIC RESTRICTION SURGERY      HYSTERECTOMY      JOINT REPLACEMENT      left knee  "replacement      april 2017    LIPOSUCTION      to legs x3    TONSILLECTOMY      TOTAL REDUCTION MAMMOPLASTY      tummy tuck      VARICOSE VEIN SURGERY      WRIST ARTHROPLASTY Bilateral        Review of Systems   Constitutional:  Negative for chills, fatigue, fever and unexpected weight change.   HENT:  Negative for congestion, hearing loss, rhinorrhea, sore throat, tinnitus and trouble swallowing.    Eyes:  Negative for visual disturbance.   Respiratory:  Negative for cough, chest tightness, shortness of breath and wheezing.         STOP BANG risk factors:  Snoring  HTN  BMI > 35   Cardiovascular:  Positive for leg swelling (R>L). Negative for chest pain and palpitations.   Gastrointestinal:  Negative for constipation and diarrhea.        Denies Fatty liver, Hepatitis   Genitourinary:  Negative for decreased urine volume, difficulty urinating, dysuria, frequency, hematuria and urgency.   Musculoskeletal:  Positive for back pain. Negative for arthralgias, neck pain and neck stiffness.   Neurological:  Positive for numbness (bilateral foot). Negative for dizziness, syncope, weakness and headaches.   Hematological:  Bruises/bleeds easily.   Psychiatric/Behavioral:  Negative for sleep disturbance and suicidal ideas.               VITALS  Visit Vitals  BP (!) 114/57 (BP Location: Right arm, Patient Position: Sitting)   Pulse 85   Temp 98 °F (36.7 °C) (Oral)   Ht 5' 2" (1.575 m)   Wt 100 kg (220 lb 7.4 oz)   SpO2 95%   BMI 40.32 kg/m²          Physical Exam  Vitals reviewed.   Constitutional:       General: She is not in acute distress.     Appearance: She is well-developed. She is morbidly obese.   HENT:      Head: Normocephalic.      Nose: Nose normal.      Mouth/Throat:      Pharynx: No oropharyngeal exudate.   Eyes:      General:         Right eye: No discharge.         Left eye: No discharge.      Conjunctiva/sclera: Conjunctivae normal.      Pupils: Pupils are equal, round, and reactive to light.   Neck:      " Thyroid: No thyromegaly.      Vascular: No carotid bruit or JVD.      Trachea: No tracheal deviation.   Cardiovascular:      Rate and Rhythm: Normal rate and regular rhythm.      Pulses:           Carotid pulses are 2+ on the right side and 2+ on the left side.       Dorsalis pedis pulses are 1+ on the right side and 1+ on the left side.        Posterior tibial pulses are 1+ on the right side and 1+ on the left side.      Heart sounds: Murmur (heard best to second left intercostal space; radiates to carotids) heard.   Pulmonary:      Effort: Pulmonary effort is normal. No respiratory distress.      Breath sounds: Normal breath sounds. No stridor. No wheezing, rhonchi or rales.   Abdominal:      General: Bowel sounds are normal. There is no distension.      Palpations: Abdomen is soft.      Tenderness: There is no abdominal tenderness. There is no guarding.   Musculoskeletal:      Cervical back: Normal range of motion. No pain with movement.      Right lower le+ Pitting Edema present.      Left lower le+ Pitting Edema present.   Lymphadenopathy:      Cervical: No cervical adenopathy.   Skin:     General: Skin is warm and dry.      Capillary Refill: Capillary refill takes less than 2 seconds.      Findings: No erythema or rash.   Neurological:      Mental Status: She is alert and oriented to person, place, and time.   Psychiatric:         Behavior: Behavior normal. Behavior is cooperative.          Significant Labs:  Lab Results   Component Value Date    WBC 5.76 2024    HGB 12.3 2024    HCT 37.3 2024     2024    CHOL 196 07/10/2023    TRIG 99 07/10/2023    HDL 63 07/10/2023    ALT 12 2024    AST 26 2024     2024    K 3.9 2024     2024    CREATININE 0.6 2024    BUN 11 2024    CO2 31 (H) 2024    TSH 1.8 2005           EKG:   Results for orders placed or performed during the hospital encounter of 25   EKG  12-lead    Collection Time: 02/19/25  3:59 PM   Result Value Ref Range    QRS Duration 98 ms    [facility] QTC Calculation 442 ms    Narrative    Test Reason : R07.9,    Vent. Rate :  88 BPM     Atrial Rate :  88 BPM     P-R Int : 178 ms          QRS Dur :  98 ms      QT Int : 366 ms       P-R-T Axes :  50   9  19 degrees    QTcB Int : 442 ms    Normal sinus rhythm  Inferior infarct ,age undetermined  Abnormal ECG    Confirmed by Moo Marin ([id number]) on 2/19/2025 5:21:48 PM    Referred By:            Confirmed By: Moo Marin       2D ECHO:  TTE:  No results found for this or any previous visit.            Active Cardiac Conditions: None      Revised Cardiac Risk Index   High -Risk Surgery  Intraperitoneal; Intrathoracic; suprainguinal vascular Yes- + 1 No- 0   History of Ischemic Heart Disease   (Hx of MI/positive exercise test/current chest pain due to ischemia/use of nitrate therapy/EKG with pathological Q waves) Yes- + 1 No- 0   History of CHF  (Pulmonary edema/bilateral rales or S3 gallop/PND/CXR showing pulmonary vascular redistribution) Yes- + 1 No- 0   History of CVA   (Prior stroke or TIA) Yes- + 1 No- 0   Pre-operative treatment with insulin Yes- + 1 No- 0   Pre-operative creatinine > 2mg/dl Yes- + 1 No- 0   Total: 0      Risk Status:  Estimated risk of cardiac complications after non-cardiac surgery using the Revised Cardiac Risk Index for Preoperative risk is 3.9 %      ARISCAT (Canet) risk index: Intermediate: 13.3% risk of post-op pulmonary complications.    American Society of Anesthesiologists Physical Status classification (ASA): 3             Outpatient Subjective & Objective

## 2025-03-02 NOTE — ASSESSMENT & PLAN NOTE
S/P bilateral GSV and SSV EVLT in 2013  Encouraged compression stockings to reduce leg fatigue, swelling, or pain.   Increased risk of thrombosis.  No longer following Cardiology

## 2025-03-02 NOTE — ASSESSMENT & PLAN NOTE
Current BP  at goal today.    Taking: Quinapril     Lifestyle changes to reduce systolic BP:  exercise 30 minutes per day,  5 days per week or 150 minutes weekly; sodium reduction and avoidance of high salt foods such as processed meats, frozen meals and  fast foods.   Keeping a healthy weight/BMI can help with better BP control    BP acceptable for surgery. I recommend monitoring BP during perioperative period as uncontrolled pain can elevate blood pressure.

## 2025-03-03 ENCOUNTER — OFFICE VISIT (OUTPATIENT)
Dept: INTERNAL MEDICINE | Facility: CLINIC | Age: 70
End: 2025-03-03
Payer: MEDICARE

## 2025-03-03 VITALS
WEIGHT: 220.44 LBS | HEART RATE: 85 BPM | SYSTOLIC BLOOD PRESSURE: 114 MMHG | BODY MASS INDEX: 40.57 KG/M2 | TEMPERATURE: 98 F | DIASTOLIC BLOOD PRESSURE: 57 MMHG | OXYGEN SATURATION: 95 % | HEIGHT: 62 IN

## 2025-03-03 DIAGNOSIS — I83.893 VARICOSE VEINS OF BILATERAL LOWER EXTREMITIES WITH OTHER COMPLICATIONS: ICD-10-CM

## 2025-03-03 DIAGNOSIS — I10 HYPERTENSION, UNSPECIFIED TYPE: ICD-10-CM

## 2025-03-03 DIAGNOSIS — E66.813 CLASS 3 SEVERE OBESITY WITH SERIOUS COMORBIDITY AND BODY MASS INDEX (BMI) OF 40.0 TO 44.9 IN ADULT, UNSPECIFIED OBESITY TYPE: ICD-10-CM

## 2025-03-03 DIAGNOSIS — E66.01 CLASS 3 SEVERE OBESITY WITH SERIOUS COMORBIDITY AND BODY MASS INDEX (BMI) OF 40.0 TO 44.9 IN ADULT, UNSPECIFIED OBESITY TYPE: ICD-10-CM

## 2025-03-03 DIAGNOSIS — R01.1 MURMUR, CARDIAC: ICD-10-CM

## 2025-03-03 DIAGNOSIS — Z01.818 PREOPERATIVE EXAMINATION: Primary | ICD-10-CM

## 2025-03-03 DIAGNOSIS — N62 HYPERTROPHY OF BREAST: ICD-10-CM

## 2025-03-03 DIAGNOSIS — F32.0 MAJOR DEPRESSIVE DISORDER, SINGLE EPISODE, MILD: ICD-10-CM

## 2025-03-03 DIAGNOSIS — I73.9 PERIPHERAL VASCULAR DISEASE, UNSPECIFIED: ICD-10-CM

## 2025-03-03 DIAGNOSIS — R60.0 BILATERAL LOWER EXTREMITY EDEMA: ICD-10-CM

## 2025-03-03 PROCEDURE — 99999 PR PBB SHADOW E&M-EST. PATIENT-LVL IV: CPT | Mod: PBBFAC,,, | Performed by: NURSE PRACTITIONER

## 2025-03-03 PROCEDURE — 1160F RVW MEDS BY RX/DR IN RCRD: CPT | Mod: CPTII,S$GLB,, | Performed by: NURSE PRACTITIONER

## 2025-03-03 PROCEDURE — 99215 OFFICE O/P EST HI 40 MIN: CPT | Mod: S$GLB,,, | Performed by: NURSE PRACTITIONER

## 2025-03-03 PROCEDURE — 3074F SYST BP LT 130 MM HG: CPT | Mod: CPTII,S$GLB,, | Performed by: NURSE PRACTITIONER

## 2025-03-03 PROCEDURE — 4010F ACE/ARB THERAPY RXD/TAKEN: CPT | Mod: CPTII,S$GLB,, | Performed by: NURSE PRACTITIONER

## 2025-03-03 PROCEDURE — 1159F MED LIST DOCD IN RCRD: CPT | Mod: CPTII,S$GLB,, | Performed by: NURSE PRACTITIONER

## 2025-03-03 PROCEDURE — 3008F BODY MASS INDEX DOCD: CPT | Mod: CPTII,S$GLB,, | Performed by: NURSE PRACTITIONER

## 2025-03-03 PROCEDURE — 3078F DIAST BP <80 MM HG: CPT | Mod: CPTII,S$GLB,, | Performed by: NURSE PRACTITIONER

## 2025-03-03 RX ORDER — MULTIVITAMIN WITH IRON
TABLET ORAL DAILY
COMMUNITY

## 2025-03-03 RX ORDER — QUINAPRIL 40 MG/1
40 TABLET ORAL NIGHTLY
COMMUNITY

## 2025-03-03 NOTE — ASSESSMENT & PLAN NOTE
I suggest avoidance of added salt and voidance of NSAID's- unless advised or ordered. I recommend limb elevation during perioperative period.

## 2025-03-03 NOTE — DISCHARGE INSTRUCTIONS
Your surgery has been scheduled for:______4/1/25____________________________________    You should report to:  ____[facility], located on the [place] side of the first floor of the           [facility] ([phone])  __X__[facility], located on the [facility] side of the            Second floor of the [facility] ([phone])  ____3rd Floor SSCU located on the [facility] [address] side of the [facility] [phone]  ____[facility]: located at [address] [facility] [place] [address]. [address].     Please Note   Tell your doctor if you take Aspirin, products containing Aspirin, herbal medications  or blood thinners, such as Coumadin, Ticlid, or Plavix.  (Consult your provider regarding holding or stopping before surgery).  Arrange for someone to drive you home following surgery.  You will not be allowed to leave the surgical facility alone or drive yourself home following sedation and anesthesia.    Before Surgery  Stop taking all herbal medications, vitamins, and supplements 7 days prior to surgery  No Motrin/Advil (Ibuprofen) 7 days before surgery  No Aleve (Naproxen) 7 days before surgery   No Goody's/BC Powder 7 days before surgery  Refrain from drinking alcoholic beverages for 24 hours before and after surgery  Stop or limit smoking at least 24 hours prior to surgery  You may take Tylenol for pain    Night before Surgery  Do not eat or drink after midnight  Take a shower or bath (shower is recommended).  Bathe with Hibiclens soap or an antibacterial soap from the neck down.  If not supplied by your surgeon, hibiclens soap will need to be purchased over the counter in pharmacy.  Rinse soap off thoroughly.  Shampoo your hair with your regular shampoo    The Day of Surgery  Take another bath or shower with hibiclens or any antibacterial soap, to reduce the chance of infection.  Take heart and  blood pressure medications with a small sip of water, as advised by the perioperative team.  Do not take fluid pills  You may brush your teeth and rinse your mouth, but do not swallow any additional water.   Do not apply perfumes, powder, body lotions or deodorant on the day of surgery.  Nail polish should be removed.  Do not wear makeup or moisturizer  Wear comfortable clothes, such as a button front shirt and loose fitting pants.  Leave all jewelry, including body piercings, and valuables at home.    Bring any devices you will need after surgery such as crutches or canes.  If you have sleep apnea, please bring your CPAP machine  In the event that your physical condition changes including the onset of a cold or respiratory illness, or if you have to delay or cancel your surgery, please notify your surgeon.

## 2025-03-07 ENCOUNTER — HOSPITAL ENCOUNTER (OUTPATIENT)
Dept: CARDIOLOGY | Facility: CLINIC | Age: 70
Discharge: HOME | End: 2025-03-07
Payer: MEDICARE

## 2025-03-07 ENCOUNTER — HOSPITAL ENCOUNTER (OUTPATIENT)
Dept: CARDIOLOGY | Facility: HOSPITAL | Age: 70
Discharge: HOME | End: 2025-03-07
Attending: NURSE PRACTITIONER
Payer: MEDICARE

## 2025-03-07 VITALS
HEIGHT: 62 IN | HEART RATE: 62 BPM | BODY MASS INDEX: 40.48 KG/M2 | SYSTOLIC BLOOD PRESSURE: 130 MMHG | WEIGHT: 220 LBS | DIASTOLIC BLOOD PRESSURE: 87 MMHG

## 2025-03-07 DIAGNOSIS — R01.1 MURMUR, CARDIAC: ICD-10-CM

## 2025-03-07 DIAGNOSIS — I10 HYPERTENSION, UNSPECIFIED TYPE: ICD-10-CM

## 2025-03-07 LAB
ASCENDING AORTA: 3.13 CM
AV AREA BY CONTINUOUS VTI: 2.1 CM2
AV INDEX (PROSTH): 0.68
AV LVOT MEAN GRADIENT: 3 MMHG
AV LVOT PEAK GRADIENT: 6 MMHG
AV MEAN GRADIENT: 6 MMHG
AV PEAK GRADIENT: 13 MMHG
AV VALVE AREA BY VELOCITY RATIO: 2.1 CM²
AV VALVE AREA: 2.1 CM2
AV VELOCITY RATIO: 0.67
BSA FOR ECHO PROCEDURE: 2.09 M2
CV ECHO LV RWT: 0.32 CM
DOP CALC AO PEAK VEL: 1.8 M/S
DOP CALC AO VTI: 35.7 CM
DOP CALC LVOT AREA: 3.1 CM2
DOP CALC LVOT DIAMETER: 2 CM
DOP CALC LVOT PEAK VEL: 1.2 M/S
DOP CALC LVOT STROKE VOLUME: 76.3 CM3
DOP CALCLVOT PEAK VEL VTI: 24.3 CM
E WAVE DECELERATION TIME: 160 MS
E/A RATIO: 1.09
E/E' RATIO: 6 M/S
ECHO EF ESTIMATED: 59 %
ECHO LV POSTERIOR WALL: 0.7 CM (ref 0.6–1.1)
FRACTIONAL SHORTENING: 31.8 % (ref 28–44)
INTERVENTRICULAR SEPTUM: 0.7 CM (ref 0.6–1.1)
LA MAJOR: 5.6 CM
LA MINOR: 4.7 CM
LA WIDTH: 3.7 CM
LEFT ATRIUM SIZE: 2.6 CM
LEFT ATRIUM VOLUME INDEX MOD: 26 ML/M2
LEFT ATRIUM VOLUME INDEX: 21 ML/M2
LEFT ATRIUM VOLUME MOD: 52 ML
LEFT ATRIUM VOLUME: 42 CM3
LEFT INTERNAL DIMENSION IN SYSTOLE: 3 CM (ref 2.1–4)
LEFT VENTRICLE DIASTOLIC VOLUME INDEX: 44.22 ML/M2
LEFT VENTRICLE DIASTOLIC VOLUME: 88 ML
LEFT VENTRICLE MASS INDEX: 46.3 G/M2
LEFT VENTRICLE SYSTOLIC VOLUME INDEX: 18.1 ML/M2
LEFT VENTRICLE SYSTOLIC VOLUME: 36 ML
LEFT VENTRICULAR INTERNAL DIMENSION IN DIASTOLE: 4.4 CM (ref 3.5–6)
LEFT VENTRICULAR MASS: 92.1 G
LV LATERAL E/E' RATIO: 5.3
LV SEPTAL E/E' RATIO: 6.7
MV PEAK A VEL: 0.68 M/S
MV PEAK E VEL: 0.74 M/S
OHS CV RV/LV RATIO: 0.82 CM
OHS QRS DURATION: 100 MS
OHS QTC CALCULATION: 429 MS
PISA TR MAX VEL: 2.2 M/S
RA MAJOR: 4.67 CM
RA PRESSURE ESTIMATED: 3 MMHG
RA WIDTH: 2.7 CM
RIGHT ATRIAL AREA: 12.5 CM2
RIGHT VENTRICLE DIASTOLIC BASEL DIMENSION: 3.6 CM
RV TB RVSP: 5 MMHG
RV TISSUE DOPPLER FREE WALL SYSTOLIC VELOCITY 1 (APICAL 4 CHAMBER VIEW): 14.85 CM/S
SINUS: 3.12 CM
STJ: 2.87 CM
TDI LATERAL: 0.14 M/S
TDI SEPTAL: 0.11 M/S
TDI: 0.13 M/S
TRICUSPID ANNULAR PLANE SYSTOLIC EXCURSION: 2.67 CM
TV PEAK GRADIENT: 19 MMHG
TV REST PULMONARY ARTERY PRESSURE: 22 MMHG
Z-SCORE OF LEFT VENTRICULAR DIMENSION IN END DIASTOLE: -2.71
Z-SCORE OF LEFT VENTRICULAR DIMENSION IN END SYSTOLE: -1.32

## 2025-03-07 PROCEDURE — 93010 ELECTROCARDIOGRAM REPORT: CPT | Mod: S$GLB,,, | Performed by: INTERNAL MEDICINE

## 2025-03-07 PROCEDURE — 93306 TTE W/DOPPLER COMPLETE: CPT

## 2025-03-07 PROCEDURE — 93306 TTE W/DOPPLER COMPLETE: CPT | Mod: 26,,, | Performed by: INTERNAL MEDICINE

## 2025-03-11 ENCOUNTER — TELEPHONE (OUTPATIENT)
Dept: PREADMISSION TESTING | Facility: HOSPITAL | Age: 70
End: 2025-03-11
Payer: MEDICARE

## 2025-03-11 PROBLEM — D75.89 MACROCYTOSIS: Status: ACTIVE | Noted: 2025-03-11

## 2025-03-11 PROBLEM — R06.83 SNORING: Status: ACTIVE | Noted: 2025-03-11

## 2025-03-11 NOTE — TELEPHONE ENCOUNTER
----- Message from Kristin Nunn NP sent at 3/11/2025  3:14 PM CDT -----  Hi,Patient's EKG is abnormal- showing infarct, age undetermined. (Possibly injury to heart sometime in the past.) This is her second abnormal EKG.I have placed a referral to cardiology.Thanks,Kristin

## 2025-03-14 ENCOUNTER — LAB VISIT (OUTPATIENT)
Dept: LAB | Facility: HOSPITAL | Age: 70
End: 2025-03-14
Attending: INTERNAL MEDICINE
Payer: MEDICARE

## 2025-03-14 ENCOUNTER — OFFICE VISIT (OUTPATIENT)
Dept: CARDIOLOGY | Facility: CLINIC | Age: 70
End: 2025-03-14
Payer: MEDICARE

## 2025-03-14 VITALS
SYSTOLIC BLOOD PRESSURE: 143 MMHG | DIASTOLIC BLOOD PRESSURE: 82 MMHG | WEIGHT: 236.81 LBS | BODY MASS INDEX: 43.58 KG/M2 | HEART RATE: 65 BPM | HEIGHT: 62 IN | OXYGEN SATURATION: 96 %

## 2025-03-14 DIAGNOSIS — Z01.810 PREOP CARDIOVASCULAR EXAM: Primary | ICD-10-CM

## 2025-03-14 DIAGNOSIS — F11.20 UNCOMPLICATED OPIOID DEPENDENCE: ICD-10-CM

## 2025-03-14 DIAGNOSIS — I83.893 VARICOSE VEINS OF BILATERAL LOWER EXTREMITIES WITH OTHER COMPLICATIONS: ICD-10-CM

## 2025-03-14 DIAGNOSIS — R94.31 ABNORMAL EKG: ICD-10-CM

## 2025-03-14 DIAGNOSIS — Z91.89 CARDIOVASCULAR EVENT RISK: ICD-10-CM

## 2025-03-14 DIAGNOSIS — E88.09 HYPOALBUMINEMIA: ICD-10-CM

## 2025-03-14 DIAGNOSIS — Z78.9 EXCESSIVE CAFFEINE INTAKE: ICD-10-CM

## 2025-03-14 DIAGNOSIS — E66.813 CLASS 3 SEVERE OBESITY DUE TO EXCESS CALORIES WITH SERIOUS COMORBIDITY AND BODY MASS INDEX (BMI) OF 40.0 TO 44.9 IN ADULT: ICD-10-CM

## 2025-03-14 DIAGNOSIS — I10 PRIMARY HYPERTENSION: ICD-10-CM

## 2025-03-14 DIAGNOSIS — E66.01 CLASS 3 SEVERE OBESITY DUE TO EXCESS CALORIES WITH SERIOUS COMORBIDITY AND BODY MASS INDEX (BMI) OF 40.0 TO 44.9 IN ADULT: ICD-10-CM

## 2025-03-14 PROBLEM — I87.2 VENOUS INSUFFICIENCY: Status: ACTIVE | Noted: 2024-11-27

## 2025-03-14 LAB
ALBUMIN/CREAT UR: 13.6 UG/MG (ref 0–30)
CREAT UR-MCNC: 118 MG/DL (ref 15–325)
MICROALBUMIN UR DL<=1MG/L-MCNC: 16 UG/ML

## 2025-03-14 PROCEDURE — 99999 PR PBB SHADOW E&M-EST. PATIENT-LVL IV: CPT | Mod: PBBFAC,,, | Performed by: INTERNAL MEDICINE

## 2025-03-14 PROCEDURE — 82043 UR ALBUMIN QUANTITATIVE: CPT | Performed by: INTERNAL MEDICINE

## 2025-03-14 NOTE — PROGRESS NOTES
Subjective:        Patient ID:  Megha Robb is a 70 y.o. female who presents for evaluation of Establish Care (Pre op)  Referred by      Kristin Nunn NP   PCP: Rahul Carolina MD   Prior cardiologist: Zackary Owen MD, last seen 7/2013  Lives alone, have a pet  Here with daughter-in-law, Lakisha, managed the medical   son, Martinez, outdoor smoker, lives in the back house    Patient is a new patient to me.     Health literacy: medium  Vaccinations: up-to-date, completed COVID, had COVID infection 2023, no residual    Activities:  very active, does house and yard work, no regular exercise, no problem and no limitations  Nicotine: non-smoker  Alcohol: yes, once a month, 1-2 [place]   Illicit drugs: No, on Rx hydrocodone qid for over 7 years.   Cardiac symptoms:  none  Home BP: No  Medication compliance: Yes, on quinapril 40 mg daily  Diet: Regular  Caffeine: How much do you consume a day? 8 cpd , no sleep problem  Labs:   Sodium   Date Value Ref Range Status   03/07/2025 140 136 - 145 mmol/L Final     Potassium   Date Value Ref Range Status   03/07/2025 3.9 3.5 - 5.1 mmol/L Final     Chloride   Date Value Ref Range Status   03/07/2025 105 95 - 110 mmol/L Final     CO2   Date Value Ref Range Status   03/07/2025 30 (H) 23 - 29 mmol/L Final     Glucose   Date Value Ref Range Status   03/07/2025 76 70 - 110 mg/dL Final     BUN   Date Value Ref Range Status   03/07/2025 11 8 - 23 mg/dL Final     Creatinine   Date Value Ref Range Status   03/07/2025 0.7 0.5 - 1.4 mg/dL Final     Calcium   Date Value Ref Range Status   03/07/2025 8.9 8.7 - 10.5 mg/dL Final     Total Protein   Date Value Ref Range Status   12/05/2024 7.1 6.0 - 8.4 g/dL Final     Albumin   Date Value Ref Range Status   12/05/2024 3.4 (L) 3.5 - 5.2 g/dL Final     Total Bilirubin   Date Value Ref Range Status   12/05/2024 0.3 0.1 - 1.0 mg/dL Final     Comment:     For infants and newborns, interpretation of results should be based  on gestational  "age, weight and in agreement with clinical  observations.    Premature Infant recommended reference ranges:  Up to 24 hours.............<8.0 mg/dL  Up to 48 hours............<12.0 mg/dL  3-5 days..................<15.0 mg/dL  6-29 days.................<15.0 mg/dL       Alkaline Phosphatase   Date Value Ref Range Status   12/05/2024 91 55 - 135 U/L Final     AST   Date Value Ref Range Status   12/05/2024 26 10 - 40 U/L Final     ALT   Date Value Ref Range Status   12/05/2024 12 10 - 44 U/L Final     Anion Gap   Date Value Ref Range Status   03/07/2025 5 (L) 8 - 16 mmol/L Final     eGFR   Date Value Ref Range Status   03/07/2025 >60.0 >60 mL/min/1.73 m^2 Final      Lab Results   Component Value Date    WBC 5.33 03/07/2025    RBC 4.14 03/07/2025    HGB 13.0 03/07/2025    HCT 42.4 03/07/2025     (H) 03/07/2025    MCH 31.4 (H) 03/07/2025    MCHC 30.7 (L) 03/07/2025    RDW 13.2 03/07/2025     03/07/2025    MPV 9.3 03/07/2025    GRAN 3.2 03/07/2025    GRAN 59.8 03/07/2025    LYMPH 1.2 03/07/2025    LYMPH 21.8 03/07/2025    MONO 0.6 03/07/2025    MONO 11.4 03/07/2025    EOS 0.3 03/07/2025    BASO 0.04 03/07/2025    EOSINOPHIL 6.0 03/07/2025    BASOPHIL 0.8 03/07/2025      Lab Results   Component Value Date    TSH 1.8 03/29/2005     No results found for: "LABA1C", "HGBA1C"     Lab Results   Component Value Date    CHOL 196 07/10/2023    CHOL 167 12/08/2021    CHOL 168 10/14/2020     Lab Results   Component Value Date    HDL 63 07/10/2023    HDL 55 12/08/2021    HDL 55 10/14/2020     Lab Results   Component Value Date    LDLCALC 113.2 07/10/2023    LDLCALC 96.2 12/08/2021    LDLCALC 95.2 10/14/2020     Lab Results   Component Value Date    TRIG 99 07/10/2023    TRIG 79 12/08/2021    TRIG 89 10/14/2020       Lab Results   Component Value Date    CHOLHDL 32.1 07/10/2023    CHOLHDL 32.9 12/08/2021    CHOLHDL 32.7 10/14/2020       Last Echo:   Results for orders placed during the hospital encounter of " 03/07/25    Echo    Interpretation Summary    Left Ventricle: The left ventricle is normal in size. Normal wall thickness. Normal wall motion. There is normal systolic function with a visually estimated ejection fraction of 60 - 65%. There is normal diastolic function. Average E/e' ratio is 6.    Right Ventricle: The right ventricle is normal in size. Wall thickness is normal. Systolic function is normal.    Aortic Valve: There is mild aortic valve sclerosis. There is annular calcification present. There is no significant stenosis. Aortic valve peak velocity is 1.8 m/s. Mean gradient is 6 mmHg.    Pulmonary Artery: The estimated pulmonary artery systolic pressure is 22 mmHg.    IVC/SVC: Normal venous pressure at 3 mmHg.    Last stress test:   No results found for this or any previous visit.     Cardiovascular angiogram: none  ECG:   Results for orders placed or performed during the hospital encounter of 03/07/25   EKG 12-lead    Collection Time: 03/07/25  8:09 AM   Result Value Ref Range    QRS Duration 100 ms    [facility]HS QTC Calculation 429 ms    Narrative    Test Reason : I10,R01.1,    Vent. Rate :  68 BPM     Atrial Rate :  68 BPM     P-R Int : 182 ms          QRS Dur : 100 ms      QT Int : 404 ms       P-R-T Axes :   6  -2   0 degrees    QTcB Int : 429 ms    Normal sinus rhythm  Minimal voltage criteria for LVH, may be normal variant ( R in aVL )  Inferior infarct ,age undetermined  Nonspecific T wave abnormality  Abnormal ECG  When compared with ECG of 19-Feb-2025 15:59,  Nonspecific T wave abnormality now evident in Lateral leads  Normal lead placement across the precordium Now present  Confirmed by Mil Mejia ([id number]) on 3/7/2025 12:46:19 PM    Referred By: ADARSH VILLARREAL           Confirmed By: Mil Mejia      Fundoscopic exam: due    WF referred for abnormal ECG suggesting prior MI. History of HTN and obesity. Father had history of MI but unknown age. Overall ASCVD 10-year event risk of 13.3%, not on statin. Is  "active without problem.    CXR 11/2024 - Stable cardiomediastinal silhouette.  Aortic calcification.  Lungs demonstrate diffuse coarsened interstitial attenuation, grossly similar to prior exam.  Findings could represent pneumonitis, edema, or chronic interstitial lung disease.  CT - The abdominal aorta is normal in course and caliber mild atherosclerotic calcification.   There is a moderate-sized hiatal hernia.   visualized lung bases demonstrate interstitial opacities as well as mild patchy airspace opacities although overall improved compared to prior chest CT of 03/25/2024.  Findings may reflect interstitial lung disease.     US LE veins 12/2024 - Negative for DVT throughout the right lower extremity veins.    Kristin Nunn, NP, noted 3/3/2025 "70 y.o. female  who presents today for a preoperative evaluation in preparation for bilateral breast mammoplasty.  Surgery is indicated for hypertrophy of breast.      Estimated risk of cardiac complications after non-cardiac surgery using the Revised Cardiac Risk Index for Preoperative risk is 3.9 %  ARISCAT (Canet) risk index: Intermediate: 13.3% risk of post-op pulmonary complications.  American Society of Anesthesiologists Physical Status classification (ASA): 3    VS - /57 Important   (BP Location: Right arm, Patient Position: Sitting)     Pulse 85     Temp 98 °F (36.7 °C) (Oral)     Ht 5' 2" (1.575 m)     Wt 100 kg (220 lb 7.4 oz)     SpO2 95%     BMI 40.32 kg/m²     BSA 2.09 m²"    Zackary Owen MD  noted 7/2013 "57 y.o. female who presents for follow up.  She underwent declotting of thrombosed varicose veins. A lot better but still some other areas of clotting  SHe had EVLT of the left and right GSV and SSV 2 month ago. Restless legs and cramps are resolved.   STill she has significant varicose veins in the right and the left thighs and calves.     1. Leg pain: s/p, bilateral EVLT of theGSV and SSV, legs much better   2. Varicose veins of lower " extremities with other complications with significant reflux of the right and the left GSV and SSV   3. HTN (hypertension)   4.  S/p declotting of thrombosed varicose veins after EVLT and slerotherapy      Review of Systems   Constitutional: Negative for diaphoresis, fever, malaise/fatigue, night sweats and weight gain.   HENT:  Negative for nosebleeds and tinnitus.    Eyes:  Negative for visual disturbance.   Cardiovascular:  Positive for leg swelling. Negative for chest pain, claudication, cyanosis, dyspnea on exertion, irregular heartbeat, near-syncope, orthopnea, palpitations and paroxysmal nocturnal dyspnea.   Respiratory:  Negative for cough, shortness of breath, sleep disturbances due to breathing, snoring and wheezing.         [place] score 3, awaken refreshed.   Endocrine: Negative for polydipsia and polyuria.   Hematologic/Lymphatic: Does not bruise/bleed easily.   Skin:  Negative for color change, flushing, nail changes, poor wound healing and suspicious lesions.   Musculoskeletal:  Positive for arthritis, back pain, joint pain and myalgias. Negative for falls, gout, joint swelling, muscle cramps and muscle weakness.   Gastrointestinal:  Negative for heartburn, hematemesis, hematochezia, melena and nausea.   Neurological:  Negative for disturbances in coordination, excessive daytime sleepiness, dizziness, focal weakness, headaches, light-headedness, loss of balance, numbness, vertigo and weakness.   Psychiatric/Behavioral:  Negative for depression and substance abuse. The patient does not have insomnia and is not nervous/anxious.         Objective:    Physical Exam  Constitutional:       Appearance: She is well-developed.      Comments: RA O2 sat 96%  Orthostatic BP: sitting 134/73, standing 143/82   HENT:      Head: Normocephalic.   Eyes:      Conjunctiva/sclera: Conjunctivae normal.      Pupils: Pupils are equal, round, and reactive to light.   Neck:      Thyroid: No thyromegaly.      Vascular: No JVD.  "  Cardiovascular:      Rate and Rhythm: Normal rate and regular rhythm.      Pulses: Intact distal pulses.           Carotid pulses are 1+ on the right side and 1+ on the left side.       Radial pulses are 1+ on the right side and 1+ on the left side.        Dorsalis pedis pulses are 1+ on the right side and 1+ on the left side.        Posterior tibial pulses are 1+ on the right side and 1+ on the left side.      Heart sounds: Normal heart sounds. No murmur heard.     No friction rub. No gallop.   Pulmonary:      Effort: Pulmonary effort is normal.      Breath sounds: Normal breath sounds. No rales.   Chest:      Chest wall: No tenderness.   Abdominal:      General: Bowel sounds are normal.      Palpations: Abdomen is soft.      Tenderness: There is no abdominal tenderness.      Comments: Waist 47"   Musculoskeletal:         General: Normal range of motion.      Cervical back: Normal range of motion and neck supple.   Lymphadenopathy:      Cervical: No cervical adenopathy.   Skin:     General: Skin is warm and dry.      Findings: No rash.   Neurological:      Mental Status: She is alert and oriented to person, place, and time.           Assessment:       1. Preop cardiovascular exam    2. Abnormal EKG    3. Varicose veins of lower extremities with other complications    4. Uncomplicated opioid dependence, on hydrocodone qid since 2015    5. Primary hypertension    6. Excessive caffeine intake    7. Hypoalbuminemia    8. Cardiovascular event risk, ASCVD 10-year risk 13.3%, 2023    9. Class 3 severe obesity due to excess calories with serious comorbidity and body mass index (BMI) of 40.0 to 44.9 in adult         Plan:       Megha was seen today for establish care.    Diagnoses and all orders for this visit:    Preop cardiovascular exam  -     IN OFFICE EKG 12-LEAD (to Muse)  -     Stress Echo Which stress agent will be used? Treadmill Exercise; Color Flow Doppler? Yes; Future    Abnormal EKG  -     Ambulatory " referral/consult to Cardiology  -     IN OFFICE EKG 12-LEAD (to Muse)  -     Stress Echo Which stress agent will be used? Treadmill Exercise; Color Flow Doppler? Yes; Future    Varicose veins of lower extremities with other complications    Uncomplicated opioid dependence, on hydrocodone qid since 2015    Primary hypertension  -     Stress Echo Which stress agent will be used? Treadmill Exercise; Color Flow Doppler? Yes; Future  -     Microalbumin/Creatinine Ratio, Urine; Future    Excessive caffeine intake    Hypoalbuminemia  -     Microalbumin/Creatinine Ratio, Urine; Future    Cardiovascular event risk, ASCVD 10-year risk 13.3%, 2023    Class 3 severe obesity due to excess calories with serious comorbidity and body mass index (BMI) of 40.0 to 44.9 in adult     - All medical issues reviewed, continue current Rx. Decline [facility] for HTN  - Warning signs of MI and stroke given, if symptoms last more than 5 minutes, stop immediately and call 911, then chew 2-4 low-dose ASA (81 mg).   - Consider use of Potassium chloride salt substitute, Bosch NU-Salt. \  - Consider use of some dietary protein supplement.   - CV status and all medications reviewed, patient acknowledge good understanding.  - Recommend healthy living: no nicotine, moderate alcohol, healthy diet and regular exercise aiming for fitness, restorative sleep and weight control  - Need good exercise program, 4 key elements: 1. Aerobic (walking, swimming, dancing, jogging, biking, etc, 2. Muscle strengthening / resistance exercise, need to do 2-3 times weekly, 3. Stretching daily, good stretch takes a whole  total minute. 4. Balance exercise daily.   - Instruction for Mediterranean, high potassium diet and heart healthy exercise given.  - Check home blood pressure, 2 days weekly, do 2 readings within 5 minutes in AM and PM, keep log for review. Target resting BP is less than 130/80.   - Weigh twice weekly, try to lose 1-2 lbs per week. Target weight  loss of 5%-10%.  - Highly recommend 30-60 minutes of exercise / activities daily, can have Sunday off, with 2-3 sessions of muscle strengthening weekly. A  would be very helpful.  - Recommend at least annual cardiovascular evaluation in view of patient's significant risk factors.  - Phone review / encourage use of [facility], No [facility], clearance after results.  - The tech support at [facility] is available 5 days a week, from 9 to 5, at [phone].        Total time spend including review of record prior to face-to-face visit is 60 minutes. Greater than 50% of the time was spent in counseling and coordination of care. The above assessment and plan have been discussed at length. Referring provider's note reviewed. Labs and procedure over the last 6 months reviewed. Problem List updated. Asked to bring in all active medications / pills bottles with next visit. Will send note to referring / PCP.

## 2025-03-14 NOTE — PATIENT INSTRUCTIONS
Recommended Mediterranean dietEating Heart-Healthy Food: Using the DASH Plan  Eating for your heart doesnt have to be hard or boring. You just need to know how to make healthier choices. The DASH eating plan has been developed to help you do just that. DASH stands for Dietary Approaches to Stop Hypertension. It is a plan that has been proven to be healthier for your heart and to lower your risk for high blood pressure. It can also help lower your risk for cancer, heart disease, osteoporosis, and diabetes.  Choosing from Each Food Group  Choose foods from each of the food groups below each day. Try to get the recommended number of servings for each food group. The serving numbers are based on a diet of 2,000 calories a day. Talk to your doctor if youre unsure about your calorie needs.  Grains   Servings: 7-8 a day  A serving is:  1 slice bread  1 ounce dry cereal  half a cup cooked rice or pasta  Best choices: Whole grains and any grains high in fiber.  Vegetables   Servings: 4-5 a day  A serving is:  1 cup raw leafy vegetable  Half a cup cooked vegetable  Three-quarter cup vegetable juice  Best choices: Fresh or frozen vegetable prepared without too much added salt or fat.    Fruits   Servings: 4-5 a day  A serving is:  Three-quarter cup fruit juice  1 medium fruit  One-quarter cup dried fruit  One-half cup fresh, frozen, or canned fruit  Best choices: A variety of fresh fruits of different colors. Whole fruits are a much better choice than fruit juices.  Low-fat or Fat Free Dairy   Servings: 2-3 a day  A serving is:  8 ounces milk  1 cup yogurt  One and a half ounces cheese  Best choices: Skim or 1% milk, low-fat or fat free yogurt or buttermilk, and low-fat cheeses.       Meat, Poultry, Fish   Servings: 2 or fewer a day  A serving is:  3 ounces cooked meat, poultry, or fish  Best choices: Lean meats and fish. Trim away visible fat. Broil, roast, or boil instead of frying. Remove skin from poultry before eating.   Nuts, Seeds, Beans   Servings: 4-5 a week  A serving is:  One third cup nuts (or one and a half ounces)  2 tablespoons sunflower seeds  Half a cup cooked beans  Best choices: Dry roasted nuts with no salt added, lentils, kidney beans, garbanzo beans, and whole talavera beans.    Fats and Oils   Servings: 2 a day  A serving is:  1 teaspoon vegetable oil  1 teaspoon soft margarine  1 tablespoon low-fat mayonnaise  1 teaspoon regular mayonnaise  2 tablespoons light salad dressing  1 tablespoon regular salad dressing  Best choices: Monounsaturated and polyunsaturated fats such as olive, canola, or safflower oil.  Sweets   Servings: 5 a week or fewer  A serving is:  1 tablespoon sugar, maple syrup, or honey  1 tablespoon jam or jelly  1 half-ounce jelly beans (about 15)  8 ounces lemonade  Best choices: Dried fruit can be a satisfying sweet. Choose low-fat sweets when possible. And watch your serving sizes!       Aerobic Exercise for a Healthy Heart  Exercise is a lot more than an energy booster and a stress reliever. It also strengthens your heart muscle, lowers your blood pressure and blood cholesterol, and burns calories.      Remember, some activity is better than none.     Choose an Aerobic Activity  Choose a nonstop activity that makes your heart and lungs work harder than they do when you rest or walk normally. This aerobic exercise can improve the way your heart and other muscles use oxygen. Make it fun by exercising with a friend and choosing an activity you enjoy. Here are some ideas:  Walking  Swimming  Bicycling  Stair climbing  Dancing  Jogging  Exercise Regularly  If you havent been exercising regularly,  get your doctors okay first. Then start slowly.  Here are some tips:  Begin exercising 3 times a week for 5-10 minutes at a time.  When you feel comfortable, add a few minutes each week.  Slowly build up to exercising 3-4 times each week for 20-40 minutes. Aim for a total of 150 or more minutes a  week.  Be sure to carry your nitroglycerin with you when you exercise.  If you get angina when youre exercising, stop what youre doing, take your nitroglycerin, and call your doctor.  © 6039-0010 [employer]ley[employer] Vazquez, [address] [place] [address], [place] [address]. All rights reserved. This information is not intended as a substitute for professional medical care. Always follow your healthcare professional's instructions.    Losing Weight (Cardiovascular)  Excess weight is a major risk factor for heart disease. Losing weight may help keep your arteries open so that your heart can get the oxygen-rich blood it needs. Weight loss can also help lower your blood pressure and reduce your risk for diabetes. All in all, losing weight makes you healthier.          Exercise with a friend. When activity is fun, you're more likely to stick with it.        Calories and Weight Loss  Calories are the fuel your body burns for energy. You get the calories you need from the food you eat. For healthy weight loss, women should eat at least 1,200 calories a day, men at least 1,500.    When you eat more calories than you need, your body stores the extra calories as fat. One pound of fat equals 3,500 calories.    To lose weight, try to burn 500 calories a day more than you eat. To do this, eat 250 calories less each day. Add activity to burn the other 250 calories. Walking 21/2 miles burns about 250 calories.    Eat a variety of healthy foods. Its the best way to make calories count.     Tips for losing weight:  Drink 8 to 10 glasses of water a day.    Dont skip meals. Instead, eat smaller portions.       Brisk Activity Is Best  Brisk activity gets your heart pumping faster. It makes your heart healthier. Its also the best way to burn calories. In fact, your body may keep burning calories for hours after you stop a brisk activity.    Begin by walking 10 minutes most days.    Add more time and speed to your walk. Build up as you feel  able.    Try to walk briskly at least 30 minutes most days. If needed, you can break this into 2 shorter sessions.     Check off the ideas below that you could try to make your day more active:    Take the stairs instead of the elevator.    Park your car farther away and walk.    Ride a bike to work or to the store.    Walk laps around the mall.

## 2025-03-24 ENCOUNTER — HOSPITAL ENCOUNTER (OUTPATIENT)
Dept: CARDIOLOGY | Facility: HOSPITAL | Age: 70
Discharge: HOME | End: 2025-03-24
Attending: INTERNAL MEDICINE
Payer: MEDICARE

## 2025-03-24 VITALS — BODY MASS INDEX: 43.43 KG/M2 | WEIGHT: 236 LBS | HEIGHT: 62 IN

## 2025-03-24 DIAGNOSIS — R94.31 ABNORMAL EKG: ICD-10-CM

## 2025-03-24 DIAGNOSIS — Z01.810 PREOP CARDIOVASCULAR EXAM: ICD-10-CM

## 2025-03-24 DIAGNOSIS — I10 PRIMARY HYPERTENSION: ICD-10-CM

## 2025-03-24 LAB
AORTIC ROOT ANNULUS: 2.97 CM
AORTIC VALVE CUSP SEPERATION: 1.44 CM
ASCENDING AORTA: 2.26 CM
AV INDEX (PROSTH): 0.56
AV MEAN GRADIENT: 5 MMHG
AV PEAK GRADIENT: 8 MMHG
AV VALVE AREA BY VELOCITY RATIO: 1.6 CM²
AV VALVE AREA: 1.4 CM²
AV VELOCITY RATIO: 0.64
BSA FOR ECHO PROCEDURE: 2.16 M2
CV ECHO LV RWT: 0.51 CM
CV STRESS BASE HR: 63 BPM
DIASTOLIC BLOOD PRESSURE: 64 MMHG
DOP CALC AO PEAK VEL: 1.4 M/S
DOP CALC AO VTI: 36.9 CM
DOP CALC LVOT AREA: 2.5 CM2
DOP CALC LVOT DIAMETER: 1.8 CM
DOP CALC LVOT PEAK VEL: 0.9 M/S
DOP CALC LVOT STROKE VOLUME: 52.4 CM3
DOP CALC MV VTI: 22.9 CM
DOP CALCLVOT PEAK VEL VTI: 20.6 CM
E WAVE DECELERATION TIME: 185 MSEC
E/A RATIO: 1.29
E/E' RATIO: 7 M/S
ECHO LV POSTERIOR WALL: 1 CM (ref 0.6–1.1)
EJECTION FRACTION: 60 %
FRACTIONAL SHORTENING: 30.8 % (ref 28–44)
INTERVENTRICULAR SEPTUM: 1.2 CM (ref 0.6–1.1)
IVC DIAMETER: 1.8 CM
LA MAJOR: 4.2 CM
LA MINOR: 3.5 CM
LEFT ATRIUM AREA SYSTOLIC (APICAL 2 CHAMBER): 7.78 CM2
LEFT ATRIUM AREA SYSTOLIC (APICAL 4 CHAMBER): 11.05 CM2
LEFT ATRIUM SIZE: 3.6 CM
LEFT ATRIUM VOLUME INDEX MOD: 8 ML/M2
LEFT ATRIUM VOLUME MOD: 17 ML
LEFT INTERNAL DIMENSION IN SYSTOLE: 2.7 CM (ref 2.1–4)
LEFT VENTRICLE DIASTOLIC VOLUME INDEX: 32.2 ML/M2
LEFT VENTRICLE DIASTOLIC VOLUME: 66 ML
LEFT VENTRICLE END SYSTOLIC VOLUME APICAL 2 CHAMBER: 12.09 ML
LEFT VENTRICLE END SYSTOLIC VOLUME APICAL 4 CHAMBER: 24.16 ML
LEFT VENTRICLE MASS INDEX: 68.3 G/M2
LEFT VENTRICLE SYSTOLIC VOLUME INDEX: 13.2 ML/M2
LEFT VENTRICLE SYSTOLIC VOLUME: 27 ML
LEFT VENTRICULAR INTERNAL DIMENSION IN DIASTOLE: 3.9 CM (ref 3.5–6)
LEFT VENTRICULAR MASS: 140.1 G
LV LATERAL E/E' RATIO: 6.3 M/S
LV SEPTAL E/E' RATIO: 8.3 M/S
LVED V (TEICH): 65.65 ML
LVES V (TEICH): 26.76 ML
LVOT MG: 1.73 MMHG
LVOT MV: 0.62 CM/S
MV MEAN GRADIENT: 1 MMHG
MV PEAK A VEL: 0.58 M/S
MV PEAK E VEL: 0.75 M/S
MV PEAK GRADIENT: 2 MMHG
MV STENOSIS PRESSURE HALF TIME: 52.9 MS
MV VALVE AREA BY CONTINUITY EQUATION: 2.29 CM2
MV VALVE AREA P 1/2 METHOD: 4.16 CM2
OHS CV CPX 85 PERCENT MAX PREDICTED HEART RATE MALE: 128
OHS CV CPX MAX PREDICTED HEART RATE: 150
OHS CV CPX PATIENT IS FEMALE: 1
OHS CV CPX PATIENT IS MALE: 0
OHS CV CPX PEAK DIASTOLIC BLOOD PRESSURE: 64 MMHG
OHS CV CPX PEAK HEAR RATE: 131 BPM
OHS CV CPX PEAK RATE PRESSURE PRODUCT: ABNORMAL
OHS CV CPX PEAK SYSTOLIC BLOOD PRESSURE: 153 MMHG
OHS CV CPX PERCENT MAX PREDICTED HEART RATE ACHIEVED: 91
OHS CV CPX RATE PRESSURE PRODUCT PRESENTING: 9639
OHS CV INITIAL DOSE: 10 MCG/KG/MIN
OHS CV PEAK DOSE: 50 MCG/KG/MIN
OHS CV RV/LV RATIO: 0.74 CM
PISA MRMAX VEL: 2.65 M/S
PISA TR MAX VEL: 2.3 M/S
PV MV: 0.59 M/S
PV PEAK GRADIENT: 2 MMHG
PV PEAK VELOCITY: 0.78 M/S
RA MAJOR: 3.9 CM
RA PRESSURE ESTIMATED: 3 MMHG
RA WIDTH: 2.86 CM
RIGHT VENTRICLE DIASTOLIC BASEL DIMENSION: 2.9 CM
RIGHT VENTRICLE DIASTOLIC LENGTH: 5.4 CM
RIGHT VENTRICLE DIASTOLIC MID DIMENSION: 1.9 CM
RIGHT VENTRICULAR END-DIASTOLIC DIMENSION: 2.86 CM
RIGHT VENTRICULAR LENGTH IN DIASTOLE (APICAL 4-CHAMBER VIEW): 5.39 CM
RV MID DIAMA: 1.92 CM
RV TB RVSP: 5 MMHG
SINUS: 2.72 CM
STJ: 2.64 CM
SYSTOLIC BLOOD PRESSURE: 153 MMHG
TDI LATERAL: 0.12 M/S
TDI SEPTAL: 0.09 M/S
TDI: 0.11 M/S
TR MAX PG: 21 MMHG
TRICUSPID ANNULAR PLANE SYSTOLIC EXCURSION: 2.5 CM
TRICUSPID VALVE PEAK A WAVE VELOCITY: 0.45 M/S
TV PEAK E VEL: 0.5 M/S
TV REST PULMONARY ARTERY PRESSURE: 24 MMHG
Z-SCORE OF LEFT VENTRICULAR DIMENSION IN END DIASTOLE: -4.55
Z-SCORE OF LEFT VENTRICULAR DIMENSION IN END SYSTOLE: -2.63

## 2025-03-24 PROCEDURE — 93351 STRESS TTE COMPLETE: CPT | Mod: 26,,, | Performed by: INTERNAL MEDICINE

## 2025-03-24 PROCEDURE — 93320 DOPPLER ECHO COMPLETE: CPT | Mod: 26,,, | Performed by: INTERNAL MEDICINE

## 2025-03-24 PROCEDURE — 93320 DOPPLER ECHO COMPLETE: CPT

## 2025-03-24 PROCEDURE — 93325 DOPPLER ECHO COLOR FLOW MAPG: CPT | Mod: 26,,, | Performed by: INTERNAL MEDICINE

## 2025-03-24 PROCEDURE — 63600175 PHARM REV CODE 636 W HCPCS: Performed by: INTERNAL MEDICINE

## 2025-03-24 RX ORDER — DOBUTAMINE HYDROCHLORIDE 400 MG/100ML
10-50 INJECTION INTRAVENOUS CONTINUOUS
Status: DISCONTINUED | OUTPATIENT
Start: 2025-03-24 | End: 2025-03-25 | Stop reason: HOSPADM

## 2025-03-24 RX ADMIN — DOBUTAMINE HYDROCHLORIDE 30 MCG/KG/MIN: 400 INJECTION INTRAVENOUS at 02:03

## 2025-03-24 RX ADMIN — DOBUTAMINE HYDROCHLORIDE 10 MCG/KG/MIN: 400 INJECTION INTRAVENOUS at 01:03

## 2025-03-25 ENCOUNTER — TELEPHONE (OUTPATIENT)
Dept: INTERNAL MEDICINE | Facility: CLINIC | Age: 70
End: 2025-03-25
Payer: MEDICARE

## 2025-03-25 ENCOUNTER — RESULTS FOLLOW-UP (OUTPATIENT)
Dept: INTERNAL MEDICINE | Facility: CLINIC | Age: 70
End: 2025-03-25

## 2025-03-25 NOTE — TELEPHONE ENCOUNTER
----- Message from Martinez Ingram MD sent at 3/24/2025  3:47 PM CDT -----  Clear for Surgery and anesthesia with acceptable risk from the cardiac standpoint. Will be at increase risk for complications secondary to patient's co-morbidities. Thanks    Dr. Ingram  ----- Message -----  From: Martinez Ingram MD  Sent: 3/24/2025   3:42 PM CDT  To: Martinez Ingram MD

## 2025-03-31 ENCOUNTER — ANESTHESIA EVENT (OUTPATIENT)
Dept: SURGERY | Facility: HOSPITAL | Age: 70
End: 2025-03-31
Payer: MEDICARE

## 2025-03-31 ENCOUNTER — TELEPHONE (OUTPATIENT)
Dept: PLASTIC SURGERY | Facility: CLINIC | Age: 70
End: 2025-03-31
Payer: MEDICARE

## 2025-03-31 NOTE — TELEPHONE ENCOUNTER
Contacted pt to discuss surgery arrival time for Tuesday, April 1.  Pt advised she is the third case of the day and should arrive on the 2nd floor, [facility] for 10:30a.  Discussed a few quick reminders : 1. Please do not eat or drink anything after midnight, 2. Please take a complete shower or bath with Hibiclense or Dial soap, 3. Wear comfortable clothing (something easy to get in and out of) and 4. Secure a responsible person to get her to surgery in the morning and back home afterwards.     Pt verbalized understanding.

## 2025-04-01 ENCOUNTER — HOSPITAL ENCOUNTER (OUTPATIENT)
Facility: HOSPITAL | Age: 70
Discharge: HOME | End: 2025-04-01
Attending: SURGERY | Admitting: SURGERY
Payer: MEDICARE

## 2025-04-01 ENCOUNTER — ANESTHESIA (OUTPATIENT)
Dept: SURGERY | Facility: HOSPITAL | Age: 70
End: 2025-04-01
Payer: MEDICARE

## 2025-04-01 VITALS
RESPIRATION RATE: 13 BRPM | HEART RATE: 63 BPM | HEIGHT: 62 IN | WEIGHT: 239.63 LBS | BODY MASS INDEX: 44.1 KG/M2 | OXYGEN SATURATION: 95 % | TEMPERATURE: 99 F | DIASTOLIC BLOOD PRESSURE: 87 MMHG | SYSTOLIC BLOOD PRESSURE: 145 MMHG

## 2025-04-01 DIAGNOSIS — G89.29 CHRONIC PAIN IN RIGHT SHOULDER: ICD-10-CM

## 2025-04-01 DIAGNOSIS — N62 HYPERTROPHY OF BREAST: ICD-10-CM

## 2025-04-01 DIAGNOSIS — M25.512 CHRONIC PAIN IN LEFT SHOULDER: ICD-10-CM

## 2025-04-01 DIAGNOSIS — M54.2 CERVICALGIA: ICD-10-CM

## 2025-04-01 DIAGNOSIS — M25.511 CHRONIC PAIN IN RIGHT SHOULDER: ICD-10-CM

## 2025-04-01 DIAGNOSIS — G89.29 CHRONIC PAIN IN LEFT SHOULDER: ICD-10-CM

## 2025-04-01 DIAGNOSIS — Z01.818 PREOPERATIVE TESTING: Primary | ICD-10-CM

## 2025-04-01 PROCEDURE — 36000707: Performed by: SURGERY

## 2025-04-01 PROCEDURE — 71000044 HC DOSC ROUTINE RECOVERY FIRST HOUR: Performed by: SURGERY

## 2025-04-01 PROCEDURE — C1729 CATH, DRAINAGE: HCPCS | Performed by: SURGERY

## 2025-04-01 PROCEDURE — 76942 ECHO GUIDE FOR BIOPSY: CPT

## 2025-04-01 PROCEDURE — 71000016 HC POSTOP RECOV ADDL HR: Performed by: SURGERY

## 2025-04-01 PROCEDURE — 36000706: Performed by: SURGERY

## 2025-04-01 PROCEDURE — 64468 THRC FASCIAL PLN BLK BI NJX: CPT | Mod: 59,,, | Performed by: ANESTHESIOLOGY

## 2025-04-01 PROCEDURE — 63600175 PHARM REV CODE 636 W HCPCS: Performed by: STUDENT IN AN ORGANIZED HEALTH CARE EDUCATION/TRAINING PROGRAM

## 2025-04-01 PROCEDURE — 63600175 PHARM REV CODE 636 W HCPCS: Performed by: NURSE ANESTHETIST, CERTIFIED REGISTERED

## 2025-04-01 PROCEDURE — 19318 BREAST REDUCTION: CPT | Mod: 50,,, | Performed by: SURGERY

## 2025-04-01 PROCEDURE — D9220A PRA ANESTHESIA: Mod: ANES,,, | Performed by: STUDENT IN AN ORGANIZED HEALTH CARE EDUCATION/TRAINING PROGRAM

## 2025-04-01 PROCEDURE — 63600175 PHARM REV CODE 636 W HCPCS: Performed by: SURGERY

## 2025-04-01 PROCEDURE — 88305 TISSUE EXAM BY PATHOLOGIST: CPT | Mod: 26,,, | Performed by: STUDENT IN AN ORGANIZED HEALTH CARE EDUCATION/TRAINING PROGRAM

## 2025-04-01 PROCEDURE — 25000003 PHARM REV CODE 250: Performed by: NURSE ANESTHETIST, CERTIFIED REGISTERED

## 2025-04-01 PROCEDURE — 25000003 PHARM REV CODE 250: Performed by: SURGERY

## 2025-04-01 PROCEDURE — 15839 EXC EXCESSIVE SKN OTHER AREA: CPT | Mod: 51,,, | Performed by: SURGERY

## 2025-04-01 PROCEDURE — 71000015 HC POSTOP RECOV 1ST HR: Performed by: SURGERY

## 2025-04-01 PROCEDURE — 63600175 PHARM REV CODE 636 W HCPCS: Performed by: ANESTHESIOLOGY

## 2025-04-01 PROCEDURE — 88305 TISSUE EXAM BY PATHOLOGIST: CPT | Mod: TC,91 | Performed by: SURGERY

## 2025-04-01 PROCEDURE — 25000003 PHARM REV CODE 250: Performed by: STUDENT IN AN ORGANIZED HEALTH CARE EDUCATION/TRAINING PROGRAM

## 2025-04-01 PROCEDURE — 37000008 HC ANESTHESIA 1ST 15 MINUTES: Performed by: SURGERY

## 2025-04-01 PROCEDURE — 27201423 OPTIME MED/SURG SUP & DEVICES STERILE SUPPLY: Performed by: SURGERY

## 2025-04-01 PROCEDURE — D9220A PRA ANESTHESIA: Mod: CRNA,,, | Performed by: NURSE ANESTHETIST, CERTIFIED REGISTERED

## 2025-04-01 PROCEDURE — 37000009 HC ANESTHESIA EA ADD 15 MINS: Performed by: SURGERY

## 2025-04-01 RX ORDER — FENTANYL CITRATE 50 UG/ML
25-200 INJECTION, SOLUTION INTRAMUSCULAR; INTRAVENOUS
Status: DISCONTINUED | OUTPATIENT
Start: 2025-04-01 | End: 2025-04-01 | Stop reason: HOSPADM

## 2025-04-01 RX ORDER — DEXMEDETOMIDINE HYDROCHLORIDE 100 UG/ML
INJECTION, SOLUTION INTRAVENOUS
Status: DISCONTINUED | OUTPATIENT
Start: 2025-04-01 | End: 2025-04-01

## 2025-04-01 RX ORDER — PROPOFOL 10 MG/ML
VIAL (ML) INTRAVENOUS
Status: DISCONTINUED | OUTPATIENT
Start: 2025-04-01 | End: 2025-04-01

## 2025-04-01 RX ORDER — HEPARIN SODIUM 5000 [USP'U]/ML
5000 INJECTION, SOLUTION INTRAVENOUS; SUBCUTANEOUS ONCE
Status: COMPLETED | OUTPATIENT
Start: 2025-04-01 | End: 2025-04-01

## 2025-04-01 RX ORDER — OXYCODONE AND ACETAMINOPHEN 5; 325 MG/1; MG/1
1 TABLET ORAL EVERY 4 HOURS PRN
Qty: 28 TABLET | Refills: 0 | Status: SHIPPED | OUTPATIENT
Start: 2025-04-01 | End: 2025-04-08

## 2025-04-01 RX ORDER — CEFAZOLIN SODIUM 1 G/3ML
INJECTION, POWDER, FOR SOLUTION INTRAMUSCULAR; INTRAVENOUS
Status: DISCONTINUED | OUTPATIENT
Start: 2025-04-01 | End: 2025-04-01

## 2025-04-01 RX ORDER — HALOPERIDOL LACTATE 5 MG/ML
0.5 INJECTION, SOLUTION INTRAMUSCULAR EVERY 10 MIN PRN
Status: DISCONTINUED | OUTPATIENT
Start: 2025-04-01 | End: 2025-04-01 | Stop reason: HOSPADM

## 2025-04-01 RX ORDER — LIDOCAINE HYDROCHLORIDE 20 MG/ML
INJECTION, SOLUTION EPIDURAL; INFILTRATION; INTRACAUDAL; PERINEURAL
Status: DISCONTINUED | OUTPATIENT
Start: 2025-04-01 | End: 2025-04-01

## 2025-04-01 RX ORDER — ONDANSETRON 4 MG/1
4 TABLET, ORALLY DISINTEGRATING ORAL EVERY 8 HOURS PRN
Qty: 20 TABLET | Refills: 0 | Status: SHIPPED | OUTPATIENT
Start: 2025-04-01 | End: 2025-04-08

## 2025-04-01 RX ORDER — ROCURONIUM BROMIDE 10 MG/ML
INJECTION, SOLUTION INTRAVENOUS
Status: DISCONTINUED | OUTPATIENT
Start: 2025-04-01 | End: 2025-04-01

## 2025-04-01 RX ORDER — PHENYLEPHRINE HYDROCHLORIDE 10 MG/ML
INJECTION INTRAVENOUS
Status: DISCONTINUED | OUTPATIENT
Start: 2025-04-01 | End: 2025-04-01

## 2025-04-01 RX ORDER — SODIUM CHLORIDE 0.9 % (FLUSH) 0.9 %
10 SYRINGE (ML) INJECTION
Status: DISCONTINUED | OUTPATIENT
Start: 2025-04-01 | End: 2025-04-01 | Stop reason: HOSPADM

## 2025-04-01 RX ORDER — MIDAZOLAM HYDROCHLORIDE 1 MG/ML
INJECTION INTRAMUSCULAR; INTRAVENOUS
Status: DISCONTINUED | OUTPATIENT
Start: 2025-04-01 | End: 2025-04-01

## 2025-04-01 RX ORDER — FENTANYL CITRATE 50 UG/ML
25 INJECTION, SOLUTION INTRAMUSCULAR; INTRAVENOUS EVERY 5 MIN PRN
Status: DISCONTINUED | OUTPATIENT
Start: 2025-04-01 | End: 2025-04-01 | Stop reason: HOSPADM

## 2025-04-01 RX ORDER — HYDROMORPHONE HYDROCHLORIDE 1 MG/ML
0.2 INJECTION, SOLUTION INTRAMUSCULAR; INTRAVENOUS; SUBCUTANEOUS EVERY 5 MIN PRN
Status: DISCONTINUED | OUTPATIENT
Start: 2025-04-01 | End: 2025-04-01 | Stop reason: HOSPADM

## 2025-04-01 RX ORDER — LIDOCAINE HYDROCHLORIDE 10 MG/ML
1 INJECTION, SOLUTION EPIDURAL; INFILTRATION; INTRACAUDAL; PERINEURAL ONCE
Status: DISCONTINUED | OUTPATIENT
Start: 2025-04-01 | End: 2025-04-01 | Stop reason: HOSPADM

## 2025-04-01 RX ORDER — ONDANSETRON HYDROCHLORIDE 2 MG/ML
INJECTION, SOLUTION INTRAVENOUS
Status: DISCONTINUED | OUTPATIENT
Start: 2025-04-01 | End: 2025-04-01

## 2025-04-01 RX ORDER — MIDAZOLAM HYDROCHLORIDE 1 MG/ML
.5-4 INJECTION, SOLUTION INTRAMUSCULAR; INTRAVENOUS
Status: DISCONTINUED | OUTPATIENT
Start: 2025-04-01 | End: 2025-04-01 | Stop reason: HOSPADM

## 2025-04-01 RX ORDER — FENTANYL CITRATE 50 UG/ML
INJECTION, SOLUTION INTRAMUSCULAR; INTRAVENOUS
Status: DISCONTINUED | OUTPATIENT
Start: 2025-04-01 | End: 2025-04-01

## 2025-04-01 RX ORDER — CLINDAMYCIN HYDROCHLORIDE 300 MG/1
300 CAPSULE ORAL 3 TIMES DAILY
Qty: 21 CAPSULE | Refills: 0 | Status: SHIPPED | OUTPATIENT
Start: 2025-04-01 | End: 2025-04-08

## 2025-04-01 RX ORDER — GLUCAGON 1 MG
1 KIT INJECTION
Status: DISCONTINUED | OUTPATIENT
Start: 2025-04-01 | End: 2025-04-01 | Stop reason: HOSPADM

## 2025-04-01 RX ORDER — BUPIVACAINE HYDROCHLORIDE 7.5 MG/ML
INJECTION, SOLUTION EPIDURAL; RETROBULBAR
Status: COMPLETED | OUTPATIENT
Start: 2025-04-01 | End: 2025-04-01

## 2025-04-01 RX ORDER — OXYCODONE AND ACETAMINOPHEN 5; 325 MG/1; MG/1
1 TABLET ORAL ONCE
Refills: 0 | Status: COMPLETED | OUTPATIENT
Start: 2025-04-01 | End: 2025-04-01

## 2025-04-01 RX ORDER — MUPIROCIN 20 MG/G
OINTMENT TOPICAL
Status: DISCONTINUED | OUTPATIENT
Start: 2025-04-01 | End: 2025-04-01 | Stop reason: HOSPADM

## 2025-04-01 RX ORDER — DEXAMETHASONE SODIUM PHOSPHATE 4 MG/ML
INJECTION, SOLUTION INTRA-ARTICULAR; INTRALESIONAL; INTRAMUSCULAR; INTRAVENOUS; SOFT TISSUE
Status: DISCONTINUED | OUTPATIENT
Start: 2025-04-01 | End: 2025-04-01

## 2025-04-01 RX ORDER — KETAMINE HCL IN 0.9 % NACL 50 MG/5 ML
SYRINGE (ML) INTRAVENOUS
Status: DISCONTINUED | OUTPATIENT
Start: 2025-04-01 | End: 2025-04-01

## 2025-04-01 RX ADMIN — ROCURONIUM BROMIDE 10 MG: 10 INJECTION, SOLUTION INTRAVENOUS at 01:04

## 2025-04-01 RX ADMIN — DEXMEDETOMIDINE 4 MCG: 100 INJECTION, SOLUTION, CONCENTRATE INTRAVENOUS at 12:04

## 2025-04-01 RX ADMIN — Medication 10 MG: at 02:04

## 2025-04-01 RX ADMIN — ROCURONIUM BROMIDE 10 MG: 10 INJECTION, SOLUTION INTRAVENOUS at 02:04

## 2025-04-01 RX ADMIN — FENTANYL CITRATE 25 MCG: 50 INJECTION INTRAMUSCULAR; INTRAVENOUS at 12:04

## 2025-04-01 RX ADMIN — LIDOCAINE HYDROCHLORIDE 100 MG: 20 INJECTION, SOLUTION EPIDURAL; INFILTRATION; INTRACAUDAL at 12:04

## 2025-04-01 RX ADMIN — ROCURONIUM BROMIDE 10 MG: 10 INJECTION, SOLUTION INTRAVENOUS at 03:04

## 2025-04-01 RX ADMIN — SODIUM CHLORIDE, SODIUM GLUCONATE, SODIUM ACETATE, POTASSIUM CHLORIDE, MAGNESIUM CHLORIDE, SODIUM PHOSPHATE, DIBASIC, AND POTASSIUM PHOSPHATE: .53; .5; .37; .037; .03; .012; .00082 INJECTION, SOLUTION INTRAVENOUS at 01:04

## 2025-04-01 RX ADMIN — FENTANYL CITRATE 25 MCG: 50 INJECTION INTRAMUSCULAR; INTRAVENOUS at 04:04

## 2025-04-01 RX ADMIN — Medication 10 MG: at 01:04

## 2025-04-01 RX ADMIN — BUPIVACAINE HYDROCHLORIDE 30 ML: 7.5 INJECTION, SOLUTION EPIDURAL; RETROBULBAR at 11:04

## 2025-04-01 RX ADMIN — SODIUM CHLORIDE: 0.9 INJECTION, SOLUTION INTRAVENOUS at 11:04

## 2025-04-01 RX ADMIN — PHENYLEPHRINE HYDROCHLORIDE 100 MCG: 10 INJECTION INTRAVENOUS at 01:04

## 2025-04-01 RX ADMIN — ONDANSETRON 4 MG: 2 INJECTION INTRAMUSCULAR; INTRAVENOUS at 03:04

## 2025-04-01 RX ADMIN — FENTANYL CITRATE 50 MCG: 50 INJECTION INTRAMUSCULAR; INTRAVENOUS at 11:04

## 2025-04-01 RX ADMIN — OXYCODONE HYDROCHLORIDE AND ACETAMINOPHEN 1 TABLET: 5; 325 TABLET ORAL at 04:04

## 2025-04-01 RX ADMIN — Medication 20 MG: at 12:04

## 2025-04-01 RX ADMIN — FENTANYL CITRATE 25 MCG: 50 INJECTION INTRAMUSCULAR; INTRAVENOUS at 03:04

## 2025-04-01 RX ADMIN — MUPIROCIN: 20 OINTMENT TOPICAL at 11:04

## 2025-04-01 RX ADMIN — HEPARIN SODIUM 5000 UNITS: 5000 INJECTION INTRAVENOUS; SUBCUTANEOUS at 11:04

## 2025-04-01 RX ADMIN — CEFAZOLIN 2 G: 330 INJECTION, POWDER, FOR SOLUTION INTRAMUSCULAR; INTRAVENOUS at 12:04

## 2025-04-01 RX ADMIN — MIDAZOLAM 1 MG: 1 INJECTION INTRAMUSCULAR; INTRAVENOUS at 11:04

## 2025-04-01 RX ADMIN — DEXAMETHASONE SODIUM PHOSPHATE 4 MG: 4 INJECTION, SOLUTION INTRAMUSCULAR; INTRAVENOUS at 12:04

## 2025-04-01 RX ADMIN — ROCURONIUM BROMIDE 40 MG: 10 INJECTION, SOLUTION INTRAVENOUS at 12:04

## 2025-04-01 RX ADMIN — ROCURONIUM BROMIDE 10 MG: 10 INJECTION, SOLUTION INTRAVENOUS at 12:04

## 2025-04-01 RX ADMIN — SUGAMMADEX 300 MG: 100 INJECTION, SOLUTION INTRAVENOUS at 03:04

## 2025-04-01 RX ADMIN — PROPOFOL 150 MG: 10 INJECTION, EMULSION INTRAVENOUS at 12:04

## 2025-04-01 NOTE — OP NOTE
Date of surgery 04/01/2025  Procedure performed  1. Bilateral breast reduction  2. Wide excision soft tissue of the right axilla with layered closure final incision length 18 cm  3. Wide excision soft tissue of the left axilla with layered closure final incision length 18 cm   Pre-Op Diagnosis:   Gigantomastia   Upper back pain  Neck pain  Erythema intertrigo   Cervicalgia   Post-Op Diagnosis: Same  Surgeon Sebastian  Anesthesia general   Complications none   Blood loss 100 cc   Drains x2     The patient was evaluated again in the preoperative holding area.  Two nurses were present during the interview.  I had a long discussion with the patient that she had a previous breast reduction and that I did not know whether she had a superior pedicle and inferior pedicle.  I discussed with the there that when I do not know the pedicle of the risk of losing the nipple is high.  I stated to the patient that I have done for previous procedures without knowing the pedicle and I have lost half of the nipples.  Based on this information and the size of the reduction the patient has agreed to have her nipples removed.  The procedure was explained in detail the patient as well as the risks and possible complications including but not limited to infection, bleeding, breast asymmetry, breast deformity, loss of skin with large open wounds, and need for further surgery.  Patient signed informed consent.  Patient was taken to the operative room after a Cruz pattern had been drawn preoperatively.  Patient was placed in the supine position and after adequate general anesthesia was prepped and draped in a normal sterile fashion.  Our initial plan was to have a pedicle draped in a superior pedicle type fashion even though we were removing the nipples to fold up underneath the breasts for increased projection.  This was marked.  Incisions were made the incision in the inframammary fold was deepened down to the pectoral fascia.  A suprafascial  dissection proceeded superiorly.  All other incisions were then made.  The tissue to be folded under the breasts for increased projection was then de-epithelialized.  This tissue was divided at the midline to fold up underneath the breasts.  Similar procedure was performed on the opposite side.  Next, it was clearly evident that with this reduction she would have a massive amount of tissue in each axilla that would need to be debulked.  One thing to note was that her tissue was very thin.  Patient had almost no dermis at all.  Patient bruised extremely easily.  A temporary closure was then performed it was noted to be too much tension anteriorly where the skin became discolored.  Thus the majority of the flaps were to be used to increase projection were removed both the medial and the lateral wound.  This made less pressure anteriorly.  Hemostasis was achieved using the Bovie as well as multiple surgical clips.  The incisions were then closed of the breast using interrupted 3-0 Monocryl followed by skin staples in remove the inframammary fold.  I did not think there was enough dermis to run a Stratafix suture.  The vertical limb was closed using 4-0 Monocryl followed by 3-0 Stratafix suture.  Next, a wide excision of excess soft tissue in the right and left axilla was performed.  This actually went almost into the axilla to try to take as much tissue as possible.  These incisions both in the right and left breasts were closed using interrupted 3-0 Monocryl followed by skin staples.  Upon completion of the procedure was obvious that the patient had a large amount of bulky tissue in the midline.  Thus and this breast reduction in the incision needed to cross the midline.  A typical V shaped was marked excess tissue was excised and again this was closed using interrupted 3-0 Monocryl followed by running 4-0 Monocryl subcuticular suture.  Dressings were applied 2 drains had been placed 1 on each side.  There were no  complications end of dictation

## 2025-04-01 NOTE — H&P
"History & Physical     SUBJECTIVE:   Chief complaint: large breasts     History of Present Illness:  Megha Robb re-presents to [facility] on 10/25/2024 for evaluation for bilateral breast reduction secondary to symptomatic bilateral large pendulous breast. She has a chief complaint of chronic neck and back pain for over 10 years. She has tried tylenol, ibuprofen, aleve without alleviation of pain. Pt also reports that she has had back surgery without any relief of back and neck pain. She sees pain management who rx muscle relaxants and narcotics without relief of symptoms. She also complains of deep shoulder grooving from her bra straps.  She currently reports a bra size of greater than DDD. She is disabled. She denies smoking tobacco or the use of any nicotine containing products.      Of note, pt had a previous breast reduction over 30 years ago at [facility]. She was instructed to bring her operative note. She states that she cannot find the operative report but she would like to proceed with discussing surgery     PMH:     Review of Systems:   HENT: Positive for neck pain.    Musculoskeletal: Positive for back pain.   Neurological: Negative for headaches or dizziness        OBJECTIVE:      /73   Pulse 80   Ht 5' 3" (1.6 m)   Wt 105.2 kg (231 lb 14.8 oz)   BMI 41.08 kg/m²         Physical Exam:  WD WN NAD  VSS  Normal resp effort  Chest: Effort normal and breath sounds normal. Bilaterally enlarged breasts, evidence of previous rashes, shoulder grooving, no palpable masses, nipple everted        Last MMG: Pt reports she had a mammogram in 2022 and it was normal.     ASSESSMENT/PLAN:      1.Symptomatic Bilateral Macromastia  2. Chronic neck pain  3. Chronic back pain  4. Chronic breast rashes     PLAN:Plan     -Pt was seen and evaluated by myself and Dr. Bowen Cortes.   -Plan for bilateral breast reduction and will need approximately 1800 gm reduction per side with " the intention of symptomatic relief. We also discussed the possibility of removing bilateral nipples. Pt and daughter fully understands that she is at an increased risk of losing her nipples as we do not have the operative report. Pt reports that she would still like to proceed with surgery. She states that she would be happy to get a nipple tattoo after surgery.       Dr. Rafael Reeves, PGY-6  [facility] Fellow

## 2025-04-01 NOTE — ANESTHESIA PROCEDURE NOTES
Peripheral Block    Patient location during procedure: pre-op   Block not for primary anesthetic.  Reason for block: at surgeon's request and post-op pain management   Post-op Pain Location: bilateral chest wall pain   Start time: 4/1/2025 11:45 AM  Timeout: 4/1/2025 11:45 AM   End time: 4/1/2025 12:00 PM    Staffing  Authorizing Provider: Yuriy Dalton MD  Performing Provider: Loren Garcia MD    Staffing  Performed by: Loren Garcia MD  Authorized by: Yuriy Dalton MD    Preanesthetic Checklist  Completed: patient identified, IV checked, site marked, risks and benefits discussed, surgical consent, monitors and equipment checked, pre-op evaluation and timeout performed  Peripheral Block  Patient position: sitting  Prep: ChloraPrep  Patient monitoring: heart rate, cardiac monitor, continuous pulse ox, continuous capnometry and frequent blood pressure checks  Block type: erector spinae plane  Laterality: bilateral  Injection technique: single shot  Interspace: T4-5    Needle  Needle type: Tuohy   Needle gauge: 17 G  Needle length: 3.5 in  Needle localization: anatomical landmarks and ultrasound guidance   -ultrasound image captured on disc.  Assessment  Injection assessment: negative aspiration, negative parasthesia and local visualized surrounding nerve  Paresthesia pain: none  Heart rate change: no  Slow fractionated injection: yes    Medications:    Medications: bupivacaine (pf) (MARCAINE) injection 0.75% - Perineural   30 mL - 4/1/2025 11:50:00 AM    Additional Notes  Patient tolerated well.  See [facility] RN record for vitals.  30 cc of bupivacaine 0.75% mixed with 30 cc sterile water, epi 1:300,000, dexamethasone, clonidine.

## 2025-04-01 NOTE — DISCHARGE INSTRUCTIONS
Keep LENIN drains in place and monitor output, record in log for office visit   Okay to shower in 48hrs, avoid submerging the incisions in bath tub/pool  Use of narcotics can lead to constipation, recommend taking a stool softener while on medications  Take medications as prescribed  F/u in clinic in 1 week  Call the office at [phone] with any questions or concerns

## 2025-04-01 NOTE — ANESTHESIA PREPROCEDURE EVALUATION
04/01/2025  [facility]  Anesthesia Pre-Operative Evaluation     Patient Name: Megha Robb  YOB: 1955  MRN: [id number]  [facility]: [id number]       Admit Date: (Not on file)   Admit Team: Networked reference to record PCT      Date of Procedure: 4/1/2025  Anesthesia: General Procedure: Procedure(s) (LRB):  MAMMOPLASTY, REDUCTION, BILATERAL (Bilateral)  Pre-Operative Diagnosis: Hypertrophy of breast [N62]  Chronic pain in left shoulder [M25.512, G89.29]  Chronic pain in right shoulder [M25.511, G89.29]  Cervicalgia [M54.2]  Proceduralist:Surgeons and Role:     * Bowen Cortes MD - Primary  Code Status: Prior   Advanced Directive: <no information>  Isolation Precautions: No active isolations  Capacity: Full capacity     SUBJECTIVE:   Megha Robb is a 70 y.o. 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  No notes on file      Hospital LOS: 0 days  ICU LOS: Patient does not have an ICU stay during this admission.    she has a current medication list which includes the following long-term medication(s): albuterol, amitriptyline, buspirone, diclofenac sodium, lyrica, fish oil-omega-3 fatty acids, and pantoprazole.     ALLERGIES:     Review of patient's allergies indicates:   Allergen Reactions    Bee sting [allergen ext-venom-honey bee] Shortness Of Breath    Pcn [penicillins] Hives    Vancomycin analogues Rash     LDA:   AIRWAY:         [unfilled]     Lines/Drains/Airways       None                  Anesthesia Evaluation      Airway   Mallampati: II  Neck ROM: Normal ROM  Dental      Pulmonary    Cardiovascular   (+) hypertension    Neuro/Psych    (+) neuromuscular disease, psychiatric history    GI/Hepatic/Renal      Endo/Other    (+) arthritis  Abdominal                     MEDICATIONS:     Current Outpatient Medications on File Prior to Encounter   Medication Sig Dispense Refill Last Dose/Taking    albuterol (VENTOLIN HFA) 90 mcg/actuation inhaler Inhale 2 puffs into the lungs every 6 (six) hours as needed for Wheezing. Rescue 8.5 g 2     amitriptyline (ELAVIL) 25 MG tablet Take 50 mg by mouth every evening.       busPIRone (BUSPAR) 10 MG tablet Take 10 mg by mouth 2 (two) times daily.       CARAFATE 1 gram tablet Take 1 g by mouth 4 (four) times daily before meals and nightly.       diclofenac sodium (VOLTAREN ARTHRITIS PAIN) 1 % Gel Apply 2 g topically 2 (two) times daily as needed (arthritis pain). 200 g 2     ferrous sulfate 325 (65 FE) MG EC tablet Take 325 mg by mouth once daily.        FLUVIRIN 7113-3709 45 mcg (15 mcg x 3)/0.5 mL Susp   0     HYDROcodone-acetaminophen (NORCO)  mg per tablet Take 1 tablet by mouth 4 (four) times daily as needed for Pain.       hydrOXYzine pamoate (VISTARIL) 50 MG Cap Take 50 mg by mouth once daily.  1     LYRICA 200 mg Cap Take 200 mg by mouth 3 (three) times daily.       multivitamin (THERAGRAN) per tablet Take 1 tablet by mouth once daily.         pantoprazole (PROTONIX) 40 MG tablet Take 1 tablet (40 mg total) by mouth once daily. 90 tablet 1       Inpatient Medications:  Antibiotics (From admission, onward)      None          VTE Risk Mitigation (From admission, onward)      None              Current Medications[1]       History:   There are no hospital problems to display for this patient.    Surgical History:    has a past surgical history that includes Hysterectomy; Liposuction; Varicose vein surgery; Gallbladder surgery; Gastric restriction surgery; breast reduction; Wrist Arthroplasty (Bilateral); tummy tuck; left knee replacement; Tonsillectomy; Joint replacement; Eye surgery (Bilateral); Total Reduction Mammoplasty; Appendectomy; Cholecystectomy; Colonoscopy (N/A, 07/28/2023); and Esophagogastroduodenoscopy  "(N/A, 08/18/2023).   Social History:    has no history on file for sexual activity.  reports that she has never smoked. She has never used smokeless tobacco. She reports current alcohol use of about 2.0 standard drinks of alcohol per week. She reports that she does not use drugs.    There were no vitals filed for this visit.  Vital Signs Range (Last 24H):       There is no height or weight on file to calculate BMI.  Wt Readings from Last 4 Encounters:   03/24/25 107 kg (236 lb)   03/14/25 107.4 kg (236 lb 12.8 oz)   03/07/25 99.8 kg (220 lb)   03/03/25 100 kg (220 lb 7.4 oz)        Intake/Output - Last 3 Shifts       None          Lab Results   Component Value Date    WBC 5.33 03/07/2025    HGB 13.0 03/07/2025    HCT 42.4 03/07/2025     03/07/2025     03/07/2025    K 3.9 03/07/2025     03/07/2025    CREATININE 0.7 03/07/2025    BUN 11 03/07/2025    CO2 30 (H) 03/07/2025    GLU 76 03/07/2025    CALCIUM 8.9 03/07/2025    MG 2.2 04/01/2024    ALKPHOS 91 12/05/2024    ALT 12 12/05/2024    AST 26 12/05/2024    ALBUMIN 3.4 (L) 12/05/2024    BNP 63 11/15/2024     No results found for this or any previous visit (from the past 12 hours).  No results for input(s): "WBC", "HGB", "HCT", "PLT", "NA", "K", "CREATININE", "GLU", "INR" in the last 168 hours.  No LMP recorded. Patient has had a hysterectomy.    EKG:   Results for orders placed or performed during the hospital encounter of 03/07/25   EKG 12-lead    Collection Time: 03/07/25  8:09 AM   Result Value Ref Range    QRS Duration 100 ms    [facility] QTC Calculation 429 ms    Narrative    Test Reason : I10,R01.1,    Vent. Rate :  68 BPM     Atrial Rate :  68 BPM     P-R Int : 182 ms          QRS Dur : 100 ms      QT Int : 404 ms       P-R-T Axes :   6  -2   0 degrees    QTcB Int : 429 ms    Normal sinus rhythm  Minimal voltage criteria for LVH, may be normal variant ( R in aVL )  Inferior infarct ,age undetermined  Nonspecific T wave abnormality  Abnormal " "ECG  When compared with ECG of 19-Feb-2025 15:59,  Nonspecific T wave abnormality now evident in Lateral leads  Normal lead placement across the precordium Now present  Confirmed by Mil Mejia ([id number]) on 3/7/2025 12:46:19 PM    Referred By: ADARSH VILLARREAL           Confirmed By: Mil Mejia     TTE:  No results found for this or any previous visit.  No results found for this or any previous visit.  JACKI:  No results found for this or any previous visit.  Stress Test:  No results found for this or any previous visit.     LHC:  No results found for this or any previous visit.     PFT:  No results found for: "FEV1", "FVC", "EUC0YUO", "TLC", "DLCO"           Pre-op Assessment    I have reviewed the Patient Summary Reports.     I have reviewed the Nursing Notes. I have reviewed the NPO Status.   I have reviewed the Medications.     Review of Systems  Anesthesia Hx:               Denies Personal Hx of Anesthesia complications.                    Cardiovascular:     Hypertension                                          Musculoskeletal:  Arthritis               Neurological:    Neuromuscular Disease,                                   Psych:  Psychiatric History                  Physical Exam  General: Alert, Cooperative and Oriented    Airway:  Mallampati: II   Mouth Opening: Normal  Neck ROM: Normal ROM        Anesthesia Plan  Type of Anesthesia, risks & benefits discussed:    Anesthesia Type: Gen ETT, Regional  Intra-op Monitoring Plan: Standard ASA Monitors  Post Op Pain Control Plan: multimodal analgesia  Induction:  IV  Airway Plan: Direct  Informed Consent: Informed consent signed with the Patient and all parties understand the risks and agree with anesthesia plan.  All questions answered.   ASA Score: 3  Day of Surgery Review of History & Physical: H&P Update referred to the surgeon/provider.    Ready For Surgery From Anesthesia Perspective.     .           [1]   No current facility-administered medications for this " encounter.     Current Outpatient Medications   Medication Sig Dispense Refill    albuterol (VENTOLIN HFA) 90 mcg/actuation inhaler Inhale 2 puffs into the lungs every 6 (six) hours as needed for Wheezing. Rescue 8.5 g 2    amitriptyline (ELAVIL) 25 MG tablet Take 50 mg by mouth every evening.      busPIRone (BUSPAR) 10 MG tablet Take 10 mg by mouth 2 (two) times daily.      CARAFATE 1 gram tablet Take 1 g by mouth 4 (four) times daily before meals and nightly.      diclofenac sodium (VOLTAREN ARTHRITIS PAIN) 1 % Gel Apply 2 g topically 2 (two) times daily as needed (arthritis pain). 200 g 2    ferrous sulfate 325 (65 FE) MG EC tablet Take 325 mg by mouth once daily.       FLUVIRIN [id number] 45 mcg (15 mcg x 3)/0.5 mL Susp   0    HYDROcodone-acetaminophen (NORCO)  mg per tablet Take 1 tablet by mouth 4 (four) times daily as needed for Pain.      hydrOXYzine pamoate (VISTARIL) 50 MG Cap Take 50 mg by mouth once daily.  1    LYRICA 200 mg Cap Take 200 mg by mouth 3 (three) times daily.      multivitamin (THERAGRAN) per tablet Take 1 tablet by mouth once daily.        omega-3 fatty acids/fish oil (FISH OIL-OMEGA-3 FATTY ACIDS) 300-1,000 mg capsule Take by mouth once daily.      pantoprazole (PROTONIX) 40 MG tablet Take 1 tablet (40 mg total) by mouth once daily. 90 tablet 1    quinapriL (ACCUPRIL) 40 MG tablet Take 40 mg by mouth every evening.

## 2025-04-01 NOTE — PLAN OF CARE
Patient tolerating oral liquids without difficulty. No apparent s&s of distress noted at this time, no complaints voiced at this time. Patient able to void. Discharge instructions reviewed with patient/family with good verbal feedback received. Patient ready for discharge. Iv removed. Pain under control.

## 2025-04-01 NOTE — TRANSFER OF CARE
"Anesthesia Transfer of Care Note    Patient: Megha Robb    Procedure(s) Performed: Procedure(s) (LRB):  MAMMOPLASTY, REDUCTION, BILATERAL (Bilateral)    Patient location: PACU    Anesthesia Type: general    Transport from OR: Transported from OR on 6-10 L/min O2 by face mask with adequate spontaneous ventilation    Post pain: adequate analgesia    Post assessment: no apparent anesthetic complications    Post vital signs: stable    Level of consciousness: awake and sedated    Nausea/Vomiting: no nausea/vomiting    Complications: none    Transfer of care protocol was followed      Last vitals: Visit Vitals  /83 (BP Location: Left forearm, Patient Position: Lying)   Pulse 68   Temp 37.2 °C (98.9 °F) (Temporal)   Resp 19   Ht 5' 2" (1.575 m)   Wt 108.7 kg (239 lb 10.2 oz)   SpO2 100%   Breastfeeding No   BMI 43.83 kg/m²     "

## 2025-04-01 NOTE — BRIEF OP NOTE
[facility] Valderrama - Surgery ([facility])  Brief Operative Note    Surgery Date: 4/1/2025     Surgeons and Role:     * Bowen Cortes MD - Primary     * Ruperto Avalos MD - Fellow     * Rafael Reeves MD - Fellow    Assisting Surgeon: None    Pre-op Diagnosis:  Hypertrophy of breast [N62]  Chronic pain in left shoulder [M25.512, G89.29]  Chronic pain in right shoulder [M25.511, G89.29]  Cervicalgia [M54.2]    Post-op Diagnosis:  Post-Op Diagnosis Codes:     * Hypertrophy of breast [N62]     * Chronic pain in left shoulder [M25.512, G89.29]     * Chronic pain in right shoulder [M25.511, G89.29]     * Cervicalgia [M54.2]    Procedure(s) (LRB):  MAMMOPLASTY, REDUCTION, BILATERAL (Bilateral)    Anesthesia: General    Operative Findings: bilateral batwing breast reduction     Estimated Blood Loss: 300 mL         Specimens:   Specimen (24h ago, onward)       Start     Ordered    04/01/25 1243  Specimen to Pathology Breast  RELEASE UPON ORDERING        References:    Click here for ordering Quick Tip   Question:  Release to patient  Answer:  Immediate    04/01/25 1243                    ID Type Source Tests Collected by Time Destination   1 : left breast tissue Tissue Breast, Left SPECIMEN TO PATHOLOGY Bowen Cortes MD 4/1/2025 1242    2 : right breast tissue Tissue Breast, Right SPECIMEN TO PATHOLOGY Bowen Cortes MD 4/1/2025 1243            Discharge Note    OUTCOME: Patient tolerated treatment/procedure well without complication and is now ready for discharge.    DISPOSITION: Home or Self Care    FINAL DIAGNOSIS:  <principal problem not specified>    FOLLOWUP: In clinic    DISCHARGE INSTRUCTIONS:    Discharge Procedure Orders   Basic Metabolic Panel   Standing Status: Future Standing Exp. Date: 04/11/26     CBC Without Differential   Standing Status: Future Standing Exp. Date: 04/11/26

## 2025-04-01 NOTE — ANESTHESIA PROCEDURE NOTES
Intubation    Date/Time: 4/1/2025 12:14 PM    Performed by: Lombardi, Nicole A., CRNA  Authorized by: Mendoza Michaud MD    Intubation:     Induction:  Intravenous    Intubated:  Postinduction    Mask Ventilation:  Easy mask    Attempts:  1    Attempted By:  CRNA    Method of Intubation:  Video laryngoscopy    Blade:  Rizvi 3    Laryngeal View Grade: Grade I - full view of cords      Difficult Airway Encountered?: No      Complications:  None    Airway Device:  Oral endotracheal tube    Airway Device Size:  7.5    Style/Cuff Inflation:  Cuffed    Inflation Amount (mL):  6    Tube secured:  20    Secured at:  The lips    Placement Verified By:  Capnometry and Revisualization with laryngoscopy    Complicating Factors:  Obesity    Findings Post-Intubation:  BS equal bilateral and atraumatic/condition of teeth unchanged

## 2025-04-04 LAB
ESTROGEN SERPL-MCNC: NORMAL PG/ML
INSULIN SERPL-ACNC: NORMAL U[IU]/ML
LAB AP CLINICAL INFORMATION: NORMAL
LAB AP GROSS DESCRIPTION: NORMAL
LAB AP PERFORMING LOCATION(S): NORMAL
LAB AP REPORT FOOTNOTES: NORMAL
T3RU NFR SERPL: NORMAL %

## 2025-04-05 NOTE — ANESTHESIA POSTPROCEDURE EVALUATION
Anesthesia Post Evaluation    Patient: Megha Robb    Procedure(s) Performed: Procedure(s) (LRB):  MAMMOPLASTY, REDUCTION, BILATERAL (Bilateral)    Final Anesthesia Type: general      Patient location during evaluation: PACU  Patient participation: Yes- Able to Participate  Level of consciousness: awake and alert  Post-procedure vital signs: reviewed and stable  Pain management: adequate  Airway patency: patent    PONV status at discharge: No PONV  Anesthetic complications: no      Cardiovascular status: hemodynamically stable  Hydration status: euvolemic  Follow-up not needed.              Vitals Value Taken Time   /87 04/01/25 17:55   Temp  04/05/25 06:31   Pulse 63 04/01/25 17:55   Resp 18 04/01/25 17:37   SpO2 94 % 04/01/25 17:38   Vitals shown include unfiled device data.      No case tracking events are documented in the log.      Pain/Juan Score: No data recorded

## 2025-04-09 ENCOUNTER — OFFICE VISIT (OUTPATIENT)
Dept: PLASTIC SURGERY | Facility: CLINIC | Age: 70
End: 2025-04-09
Payer: MEDICARE

## 2025-04-09 VITALS
WEIGHT: 220.63 LBS | BODY MASS INDEX: 40.6 KG/M2 | DIASTOLIC BLOOD PRESSURE: 62 MMHG | HEART RATE: 63 BPM | HEIGHT: 62 IN | SYSTOLIC BLOOD PRESSURE: 123 MMHG

## 2025-04-09 DIAGNOSIS — Z09 SURGERY FOLLOW-UP EXAMINATION: Primary | ICD-10-CM

## 2025-04-09 PROCEDURE — 4010F ACE/ARB THERAPY RXD/TAKEN: CPT | Mod: CPTII,S$GLB,, | Performed by: SURGERY

## 2025-04-09 PROCEDURE — 1159F MED LIST DOCD IN RCRD: CPT | Mod: CPTII,S$GLB,, | Performed by: SURGERY

## 2025-04-09 PROCEDURE — 1126F AMNT PAIN NOTED NONE PRSNT: CPT | Mod: CPTII,S$GLB,, | Performed by: SURGERY

## 2025-04-09 PROCEDURE — 3288F FALL RISK ASSESSMENT DOCD: CPT | Mod: CPTII,S$GLB,, | Performed by: SURGERY

## 2025-04-09 PROCEDURE — 3066F NEPHROPATHY DOC TX: CPT | Mod: CPTII,S$GLB,, | Performed by: SURGERY

## 2025-04-09 PROCEDURE — 99024 POSTOP FOLLOW-UP VISIT: CPT | Mod: S$GLB,,, | Performed by: SURGERY

## 2025-04-09 PROCEDURE — 1101F PT FALLS ASSESS-DOCD LE1/YR: CPT | Mod: CPTII,S$GLB,, | Performed by: SURGERY

## 2025-04-09 PROCEDURE — 3074F SYST BP LT 130 MM HG: CPT | Mod: CPTII,S$GLB,, | Performed by: SURGERY

## 2025-04-09 PROCEDURE — 3078F DIAST BP <80 MM HG: CPT | Mod: CPTII,S$GLB,, | Performed by: SURGERY

## 2025-04-09 PROCEDURE — 99999 PR PBB SHADOW E&M-EST. PATIENT-LVL III: CPT | Mod: PBBFAC,,, | Performed by: SURGERY

## 2025-04-09 PROCEDURE — 3061F NEG MICROALBUMINURIA REV: CPT | Mod: CPTII,S$GLB,, | Performed by: SURGERY

## 2025-04-09 NOTE — PROGRESS NOTES
Megha Robb presents to [facility] for a follow up visit status post bilateral breast reduction and removal of nipples on 4/1/25 by Dr. Bowen Cortes. She is doing well today with no issues. She denies fever, chills, nausea, vomiting or other systemic signs of infection.     ROS - negative, other than stated above in HPI    PHYSICAL EXAMINATION  Vitals:    04/09/25 1109   BP: 123/62   Pulse: 63     WD WN NAD  VSS  Normal respiratory effort  R breast - incision with staples in place, no erythema or drainage, drain with SS output  L breast - incision with staples in place, no erythema or drainage, drain with SS output    ASSESSMENT/PLAN  70 y.o. F s/p bilateral breast reduction   - Doing well, no issues.   - Pt was seen and evaluated by myself and Dr. Bowen Cortes.  - Bilateral breast drains remain will possibly pull next week.  - RTC x 1 week. Staff to schedule.    All questions were answered. The patient was advised to call the clinic with any questions or concerns prior to their next visit.     Dr. Rafael Reeves, PGY-6  [facility] Plastic & Reconstructive Surgery Fellow

## 2025-04-16 ENCOUNTER — OFFICE VISIT (OUTPATIENT)
Dept: PLASTIC SURGERY | Facility: CLINIC | Age: 70
End: 2025-04-16
Payer: MEDICARE

## 2025-04-16 VITALS
SYSTOLIC BLOOD PRESSURE: 123 MMHG | DIASTOLIC BLOOD PRESSURE: 62 MMHG | HEART RATE: 63 BPM | HEIGHT: 62 IN | BODY MASS INDEX: 41.77 KG/M2 | WEIGHT: 227 LBS

## 2025-04-16 DIAGNOSIS — Z09 SURGERY FOLLOW-UP EXAMINATION: Primary | ICD-10-CM

## 2025-04-16 PROCEDURE — 1101F PT FALLS ASSESS-DOCD LE1/YR: CPT | Mod: CPTII,S$GLB,, | Performed by: SURGERY

## 2025-04-16 PROCEDURE — 99024 POSTOP FOLLOW-UP VISIT: CPT | Mod: S$GLB,,, | Performed by: SURGERY

## 2025-04-16 PROCEDURE — 3288F FALL RISK ASSESSMENT DOCD: CPT | Mod: CPTII,S$GLB,, | Performed by: SURGERY

## 2025-04-16 PROCEDURE — 3061F NEG MICROALBUMINURIA REV: CPT | Mod: CPTII,S$GLB,, | Performed by: SURGERY

## 2025-04-16 PROCEDURE — 1126F AMNT PAIN NOTED NONE PRSNT: CPT | Mod: CPTII,S$GLB,, | Performed by: SURGERY

## 2025-04-16 PROCEDURE — 3074F SYST BP LT 130 MM HG: CPT | Mod: CPTII,S$GLB,, | Performed by: SURGERY

## 2025-04-16 PROCEDURE — 4010F ACE/ARB THERAPY RXD/TAKEN: CPT | Mod: CPTII,S$GLB,, | Performed by: SURGERY

## 2025-04-16 PROCEDURE — 3066F NEPHROPATHY DOC TX: CPT | Mod: CPTII,S$GLB,, | Performed by: SURGERY

## 2025-04-16 PROCEDURE — 3078F DIAST BP <80 MM HG: CPT | Mod: CPTII,S$GLB,, | Performed by: SURGERY

## 2025-04-16 PROCEDURE — 99999 PR PBB SHADOW E&M-EST. PATIENT-LVL III: CPT | Mod: PBBFAC,,, | Performed by: SURGERY

## 2025-04-16 RX ORDER — CLINDAMYCIN HYDROCHLORIDE 300 MG/1
300 CAPSULE ORAL 3 TIMES DAILY
Qty: 21 CAPSULE | Refills: 0 | Status: SHIPPED | OUTPATIENT
Start: 2025-04-16 | End: 2025-04-23

## 2025-04-16 NOTE — PROGRESS NOTES
Megha Robb presents to Plastic Surgery Clinic for a follow up visit status post bilateral batwing breast reduction on 4/1/2025 by Dr. Bowen Cortes. Pt reports she has bilateral drains in place. Pt reports some redness and itching along her incision of both breasts. She denies fever, chills, nausea, vomiting or other systemic signs of infection.     ROS - negative, other than stated above in HPI    PHYSICAL EXAMINATION  Vitals:    04/16/25 0952   BP: 123/62   Pulse: 63     WD WN NAD  VSS  Normal respiratory effort  R breast - some redness at IMF incision along staples, no drainage, small breakdown lateral to the T that was unroofed today, surgically absent nipple, drain with SS output  L breast - some redness at IMF incision along staples, no drainage, surgically absent nipple, drain with SS output    ASSESSMENT/PLAN  70 y.o. F s/p bilateral breast reduction   - Pt was seen and evaluated by myself and Dr. Bowen Cortes.  - Bilateral breast drains removed today with no complications. Every other staple removed. Right breast breakdown was unroofed and bacitracin was applied. Pt instructed to use bacitracin at this area. Pt agreeable. Pt  - RTC x 1 week(s). Staff to schedule.    All questions were answered. The patient was advised to call the clinic with any questions or concerns prior to their next visit.     Phyllis Flores PA-C  Plastic and Reconstructive Surgery   [phone]

## 2025-04-22 ENCOUNTER — OFFICE VISIT (OUTPATIENT)
Dept: PLASTIC SURGERY | Facility: CLINIC | Age: 70
End: 2025-04-22
Payer: MEDICARE

## 2025-04-22 VITALS
WEIGHT: 227.06 LBS | HEART RATE: 63 BPM | DIASTOLIC BLOOD PRESSURE: 62 MMHG | HEIGHT: 62 IN | SYSTOLIC BLOOD PRESSURE: 123 MMHG | BODY MASS INDEX: 41.78 KG/M2

## 2025-04-22 DIAGNOSIS — Z98.890 S/P BILATERAL BREAST REDUCTION: Primary | ICD-10-CM

## 2025-04-22 DIAGNOSIS — Z09 SURGERY FOLLOW-UP EXAMINATION: ICD-10-CM

## 2025-04-22 PROCEDURE — 99999 PR PBB SHADOW E&M-EST. PATIENT-LVL III: CPT | Mod: PBBFAC,,, | Performed by: SURGERY

## 2025-04-22 NOTE — PROGRESS NOTES
Patient presents to the Plastic Surgery clinic s/p bilateral batwing breast reduction. Last week, patient had her drains removed and given antibiotics. Patient reports she has completed her course of antibiotics. She does note that her left breast is more painful than her right breast. I did probe the left lateral breast in 2 different locations. No fluid was noted. Patient also had staples along her incision which was removed today. Left breast ultrasound was ordered. Patient will return to clinic after obtaining her ultrasound or in 1 week.

## 2025-04-23 ENCOUNTER — HOSPITAL ENCOUNTER (OUTPATIENT)
Dept: RADIOLOGY | Facility: HOSPITAL | Age: 70
Discharge: HOME | End: 2025-04-23
Payer: MEDICARE

## 2025-04-23 ENCOUNTER — OFFICE VISIT (OUTPATIENT)
Dept: PLASTIC SURGERY | Facility: CLINIC | Age: 70
End: 2025-04-23
Payer: MEDICARE

## 2025-04-23 VITALS
SYSTOLIC BLOOD PRESSURE: 123 MMHG | HEART RATE: 63 BPM | BODY MASS INDEX: 41.78 KG/M2 | WEIGHT: 227.06 LBS | HEIGHT: 62 IN | DIASTOLIC BLOOD PRESSURE: 62 MMHG

## 2025-04-23 DIAGNOSIS — Z09 SURGERY FOLLOW-UP EXAMINATION: Primary | ICD-10-CM

## 2025-04-23 DIAGNOSIS — Z98.890 S/P BILATERAL BREAST REDUCTION: ICD-10-CM

## 2025-04-23 PROCEDURE — 76642 ULTRASOUND BREAST LIMITED: CPT | Mod: 26,LT,, | Performed by: RADIOLOGY

## 2025-04-23 PROCEDURE — 1101F PT FALLS ASSESS-DOCD LE1/YR: CPT | Mod: CPTII,S$GLB,, | Performed by: SURGERY

## 2025-04-23 PROCEDURE — 3074F SYST BP LT 130 MM HG: CPT | Mod: CPTII,S$GLB,, | Performed by: SURGERY

## 2025-04-23 PROCEDURE — 99999 PR PBB SHADOW E&M-EST. PATIENT-LVL III: CPT | Mod: PBBFAC,,, | Performed by: SURGERY

## 2025-04-23 PROCEDURE — 3066F NEPHROPATHY DOC TX: CPT | Mod: CPTII,S$GLB,, | Performed by: SURGERY

## 2025-04-23 PROCEDURE — 76642 ULTRASOUND BREAST LIMITED: CPT | Mod: TC,PO,LT

## 2025-04-23 PROCEDURE — 1126F AMNT PAIN NOTED NONE PRSNT: CPT | Mod: CPTII,S$GLB,, | Performed by: SURGERY

## 2025-04-23 PROCEDURE — 3078F DIAST BP <80 MM HG: CPT | Mod: CPTII,S$GLB,, | Performed by: SURGERY

## 2025-04-23 PROCEDURE — 1159F MED LIST DOCD IN RCRD: CPT | Mod: CPTII,S$GLB,, | Performed by: SURGERY

## 2025-04-23 PROCEDURE — 99024 POSTOP FOLLOW-UP VISIT: CPT | Mod: S$GLB,,, | Performed by: SURGERY

## 2025-04-23 PROCEDURE — 3061F NEG MICROALBUMINURIA REV: CPT | Mod: CPTII,S$GLB,, | Performed by: SURGERY

## 2025-04-23 PROCEDURE — 3288F FALL RISK ASSESSMENT DOCD: CPT | Mod: CPTII,S$GLB,, | Performed by: SURGERY

## 2025-04-23 PROCEDURE — 4010F ACE/ARB THERAPY RXD/TAKEN: CPT | Mod: CPTII,S$GLB,, | Performed by: SURGERY

## 2025-04-24 NOTE — PROGRESS NOTES
Patient presents to Plastic surgery Clinic after having a massive breast reduction.  She had a batwing technique.  Her drains were removed at the appropriate time.  She did develop a seroma in the left breast.  I did get an ultrasound on this in did attempt to aspirate it.  Ultrasound showed a 12 x 12 x 5 cm serous collection.  I did aspirate a proximally 60 cc of clear fluid from her breasts.  The breast is much more soft.  I do believe however this need to be monitored she may actually need a drain in the future on this side.  We will see her back next week.

## 2025-04-25 ENCOUNTER — TELEPHONE (OUTPATIENT)
Dept: INTERVENTIONAL RADIOLOGY/VASCULAR | Facility: CLINIC | Age: 70
End: 2025-04-25
Payer: MEDICARE

## 2025-04-25 ENCOUNTER — DOCUMENTATION ONLY (OUTPATIENT)
Dept: PLASTIC SURGERY | Facility: HOSPITAL | Age: 70
End: 2025-04-25
Payer: MEDICARE

## 2025-04-25 DIAGNOSIS — M25.512 CHRONIC PAIN IN LEFT SHOULDER: ICD-10-CM

## 2025-04-25 DIAGNOSIS — G89.29 CHRONIC PAIN IN LEFT SHOULDER: ICD-10-CM

## 2025-04-25 DIAGNOSIS — N62 HYPERTROPHY OF BREAST: ICD-10-CM

## 2025-04-25 DIAGNOSIS — Z98.890 S/P BILATERAL BREAST REDUCTION: Primary | ICD-10-CM

## 2025-04-25 DIAGNOSIS — G89.29 CHRONIC PAIN IN RIGHT SHOULDER: ICD-10-CM

## 2025-04-25 DIAGNOSIS — M25.511 CHRONIC PAIN IN RIGHT SHOULDER: ICD-10-CM

## 2025-04-25 RX ORDER — CLINDAMYCIN HYDROCHLORIDE 300 MG/1
300 CAPSULE ORAL 3 TIMES DAILY
Qty: 21 CAPSULE | Refills: 0 | Status: SHIPPED | OUTPATIENT
Start: 2025-04-25 | End: 2025-05-02

## 2025-04-25 NOTE — PROGRESS NOTES
Patient is s/p batwing BBR and contacted the office to report that her left breast is swollen. Of note, pt recently had an ultrasound of the left breast that revealed a fluid collection. On Wednesday, approximately 60 ccs of SS fluid was aspirated. Today, pt states that she believes fluid has re-accumulated. I saw the pt today and approximately 460-480 ccs of SS fluid was aspirated. Rx clindamycin and referral was placed to IR for drain placement. Dr. Meka Castro MD (fellow) also obtained an ultrasound in the office today. Pt was heavily counseled on taking her antibiotics and following back up in clinic on Monday. Pt and daughter-in-law was also given contact of plastic surgery fellow should they have any concerns over the weekend. Pt and daughter-in-law verbalized understanding.

## 2025-04-28 ENCOUNTER — OFFICE VISIT (OUTPATIENT)
Dept: PLASTIC SURGERY | Facility: CLINIC | Age: 70
End: 2025-04-28
Payer: MEDICARE

## 2025-04-28 ENCOUNTER — TELEPHONE (OUTPATIENT)
Dept: INTERVENTIONAL RADIOLOGY/VASCULAR | Facility: CLINIC | Age: 70
End: 2025-04-28
Payer: MEDICARE

## 2025-04-28 DIAGNOSIS — Z09 SURGERY FOLLOW-UP EXAMINATION: Primary | ICD-10-CM

## 2025-04-28 PROCEDURE — 4010F ACE/ARB THERAPY RXD/TAKEN: CPT | Mod: CPTII,S$GLB,,

## 2025-04-28 PROCEDURE — 3066F NEPHROPATHY DOC TX: CPT | Mod: CPTII,S$GLB,,

## 2025-04-28 PROCEDURE — 99024 POSTOP FOLLOW-UP VISIT: CPT | Mod: S$GLB,,,

## 2025-04-28 PROCEDURE — 3061F NEG MICROALBUMINURIA REV: CPT | Mod: CPTII,S$GLB,,

## 2025-04-28 NOTE — PROGRESS NOTES
Megha Robb presents to [facility] Surgery [facility] for a follow up visit status post bilateral batwing breast reduction w/ nipple removal on 4/1/2025 by Dr. Bowen Cortes. Of note, pt did have an ultrasound of her left breast which revealed a fluid collection. She was seen on Friday 4/25 and roughly 450 ccs of fluid was aspirated. Today, she states that she does note that her left breast has increased in size. She did  her antibiotics as well. She denies fever, chills, nausea, vomiting or other systemic signs of infection.     ROS - negative, other than stated above in HPI    PHYSICAL EXAMINATION  There were no vitals filed for this visit.  WD WN NAD  VSS  Normal respiratory effort  R breast - incision with nylon sutures surrounding the T, surgically absent nipples.   L breast - incision with nylon sutures and staples surrounding the T, surgically absent nipples, larger in size than R breast, some erythema that has gotten better since last seen    ASSESSMENT/PLAN  70 y.o. F s/p bilateral breast reduction   - Roughly 180 ccs of SS fluid was aspirated from the left breast. Pt was informed that she absolutely must follow up with IR for drain placement. Pt reports that she has not called them back yet. Pt and daughter in law were given the phone number for IR and they have agreed to call and schedule an appt asap. Pt instructed to take her antibiotics, pt agreeable.   - RTC on Wednesday. Staff to schedule.    All questions were answered. The patient was advised to call the clinic with any questions or concerns prior to their next visit.     Phyllis Flores   Plastic and Reconstructive Surgery   [phone]

## 2025-04-30 ENCOUNTER — OFFICE VISIT (OUTPATIENT)
Dept: PLASTIC SURGERY | Facility: CLINIC | Age: 70
End: 2025-04-30
Payer: MEDICARE

## 2025-04-30 VITALS — WEIGHT: 216.94 LBS | BODY MASS INDEX: 39.92 KG/M2 | HEIGHT: 62 IN

## 2025-04-30 DIAGNOSIS — Z98.890 S/P BILATERAL BREAST REDUCTION: ICD-10-CM

## 2025-04-30 DIAGNOSIS — Z09 SURGERY FOLLOW-UP EXAMINATION: Primary | ICD-10-CM

## 2025-04-30 PROCEDURE — 99024 POSTOP FOLLOW-UP VISIT: CPT | Mod: S$GLB,,,

## 2025-04-30 PROCEDURE — 4010F ACE/ARB THERAPY RXD/TAKEN: CPT | Mod: CPTII,S$GLB,,

## 2025-04-30 PROCEDURE — 3061F NEG MICROALBUMINURIA REV: CPT | Mod: CPTII,S$GLB,,

## 2025-04-30 PROCEDURE — 1101F PT FALLS ASSESS-DOCD LE1/YR: CPT | Mod: CPTII,S$GLB,,

## 2025-04-30 PROCEDURE — 87186 SC STD MICRODIL/AGAR DIL: CPT

## 2025-04-30 PROCEDURE — 3066F NEPHROPATHY DOC TX: CPT | Mod: CPTII,S$GLB,,

## 2025-04-30 PROCEDURE — 87075 CULTR BACTERIA EXCEPT BLOOD: CPT

## 2025-04-30 PROCEDURE — 1126F AMNT PAIN NOTED NONE PRSNT: CPT | Mod: CPTII,S$GLB,,

## 2025-04-30 PROCEDURE — 99999 PR PBB SHADOW E&M-EST. PATIENT-LVL III: CPT | Mod: PBBFAC,,,

## 2025-04-30 PROCEDURE — 3288F FALL RISK ASSESSMENT DOCD: CPT | Mod: CPTII,S$GLB,,

## 2025-04-30 NOTE — PROGRESS NOTES
Megha Robb presents to Plastic Surgery Clinic for a follow up visit status post bilateral batwing breast reduction w/ nipple removal on 4/1/2025 by Dr. Bowen Cortes. Of note, pt did have an ultrasound of her left breast which revealed a fluid collection. She was seen on Friday 4/25 and roughly 450 ccs of fluid was aspirated. Today, she states that she does note that her left breast has increased in size. She did  her antibiotics as well. She denies fever, chills, nausea, vomiting or other systemic signs of infection.     Interval History 4/30/2025:  Pt reports to the Plastic Surgery clinic stating her left breast has fluid collection. She does note some drainage as well.      ROS - negative, other than stated above in HPI    PHYSICAL EXAMINATION  There were no vitals filed for this visit.  WD WN NAD  VSS  Normal respiratory effort  R breast - incision with nylon sutures surrounding the T, surgically absent nipples.   L breast - incision with nylon sutures and staples surrounding the T, surgically absent nipples, larger in size than R breast, some erythema + SS drainage     ASSESSMENT/PLAN  70 y.o. F s/p bilateral breast reduction   - Pt was seen and evaluated by myself and Dr. Ruperto Avalos MD (fellow).   - A 1 inch incision was made lateral to the T. Roughly 100 ccs of fluid was drained from this incision. A culture was also obtained. The area was cleaned with Vashe solution and packed with Vashe soaked Kerlix. Pt's daughter in law Lakisha reports that she will change the packing daily with Iodoform and place a pad over this area. We will continue to follow the pt closely. This was discussed with Dr. Cortes.   - RTC next week. Staff to schedule.    All questions were answered. The patient was advised to call the clinic with any questions or concerns prior to their next visit.     Phyllis Flores   Plastic and Reconstructive Surgery   [phone]

## 2025-05-01 ENCOUNTER — TELEPHONE (OUTPATIENT)
Dept: PLASTIC SURGERY | Facility: CLINIC | Age: 70
End: 2025-05-01
Payer: MEDICARE

## 2025-05-01 ENCOUNTER — TELEPHONE (OUTPATIENT)
Dept: INTERVENTIONAL RADIOLOGY/VASCULAR | Facility: HOSPITAL | Age: 70
End: 2025-05-01
Payer: MEDICARE

## 2025-05-01 RX ORDER — SULFAMETHOXAZOLE AND TRIMETHOPRIM 800; 160 MG/1; MG/1
1 TABLET ORAL 2 TIMES DAILY
Qty: 14 TABLET | Refills: 0 | Status: SHIPPED | OUTPATIENT
Start: 2025-05-01 | End: 2025-05-08

## 2025-05-01 NOTE — NURSING
"Patient requested cancellation. She stated that she went to have the site checked today and was advised that "The hole looked good. It didn't have no blood or no pus in it. They told us I didn't have to come tomorrow."   "

## 2025-05-02 ENCOUNTER — TELEPHONE (OUTPATIENT)
Dept: PLASTIC SURGERY | Facility: CLINIC | Age: 70
End: 2025-05-02
Payer: MEDICARE

## 2025-05-02 NOTE — TELEPHONE ENCOUNTER
Contacted pt and pt's daughter in law Lakisha. Lakisha reports redness of left breast has subsided and pt is no longer draining from left breast. She will change the packing today. Pt and Lakisha informed to return to clinic on Monday morning. Lakisha reports that they will be there. Advised pt and Lakisha that if there are any concerns over the weekend to contact plastic surgery fellow. Verbalized understanding.

## 2025-05-03 LAB — BACTERIA SPEC AEROBE CULT: ABNORMAL

## 2025-05-05 ENCOUNTER — OFFICE VISIT (OUTPATIENT)
Dept: PLASTIC SURGERY | Facility: CLINIC | Age: 70
End: 2025-05-05
Payer: MEDICARE

## 2025-05-05 VITALS
HEIGHT: 62 IN | DIASTOLIC BLOOD PRESSURE: 69 MMHG | SYSTOLIC BLOOD PRESSURE: 123 MMHG | BODY MASS INDEX: 39.92 KG/M2 | WEIGHT: 216.94 LBS | HEART RATE: 63 BPM

## 2025-05-05 DIAGNOSIS — Z09 SURGERY FOLLOW-UP EXAMINATION: Primary | ICD-10-CM

## 2025-05-05 PROCEDURE — 3066F NEPHROPATHY DOC TX: CPT | Mod: CPTII,S$GLB,,

## 2025-05-05 PROCEDURE — 99024 POSTOP FOLLOW-UP VISIT: CPT | Mod: S$GLB,,,

## 2025-05-05 PROCEDURE — 4010F ACE/ARB THERAPY RXD/TAKEN: CPT | Mod: CPTII,S$GLB,,

## 2025-05-05 PROCEDURE — 3078F DIAST BP <80 MM HG: CPT | Mod: CPTII,S$GLB,,

## 2025-05-05 PROCEDURE — 1101F PT FALLS ASSESS-DOCD LE1/YR: CPT | Mod: CPTII,S$GLB,,

## 2025-05-05 PROCEDURE — 3288F FALL RISK ASSESSMENT DOCD: CPT | Mod: CPTII,S$GLB,,

## 2025-05-05 PROCEDURE — 3061F NEG MICROALBUMINURIA REV: CPT | Mod: CPTII,S$GLB,,

## 2025-05-05 PROCEDURE — 1125F AMNT PAIN NOTED PAIN PRSNT: CPT | Mod: CPTII,S$GLB,,

## 2025-05-05 PROCEDURE — 99999 PR PBB SHADOW E&M-EST. PATIENT-LVL III: CPT | Mod: PBBFAC,,,

## 2025-05-05 PROCEDURE — 3074F SYST BP LT 130 MM HG: CPT | Mod: CPTII,S$GLB,,

## 2025-05-05 PROCEDURE — 1159F MED LIST DOCD IN RCRD: CPT | Mod: CPTII,S$GLB,,

## 2025-05-05 NOTE — PROGRESS NOTES
Megha Robb presents to Plastic Surgery Clinic for a follow up visit status post bilateral batwing breast reduction w/ nipple removal on 4/1/2025 by Dr. Bowen Cortes. Of note, pt did have an ultrasound of her left breast which revealed a fluid collection. She was seen on Friday 4/25 and roughly 450 ccs of fluid was aspirated. Today, she states that she does note that her left breast has increased in size. She did  her antibiotics as well. She denies fever, chills, nausea, vomiting or other systemic signs of infection.     Interval History 4/30/2025:  Pt reports to the Plastic Surgery clinic stating her left breast has fluid collection. She does note some drainage as well.      Interval History 5/5/2025:  Pt reports her daughter in law Lakisha has been packing the area daily. She states that she sees significant improvement. Of note, pt's culture came back for MSSA for which it is sensitive to Bactrim. I have placed pt on Bactrim and she reports she has no redness of the left breast.     ROS - negative, other than stated above in HPI    PHYSICAL EXAMINATION  Vitals:    05/05/25 1019   BP: 123/69   Pulse: 63     WD WN NAD  VSS  Normal respiratory effort  R breast - incision with nylon sutures surrounding the T, surgically absent nipples, no erythema or drainage, no signs of infection.   L breast - incision with nylon sutures surrounding the T, 1 inch opening roughly an inch from the T that tunnels about 2 inches, surgically absent nipples, no erythema or drainage, no signs of infection.     ASSESSMENT/PLAN  70 y.o. F s/p bilateral breast reduction   - Pt was seen and evaluated by myself and Dr. Rafael Lama MD.   - Pt seems to be healing well. Her breast is no longer red and her daughter in law has actively been cleaning and packing the area. All nylon sutures were removed from the T. Pt instructed to continue packing and cleaning the area.   - RTC next week. Staff to schedule.    All questions were  answered. The patient was advised to call the clinic with any questions or concerns prior to their next visit.     Phyllis Flores   Plastic and Reconstructive Surgery   [phone]

## 2025-05-06 LAB — BACTERIA SPEC ANAEROBE CULT: NORMAL

## 2025-05-12 ENCOUNTER — OFFICE VISIT (OUTPATIENT)
Dept: PLASTIC SURGERY | Facility: CLINIC | Age: 70
End: 2025-05-12
Payer: MEDICARE

## 2025-05-12 VITALS
WEIGHT: 216.94 LBS | BODY MASS INDEX: 39.92 KG/M2 | DIASTOLIC BLOOD PRESSURE: 80 MMHG | SYSTOLIC BLOOD PRESSURE: 130 MMHG | HEART RATE: 79 BPM | HEIGHT: 62 IN

## 2025-05-12 DIAGNOSIS — Z09 SURGERY FOLLOW-UP EXAMINATION: Primary | ICD-10-CM

## 2025-05-12 PROCEDURE — 4010F ACE/ARB THERAPY RXD/TAKEN: CPT | Mod: CPTII,S$GLB,, | Performed by: SURGERY

## 2025-05-12 PROCEDURE — 99024 POSTOP FOLLOW-UP VISIT: CPT | Mod: S$GLB,,, | Performed by: SURGERY

## 2025-05-12 PROCEDURE — 3061F NEG MICROALBUMINURIA REV: CPT | Mod: CPTII,S$GLB,, | Performed by: SURGERY

## 2025-05-12 PROCEDURE — 1101F PT FALLS ASSESS-DOCD LE1/YR: CPT | Mod: CPTII,S$GLB,, | Performed by: SURGERY

## 2025-05-12 PROCEDURE — 3075F SYST BP GE 130 - 139MM HG: CPT | Mod: CPTII,S$GLB,, | Performed by: SURGERY

## 2025-05-12 PROCEDURE — 3079F DIAST BP 80-89 MM HG: CPT | Mod: CPTII,S$GLB,, | Performed by: SURGERY

## 2025-05-12 PROCEDURE — 1159F MED LIST DOCD IN RCRD: CPT | Mod: CPTII,S$GLB,, | Performed by: SURGERY

## 2025-05-12 PROCEDURE — 3288F FALL RISK ASSESSMENT DOCD: CPT | Mod: CPTII,S$GLB,, | Performed by: SURGERY

## 2025-05-12 PROCEDURE — 99999 PR PBB SHADOW E&M-EST. PATIENT-LVL III: CPT | Mod: PBBFAC,,, | Performed by: SURGERY

## 2025-05-12 PROCEDURE — 1125F AMNT PAIN NOTED PAIN PRSNT: CPT | Mod: CPTII,S$GLB,, | Performed by: SURGERY

## 2025-05-12 PROCEDURE — 3066F NEPHROPATHY DOC TX: CPT | Mod: CPTII,S$GLB,, | Performed by: SURGERY

## 2025-05-12 RX ORDER — SULFAMETHOXAZOLE AND TRIMETHOPRIM 800; 160 MG/1; MG/1
1 TABLET ORAL 2 TIMES DAILY
Qty: 20 TABLET | Refills: 0 | Status: SHIPPED | OUTPATIENT
Start: 2025-05-12 | End: 2025-05-22

## 2025-05-12 NOTE — PROGRESS NOTES
Patient presents to Plastic surgery Clinic after having massive breast reduction which was in effect a redo breast reduction from an outside surgeon.  The patient presents today after having been washed out in clinic for an open wound.  This wound is now almost completely healed.  She does have a little erythema around the area but this no longer tunnels it is only a cm deepened all directions.  I think this should heal without any problem.

## 2025-05-28 ENCOUNTER — OFFICE VISIT (OUTPATIENT)
Dept: PLASTIC SURGERY | Facility: CLINIC | Age: 70
End: 2025-05-28
Payer: MEDICARE

## 2025-05-28 DIAGNOSIS — Z09 SURGERY FOLLOW-UP EXAMINATION: Primary | ICD-10-CM

## 2025-05-28 DIAGNOSIS — Z98.890 S/P BILATERAL BREAST REDUCTION: ICD-10-CM

## 2025-05-28 PROCEDURE — 99024 POSTOP FOLLOW-UP VISIT: CPT | Mod: S$GLB,,, | Performed by: SURGERY

## 2025-05-28 PROCEDURE — 88177 CYTP FNA EVAL EA ADDL: CPT | Mod: TC,59 | Performed by: SURGERY

## 2025-05-28 PROCEDURE — 1101F PT FALLS ASSESS-DOCD LE1/YR: CPT | Mod: CPTII,S$GLB,, | Performed by: SURGERY

## 2025-05-28 PROCEDURE — 3066F NEPHROPATHY DOC TX: CPT | Mod: CPTII,S$GLB,, | Performed by: SURGERY

## 2025-05-28 PROCEDURE — 3061F NEG MICROALBUMINURIA REV: CPT | Mod: CPTII,S$GLB,, | Performed by: SURGERY

## 2025-05-28 PROCEDURE — 99999 PR PBB SHADOW E&M-EST. PATIENT-LVL III: CPT | Mod: PBBFAC,,, | Performed by: SURGERY

## 2025-05-28 PROCEDURE — 3288F FALL RISK ASSESSMENT DOCD: CPT | Mod: CPTII,S$GLB,, | Performed by: SURGERY

## 2025-05-28 PROCEDURE — 1159F MED LIST DOCD IN RCRD: CPT | Mod: CPTII,S$GLB,, | Performed by: SURGERY

## 2025-05-28 PROCEDURE — 1126F AMNT PAIN NOTED NONE PRSNT: CPT | Mod: CPTII,S$GLB,, | Performed by: SURGERY

## 2025-05-28 PROCEDURE — 4010F ACE/ARB THERAPY RXD/TAKEN: CPT | Mod: CPTII,S$GLB,, | Performed by: SURGERY

## 2025-05-30 LAB
ESTROGEN SERPL-MCNC: NORMAL PG/ML
INSULIN SERPL-ACNC: NORMAL U[IU]/ML
LAB AP GROSS DESCRIPTION: NORMAL
LAB AP NON-GYN INTERPRETATION SPECIMEN 1: NORMAL
LAB AP PERFORMING LOCATION(S): NORMAL

## 2025-06-26 ENCOUNTER — PATIENT MESSAGE (OUTPATIENT)
Dept: PLASTIC SURGERY | Facility: CLINIC | Age: 70
End: 2025-06-26
Payer: MEDICARE

## 2025-07-09 ENCOUNTER — OFFICE VISIT (OUTPATIENT)
Dept: PLASTIC SURGERY | Facility: CLINIC | Age: 70
End: 2025-07-09
Payer: MEDICARE

## 2025-07-09 VITALS
SYSTOLIC BLOOD PRESSURE: 130 MMHG | HEIGHT: 62 IN | BODY MASS INDEX: 39.92 KG/M2 | WEIGHT: 216.94 LBS | HEART RATE: 79 BPM | DIASTOLIC BLOOD PRESSURE: 80 MMHG

## 2025-07-09 DIAGNOSIS — Z09 SURGERY FOLLOW-UP EXAMINATION: Primary | ICD-10-CM

## 2025-07-09 DIAGNOSIS — Z98.890 S/P BILATERAL BREAST REDUCTION: ICD-10-CM

## 2025-07-09 PROCEDURE — 3066F NEPHROPATHY DOC TX: CPT | Mod: CPTII,S$GLB,,

## 2025-07-09 PROCEDURE — 99999 PR PBB SHADOW E&M-EST. PATIENT-LVL III: CPT | Mod: PBBFAC,,,

## 2025-07-09 PROCEDURE — 99024 POSTOP FOLLOW-UP VISIT: CPT | Mod: S$GLB,,,

## 2025-07-09 PROCEDURE — 3075F SYST BP GE 130 - 139MM HG: CPT | Mod: CPTII,S$GLB,,

## 2025-07-09 PROCEDURE — 3061F NEG MICROALBUMINURIA REV: CPT | Mod: CPTII,S$GLB,,

## 2025-07-09 PROCEDURE — 1159F MED LIST DOCD IN RCRD: CPT | Mod: CPTII,S$GLB,,

## 2025-07-09 PROCEDURE — 1126F AMNT PAIN NOTED NONE PRSNT: CPT | Mod: CPTII,S$GLB,,

## 2025-07-09 PROCEDURE — 3079F DIAST BP 80-89 MM HG: CPT | Mod: CPTII,S$GLB,,

## 2025-07-09 PROCEDURE — 3288F FALL RISK ASSESSMENT DOCD: CPT | Mod: CPTII,S$GLB,,

## 2025-07-09 PROCEDURE — 1101F PT FALLS ASSESS-DOCD LE1/YR: CPT | Mod: CPTII,S$GLB,,

## 2025-07-09 PROCEDURE — 4010F ACE/ARB THERAPY RXD/TAKEN: CPT | Mod: CPTII,S$GLB,,

## 2025-07-09 NOTE — PROGRESS NOTES
Megha Robb presents to Plastic Surgery Clinic for a follow up visit status post batwing bilateral breast reduction on 4/1/2025 by Dr. Bowen Cortes. She is doing well today with no issues. She reports she is extremely pleased with her results with no complaints. She denies fever, chills, nausea, vomiting or other systemic signs of infection.     ROS - negative, other than stated above in HPI    PHYSICAL EXAMINATION  Vitals:    07/09/25 0837   BP: 130/80   Pulse: 79     WD WN NAD  VSS  Normal respiratory effort  R breast - incision CDI, no erythema or drainage, surgically absent nipples  L breast - incision CDI, no erythema or drainage, surgically absent nipples    ASSESSMENT/PLAN  70 y.o. F s/p bilateral breast reduction   - Doing well, no issues.   - Pt instructed to use silicone tape as needed along her scars.   - Ref for nipple tattoo sent.   - RTC as needed.    All questions were answered. The patient was advised to call the clinic with any questions or concerns prior to their next visit.     Phyllis Flores   Plastic and Reconstructive Surgery   [phone]      [NL] : warm

## 2025-08-04 ENCOUNTER — OFFICE VISIT (OUTPATIENT)
Dept: SURGERY | Facility: CLINIC | Age: 70
End: 2025-08-04
Payer: MEDICARE

## 2025-08-04 VITALS
DIASTOLIC BLOOD PRESSURE: 74 MMHG | RESPIRATION RATE: 19 BRPM | HEIGHT: 62 IN | OXYGEN SATURATION: 97 % | BODY MASS INDEX: 39.92 KG/M2 | HEART RATE: 76 BPM | WEIGHT: 216.94 LBS | SYSTOLIC BLOOD PRESSURE: 108 MMHG | TEMPERATURE: 98 F

## 2025-08-04 DIAGNOSIS — Z98.890 S/P BILATERAL BREAST REDUCTION: Primary | ICD-10-CM

## 2025-08-04 PROCEDURE — 99999 PR PBB SHADOW E&M-EST. PATIENT-LVL IV: CPT | Mod: PBBFAC,,, | Performed by: PHYSICIAN ASSISTANT

## 2025-08-04 RX ORDER — ATORVASTATIN CALCIUM 20 MG/1
20 TABLET, FILM COATED ORAL
COMMUNITY

## 2025-08-04 NOTE — PROGRESS NOTES
[facility]  Department of Surgery      Subjective:     Megha Robb presents the [facility] on 8/4/2025 for consultation regarding 3 dimensional nipple tattoo for breast reconstruction. She is status post bilateral mammoplasty breast reduction requiring removal of natural NAC by Dr. Cortes. Her last surgery was 4/1/25. She has a nice result and is pleased with the results of her breast reconstruction. She denies fever, chills, nausea, vomiting, chest pain or shortness of breath.     Review of patient's allergies indicates:   Allergen Reactions    Bee sting [allergen ext-venom-honey bee] Shortness Of Breath    Pcn [penicillins] Hives    Vancomycin analogues Rash     Medications Ordered Prior to Encounter[1]  Problem List[2]  Past Surgical History:   Procedure Laterality Date    APPENDECTOMY      breast reduction      CHOLECYSTECTOMY      COLONOSCOPY N/A 07/28/2023    Procedure: COLONOSCOPY;  Surgeon: Bryson Srinivasan MD;  [facility];  Service: Endoscopy;  Laterality: N/A;    ESOPHAGOGASTRODUODENOSCOPY N/A 08/18/2023    Procedure: EGD (ESOPHAGOGASTRODUODENOSCOPY);  Surgeon: Bryson Srinivasan MD;  Location[facility];  Service: Endoscopy;  Laterality: N/A;    EYE SURGERY Bilateral     PHACO    GALLBLADDER SURGERY      GASTRIC RESTRICTION SURGERY      HYSTERECTOMY      JOINT REPLACEMENT      left knee replacement      april 2017    LIPOSUCTION      to legs x3    REDUCTION OF BOTH BREASTS Bilateral 4/1/2025    Procedure: MAMMOPLASTY, REDUCTION, BILATERAL;  Surgeon: Bowen Cortes MD;  Location: [address];  Service: Plastics;  Laterality: Bilateral;  + NIPPLE REMOVAL    TONSILLECTOMY      TOTAL REDUCTION MAMMOPLASTY      tummy tuck      VARICOSE VEIN SURGERY      WRIST ARTHROPLASTY Bilateral      Social History[3]    ROS: status post breast reconstruction      Objective:     Vitals:    08/04/25 1342   BP: 108/74   Pulse: 76   Resp: 19   Temp: 98 °F (36.7 °C)        PHYSICAL EXAMINATION:   Physical Exam   Vitals reviewed.  Constitutional: She is oriented to person, place, and time.   HENT:   Head: Normocephalic and atraumatic.   Nose: Nose normal.   Eyes: Pupils are equal, round, and reactive to light. Right eye exhibits no discharge. Left eye exhibits no discharge.   Pulmonary/Chest: Effort normal and breath sounds normal. No stridor. No respiratory distress. She exhibits no mass, no tenderness and no edema. Right breast exhibits no mass, no skin change and no tenderness. Left breast exhibits no mass, no skin change and no tenderness. No breast swelling or bleeding. Breasts are symmetrical.       Abdominal: Normal appearance.   Genitourinary: No breast swelling or bleeding.   Neurological: She is alert and oriented to person, place, and time.   Skin: Skin is warm and dry.     Psychiatric: Her behavior is normal. Mood, judgment and thought content normal.        Assessment:     70 y.o. female status post bilateral mammoplasty reduction here to discuss options for 3D nipple tattoo.     Plan:     - Patient is pleased with the outcome of her breast reconstruction and is now interested in 3D nipple tattooing.   - Patient is a good candidate for medical tattooing. She is well healed from her last surgical procedure.   - Procedure discussed in detail with the patient as were the risks and possible complications. She understands that the risks include but are not limited to bleeding, scarring, infection, pain, numbness, asymmetry, deformity, allergic reaction, failure to take, infection, skin necrosis, wound dehiscence and need for second stage tattoo.   - Post op instructions were reviewed with verbal understanding. Print out provided.   - Patient would like to proceed, office staff to coordinate tattoo procedure date at patients convenience. All questions were answered. The patient was advised to call the clinic with any questions or concerns prior to their next visit.        Total time spent with the patient: 20.  15 minutes of face to face consultation and 5 minutes of chart review and coordination of care.               [1]   Current Outpatient Medications on File Prior to Visit   Medication Sig Dispense Refill    amitriptyline (ELAVIL) 25 MG tablet Take 50 mg by mouth every evening.      atorvastatin (LIPITOR) 20 MG tablet Take 20 mg by mouth.      busPIRone (BUSPAR) 10 MG tablet Take 10 mg by mouth 2 (two) times daily.      CARAFATE 1 gram tablet Take 1 g by mouth 4 (four) times daily before meals and nightly.      diclofenac sodium (VOLTAREN ARTHRITIS PAIN) 1 % Gel Apply 2 g topically 2 (two) times daily as needed (arthritis pain). 200 g 2    ferrous sulfate 325 (65 FE) MG EC tablet Take 325 mg by mouth once daily.       FLUVIRIN [id number] 45 mcg (15 mcg x 3)/0.5 mL Susp   0    HYDROcodone-acetaminophen (NORCO)  mg per tablet Take 1 tablet by mouth 4 (four) times daily as needed for Pain.      hydrOXYzine pamoate (VISTARIL) 50 MG Cap Take 50 mg by mouth once daily.  1    LYRICA 200 mg Cap Take 200 mg by mouth 3 (three) times daily.      multivitamin (THERAGRAN) per tablet Take 1 tablet by mouth once daily.        omega-3 fatty acids/fish oil (FISH OIL-OMEGA-3 FATTY ACIDS) 300-1,000 mg capsule Take by mouth once daily.      quinapriL (ACCUPRIL) 40 MG tablet Take 40 mg by mouth every evening.      albuterol (VENTOLIN HFA) 90 mcg/actuation inhaler Inhale 2 puffs into the lungs every 6 (six) hours as needed for Wheezing. Rescue 8.5 g 2    pantoprazole (PROTONIX) 40 MG tablet Take 1 tablet (40 mg total) by mouth once daily. 90 tablet 1     No current facility-administered medications on file prior to visit.   [2]   Patient Active Problem List  Diagnosis    Leg pain, bilateral    Varicose veins of lower extremities with other complications    HTN (hypertension), dx 2012    Class 3 severe obesity with serious comorbidity and body mass index (BMI) of 40.0 to 44.9 in adult     Hematuria - cause not known    Closed fracture of left wrist    Carpal tunnel syndrome of left wrist    Osteoarthritis of midfoot    Physical deconditioning    Status post surgery (recent left forearm surgery)    Bilateral lower extremity edema    Major depressive disorder, single episode, mild    Venous insufficiency    Hypertrophy of breast    Macrocytosis    Snoring    Uncomplicated opioid dependence, on hydrocodone qid since 2015    Excessive caffeine intake    Hypoalbuminemia    Cardiovascular event risk, ASCVD 10-year risk 13.3%, 2023    Abnormal ECG   [3]   Social History  Socioeconomic History    Marital status: Single   Tobacco Use    Smoking status: Never    Smokeless tobacco: Never   Substance and Sexual Activity    Alcohol use: Yes     Alcohol/week: 2.0 standard drinks of alcohol     Types: 1 Cans of beer, 1 Shots of liquor per week     Comment: rarely    Drug use: No     Social Drivers of Health     Financial Resource Strain: Low Risk  (3/2/2025)    Overall Financial Resource Strain (CARDIA)     Difficulty of Paying Living Expenses: Not hard at all   Food Insecurity: No Food Insecurity (3/2/2025)    Hunger Vital Sign     Worried About Running Out of Food in the Last Year: Never true     Ran Out of Food in the Last Year: Never true   Transportation Needs: No Transportation Needs (3/2/2025)    PRAPARE - Transportation     Lack of Transportation (Medical): No     Lack of Transportation (Non-Medical): No   Physical Activity: Sufficiently Active (3/2/2025)    Exercise Vital Sign     Days of Exercise per Week: 3 days     Minutes of Exercise per Session: 60 min   Stress: No Stress Concern Present (3/2/2025)    [employer] [employer] - Occupational Stress Questionnaire     Feeling of Stress : Not at all   Housing Stability: Low Risk  (3/2/2025)    Housing Stability Vital Sign     Unable to Pay for Housing in the Last Year: No     Number of Times Moved in the Last Year: 0     Homeless in the  Last Year: No

## (undated) DEVICE — EVACUATOR WOUND BULB 100CC

## (undated) DEVICE — NDL HYPO A BEVEL 22X1 1/2

## (undated) DEVICE — REMOVER STAPLE SKIN STERILE

## (undated) DEVICE — DRAIN CHANNEL ROUND 15FR

## (undated) DEVICE — GAUZE SPONGE 4X4 12PLY

## (undated) DEVICE — SUT MONOCRYL 3-0 PS-2 UND

## (undated) DEVICE — TOURNIQUET SB QC DP 18X4IN

## (undated) DEVICE — BLADE SURG #15 CARBON STEEL

## (undated) DEVICE — SUT BONE WAX 2.5 GRMS 12/BX

## (undated) DEVICE — GOWN SURGICAL X-LARGE

## (undated) DEVICE — BANDAGE ROLL COTTN 4.5INX4.1YD

## (undated) DEVICE — SUT MONOCYRL 4-0 PS2 UND

## (undated) DEVICE — MARKER FN REG DUAL UTIL RULER

## (undated) DEVICE — TIP YANKAUERS BULB NO VENT

## (undated) DEVICE — SUT SILK 2-0 PS 18IN BLACK

## (undated) DEVICE — Device

## (undated) DEVICE — MANIFOLD 4 PORT

## (undated) DEVICE — DRESSING XEROFORM FOIL PK 1X8

## (undated) DEVICE — ALCOHOL 70% ISOP RUBBING 4OZ

## (undated) DEVICE — TRAY MINOR GEN SURG OMC

## (undated) DEVICE — BANDAGE BULKEE II 2.25INX3YD

## (undated) DEVICE — DRAPE STERI-DRAPE 1000 17X11IN

## (undated) DEVICE — SYR 10CC LUER LOCK

## (undated) DEVICE — SEE MEDLINE ITEM 157117

## (undated) DEVICE — STAPLER SKIN ROTATING HEAD

## (undated) DEVICE — BLADE SURG CARBON STEEL #10

## (undated) DEVICE — SEE MEDLINE ITEM 146313

## (undated) DEVICE — SPONGE LAP 18X18 PREWASHED

## (undated) DEVICE — ELECTRODE REM PLYHSV RETURN 9

## (undated) DEVICE — GLOVE SURGICAL LATEX SZ 6.5

## (undated) DEVICE — SUT MCRYL PLUS 4-0 PS2 27IN

## (undated) DEVICE — DRAPE HALF SURGICAL 40X58IN

## (undated) DEVICE — ELECTRODE MEGADYNE RETURN DUAL

## (undated) DEVICE — SUT ETHILON 3-0 PS2 18 BLK

## (undated) DEVICE — GAUZE SPONGE 8X4 12 PLY

## (undated) DEVICE — SEE MEDLINE ITEM 152622

## (undated) DEVICE — DURAPREP SURG SCRUB 26ML

## (undated) DEVICE — SYR 30CC LUER LOCK

## (undated) DEVICE — SEE MEDLINE ITEM 157131

## (undated) DEVICE — BANDAGE ESMARK LATEX FREE 4INX

## (undated) DEVICE — TRAY CATH 1-LYR URIMTR 16FR

## (undated) DEVICE — APPLIER CLIP LIAGCLIP 9.375IN

## (undated) DEVICE — DRAPE STERI INSTRUMENT 1018

## (undated) DEVICE — LINER SUCTION 3000CC

## (undated) DEVICE — NDL SPINAL SPINOCAN 22GX3.5

## (undated) DEVICE — SEE MEDLINE ITEM 146270

## (undated) DEVICE — SEE MEDLINE ITEM 146298

## (undated) DEVICE — GOWN AERO CHROME W/ TOWEL XL

## (undated) DEVICE — PAD ABDOMINAL STERILE 8X10IN

## (undated) DEVICE — BOVIE SUCTION